# Patient Record
Sex: FEMALE | Race: WHITE | NOT HISPANIC OR LATINO | Employment: OTHER | URBAN - METROPOLITAN AREA
[De-identification: names, ages, dates, MRNs, and addresses within clinical notes are randomized per-mention and may not be internally consistent; named-entity substitution may affect disease eponyms.]

---

## 2017-02-07 ENCOUNTER — CONVERSION ENCOUNTER (OUTPATIENT)
Dept: MAMMOGRAPHY | Facility: HOSPITAL | Age: 47
End: 2017-02-07

## 2018-02-26 ENCOUNTER — OFFICE VISIT CONVERTED (OUTPATIENT)
Dept: FAMILY MEDICINE CLINIC | Facility: CLINIC | Age: 48
End: 2018-02-26
Attending: NURSE PRACTITIONER

## 2018-03-28 ENCOUNTER — CONVERSION ENCOUNTER (OUTPATIENT)
Dept: FAMILY MEDICINE CLINIC | Facility: CLINIC | Age: 48
End: 2018-03-28

## 2018-03-28 ENCOUNTER — OFFICE VISIT CONVERTED (OUTPATIENT)
Dept: FAMILY MEDICINE CLINIC | Facility: CLINIC | Age: 48
End: 2018-03-28
Attending: NURSE PRACTITIONER

## 2018-05-01 ENCOUNTER — OFFICE VISIT CONVERTED (OUTPATIENT)
Dept: ORTHOPEDIC SURGERY | Facility: CLINIC | Age: 48
End: 2018-05-01
Attending: ORTHOPAEDIC SURGERY

## 2018-05-01 ENCOUNTER — CONVERSION ENCOUNTER (OUTPATIENT)
Dept: ORTHOPEDIC SURGERY | Facility: CLINIC | Age: 48
End: 2018-05-01

## 2018-05-03 ENCOUNTER — OFFICE VISIT CONVERTED (OUTPATIENT)
Dept: FAMILY MEDICINE CLINIC | Facility: CLINIC | Age: 48
End: 2018-05-03
Attending: NURSE PRACTITIONER

## 2018-05-03 ENCOUNTER — CONVERSION ENCOUNTER (OUTPATIENT)
Dept: FAMILY MEDICINE CLINIC | Facility: CLINIC | Age: 48
End: 2018-05-03

## 2018-05-11 ENCOUNTER — OFFICE VISIT CONVERTED (OUTPATIENT)
Dept: ORTHOPEDIC SURGERY | Facility: CLINIC | Age: 48
End: 2018-05-11
Attending: ORTHOPAEDIC SURGERY

## 2018-05-24 ENCOUNTER — CONVERSION ENCOUNTER (OUTPATIENT)
Dept: MAMMOGRAPHY | Facility: HOSPITAL | Age: 48
End: 2018-05-24

## 2018-06-21 ENCOUNTER — CONVERSION ENCOUNTER (OUTPATIENT)
Dept: FAMILY MEDICINE CLINIC | Facility: CLINIC | Age: 48
End: 2018-06-21

## 2018-06-21 ENCOUNTER — OFFICE VISIT CONVERTED (OUTPATIENT)
Dept: FAMILY MEDICINE CLINIC | Facility: CLINIC | Age: 48
End: 2018-06-21
Attending: NURSE PRACTITIONER

## 2018-07-26 ENCOUNTER — OFFICE VISIT CONVERTED (OUTPATIENT)
Dept: FAMILY MEDICINE CLINIC | Facility: CLINIC | Age: 48
End: 2018-07-26
Attending: NURSE PRACTITIONER

## 2018-08-08 ENCOUNTER — OFFICE VISIT CONVERTED (OUTPATIENT)
Dept: FAMILY MEDICINE CLINIC | Facility: CLINIC | Age: 48
End: 2018-08-08
Attending: NURSE PRACTITIONER

## 2018-08-15 ENCOUNTER — CONVERSION ENCOUNTER (OUTPATIENT)
Dept: FAMILY MEDICINE CLINIC | Facility: CLINIC | Age: 48
End: 2018-08-15

## 2018-08-15 ENCOUNTER — OFFICE VISIT CONVERTED (OUTPATIENT)
Dept: FAMILY MEDICINE CLINIC | Facility: CLINIC | Age: 48
End: 2018-08-15
Attending: NURSE PRACTITIONER

## 2018-09-17 ENCOUNTER — OFFICE VISIT CONVERTED (OUTPATIENT)
Dept: FAMILY MEDICINE CLINIC | Facility: CLINIC | Age: 48
End: 2018-09-17
Attending: NURSE PRACTITIONER

## 2018-10-09 ENCOUNTER — OFFICE VISIT CONVERTED (OUTPATIENT)
Dept: ORTHOPEDIC SURGERY | Facility: CLINIC | Age: 48
End: 2018-10-09
Attending: ORTHOPAEDIC SURGERY

## 2018-10-09 ENCOUNTER — CONVERSION ENCOUNTER (OUTPATIENT)
Dept: ORTHOPEDIC SURGERY | Facility: CLINIC | Age: 48
End: 2018-10-09

## 2019-01-14 ENCOUNTER — HOSPITAL ENCOUNTER (OUTPATIENT)
Dept: URGENT CARE | Facility: CLINIC | Age: 49
Discharge: HOME | End: 2019-01-14
Attending: NURSE PRACTITIONER

## 2019-01-17 LAB — BACTERIA SPEC AEROBE CULT: NORMAL

## 2019-02-07 ENCOUNTER — HOSPITAL ENCOUNTER (OUTPATIENT)
Dept: OTHER | Facility: HOSPITAL | Age: 49
Discharge: HOME | End: 2019-02-07
Attending: INTERNAL MEDICINE

## 2019-02-07 LAB
ALT SERPL-CCNC: 10 U/L (ref 10–40)
AST SERPL-CCNC: 13 U/L (ref 15–50)
BASOPHILS # BLD AUTO: 0.06 10*3/UL (ref 0–0.2)
BASOPHILS NFR BLD AUTO: 0.8 % (ref 0–3)
BUN SERPL-MCNC: 12 MG/DL (ref 5–25)
CREAT UR-MCNC: 0.88 MG/DL (ref 0.5–0.9)
EOSINOPHIL # BLD AUTO: 0.11 10*3/UL (ref 0–0.7)
EOSINOPHIL # BLD AUTO: 1.47 % (ref 0–7)
ERYTHROCYTE [DISTWIDTH] IN BLOOD BY AUTOMATED COUNT: 11.5 % (ref 11.5–14.5)
ERYTHROCYTE [SEDIMENTATION RATE] IN BLOOD: 25 MM/H (ref 0–30)
HBA1C MFR BLD: 14.6 G/DL (ref 12–16)
HCT VFR BLD AUTO: 41 % (ref 37–47)
LYMPHOCYTES # BLD AUTO: 2.73 10*3/UL (ref 1–5)
MCH RBC QN AUTO: 31.8 PG (ref 27–31)
MCHC RBC AUTO-ENTMCNC: 35.6 G/DL (ref 33–37)
MCV RBC AUTO: 89.3 FL (ref 81–99)
MONOCYTES # BLD AUTO: 0.54 10*3/UL (ref 0.2–1.2)
MONOCYTES NFR BLD AUTO: 7.5 % (ref 3–10)
NEUTROPHILS # BLD AUTO: 3.71 10*3/UL (ref 2–8)
NEUTROPHILS NFR BLD AUTO: 51.9 % (ref 30–85)
NRBC BLD AUTO-RTO: 0 % (ref 0–0.01)
PLATELET # BLD AUTO: 252 10*3/UL (ref 130–400)
PMV BLD AUTO: 7.7 FL (ref 7.4–10.4)
RBC # BLD AUTO: 4.59 10*6/UL (ref 4.2–5.4)
VARIANT LYMPHS NFR BLD MANUAL: 38.3 % (ref 20–45)
WBC # BLD AUTO: 7.14 10*3/UL (ref 4.8–10.8)

## 2019-02-22 ENCOUNTER — HOSPITAL ENCOUNTER (OUTPATIENT)
Dept: URGENT CARE | Facility: CLINIC | Age: 49
Discharge: HOME | End: 2019-02-22
Attending: EMERGENCY MEDICINE

## 2019-05-16 ENCOUNTER — HOSPITAL ENCOUNTER (OUTPATIENT)
Dept: FAMILY MEDICINE CLINIC | Facility: CLINIC | Age: 49
Discharge: HOME | End: 2019-05-16
Attending: NURSE PRACTITIONER

## 2019-05-16 ENCOUNTER — OFFICE VISIT CONVERTED (OUTPATIENT)
Dept: FAMILY MEDICINE CLINIC | Facility: CLINIC | Age: 49
End: 2019-05-16
Attending: NURSE PRACTITIONER

## 2019-05-16 LAB
ALBUMIN SERPL-MCNC: 4.1 G/DL (ref 3.5–5)
ALBUMIN/GLOB SERPL: 1.2 {RATIO} (ref 1.4–2.6)
ALP SERPL-CCNC: 122 U/L (ref 42–98)
ALT SERPL-CCNC: 13 U/L (ref 10–40)
ANION GAP SERPL CALC-SCNC: 17 MMOL/L (ref 8–19)
AST SERPL-CCNC: 15 U/L (ref 15–50)
BASOPHILS # BLD AUTO: 0.03 10*3/UL (ref 0–0.2)
BASOPHILS NFR BLD AUTO: 0.4 % (ref 0–3)
BILIRUB SERPL-MCNC: 0.24 MG/DL (ref 0.2–1.3)
BUN SERPL-MCNC: 14 MG/DL (ref 5–25)
BUN/CREAT SERPL: 17 {RATIO} (ref 6–20)
CALCIUM SERPL-MCNC: 9.5 MG/DL (ref 8.7–10.4)
CHLORIDE SERPL-SCNC: 103 MMOL/L (ref 99–111)
CHOLEST SERPL-MCNC: 170 MG/DL (ref 107–200)
CHOLEST/HDLC SERPL: 5.2 {RATIO} (ref 3–6)
CONV ABS IMM GRAN: 0.02 10*3/UL (ref 0–0.2)
CONV CO2: 25 MMOL/L (ref 22–32)
CONV IMMATURE GRAN: 0.2 % (ref 0–1.8)
CONV TOTAL PROTEIN: 7.6 G/DL (ref 6.3–8.2)
CREAT UR-MCNC: 0.82 MG/DL (ref 0.5–0.9)
DEPRECATED RDW RBC AUTO: 43.5 FL (ref 36.4–46.3)
EOSINOPHIL # BLD AUTO: 0.16 10*3/UL (ref 0–0.7)
EOSINOPHIL # BLD AUTO: 1.9 % (ref 0–7)
ERYTHROCYTE [DISTWIDTH] IN BLOOD BY AUTOMATED COUNT: 12.8 % (ref 11.7–14.4)
GFR SERPLBLD BASED ON 1.73 SQ M-ARVRAT: >60 ML/MIN/{1.73_M2}
GLOBULIN UR ELPH-MCNC: 3.5 G/DL (ref 2–3.5)
GLUCOSE SERPL-MCNC: 84 MG/DL (ref 65–99)
HBA1C MFR BLD: 14.1 G/DL (ref 12–16)
HCT VFR BLD AUTO: 42.7 % (ref 37–47)
HDLC SERPL-MCNC: 33 MG/DL (ref 40–60)
LDLC SERPL CALC-MCNC: 80 MG/DL (ref 70–100)
LYMPHOCYTES # BLD AUTO: 3.17 10*3/UL (ref 1–5)
MCH RBC QN AUTO: 30.8 PG (ref 27–31)
MCHC RBC AUTO-ENTMCNC: 33 G/DL (ref 33–37)
MCV RBC AUTO: 93.2 FL (ref 81–99)
MONOCYTES # BLD AUTO: 0.6 10*3/UL (ref 0.2–1.2)
MONOCYTES NFR BLD AUTO: 7.1 % (ref 3–10)
NEUTROPHILS # BLD AUTO: 4.48 10*3/UL (ref 2–8)
NEUTROPHILS NFR BLD AUTO: 52.9 % (ref 30–85)
NRBC CBCN: 0 % (ref 0–0.7)
OSMOLALITY SERPL CALC.SUM OF ELEC: 292 MOSM/KG (ref 273–304)
PLATELET # BLD AUTO: 272 10*3/UL (ref 130–400)
PMV BLD AUTO: 11.6 FL (ref 9.4–12.3)
POTASSIUM SERPL-SCNC: 4 MMOL/L (ref 3.5–5.3)
RBC # BLD AUTO: 4.58 10*6/UL (ref 4.2–5.4)
SODIUM SERPL-SCNC: 141 MMOL/L (ref 135–147)
TRIGL SERPL-MCNC: 283 MG/DL (ref 40–150)
VARIANT LYMPHS NFR BLD MANUAL: 37.5 % (ref 20–45)
VLDLC SERPL-MCNC: 57 MG/DL (ref 5–37)
WBC # BLD AUTO: 8.46 10*3/UL (ref 4.8–10.8)

## 2019-05-21 ENCOUNTER — OFFICE VISIT CONVERTED (OUTPATIENT)
Dept: SURGERY | Facility: CLINIC | Age: 49
End: 2019-05-21
Attending: SURGERY

## 2019-06-10 ENCOUNTER — HOSPITAL ENCOUNTER (OUTPATIENT)
Dept: URGENT CARE | Facility: CLINIC | Age: 49
Discharge: HOME | End: 2019-06-10

## 2019-06-12 LAB — BACTERIA SPEC AEROBE CULT: NORMAL

## 2019-07-23 ENCOUNTER — HOSPITAL ENCOUNTER (OUTPATIENT)
Dept: MAMMOGRAPHY | Facility: HOSPITAL | Age: 49
Discharge: HOME | End: 2019-07-23
Attending: NURSE PRACTITIONER

## 2019-08-07 ENCOUNTER — HOSPITAL ENCOUNTER (OUTPATIENT)
Dept: GASTROENTEROLOGY | Facility: HOSPITAL | Age: 49
Setting detail: HOSPITAL OUTPATIENT SURGERY
Discharge: HOME | End: 2019-08-07
Attending: SURGERY

## 2019-08-14 ENCOUNTER — OFFICE VISIT CONVERTED (OUTPATIENT)
Dept: FAMILY MEDICINE CLINIC | Facility: CLINIC | Age: 49
End: 2019-08-14
Attending: NURSE PRACTITIONER

## 2019-09-23 ENCOUNTER — OFFICE VISIT CONVERTED (OUTPATIENT)
Dept: FAMILY MEDICINE CLINIC | Facility: CLINIC | Age: 49
End: 2019-09-23
Attending: NURSE PRACTITIONER

## 2019-09-23 ENCOUNTER — CONVERSION ENCOUNTER (OUTPATIENT)
Dept: FAMILY MEDICINE CLINIC | Facility: CLINIC | Age: 49
End: 2019-09-23

## 2019-10-09 ENCOUNTER — HOSPITAL ENCOUNTER (OUTPATIENT)
Dept: OTHER | Facility: HOSPITAL | Age: 49
Discharge: HOME | End: 2019-10-09
Attending: NURSE PRACTITIONER

## 2019-10-09 LAB
ALT SERPL-CCNC: 13 U/L (ref 10–40)
AST SERPL-CCNC: 17 U/L (ref 15–50)
BASOPHILS # BLD AUTO: 0.03 10*3/UL (ref 0–0.2)
BASOPHILS NFR BLD AUTO: 0.5 % (ref 0–3)
BUN SERPL-MCNC: 10 MG/DL (ref 5–25)
CONV ABS IMM GRAN: 0.01 10*3/UL (ref 0–0.2)
CONV IMMATURE GRAN: 0.2 % (ref 0–1.8)
CREAT UR-MCNC: 0.78 MG/DL (ref 0.5–0.9)
DEPRECATED RDW RBC AUTO: 40.9 FL (ref 36.4–46.3)
EOSINOPHIL # BLD AUTO: 0.12 10*3/UL (ref 0–0.7)
EOSINOPHIL # BLD AUTO: 1.9 % (ref 0–7)
ERYTHROCYTE [DISTWIDTH] IN BLOOD BY AUTOMATED COUNT: 12.4 % (ref 11.7–14.4)
ERYTHROCYTE [SEDIMENTATION RATE] IN BLOOD: 15 MM/H (ref 0–30)
HCT VFR BLD AUTO: 42.3 % (ref 37–47)
HGB BLD-MCNC: 14 G/DL (ref 12–16)
LYMPHOCYTES # BLD AUTO: 3.08 10*3/UL (ref 1–5)
LYMPHOCYTES NFR BLD AUTO: 50 % (ref 20–45)
MCH RBC QN AUTO: 30.3 PG (ref 27–31)
MCHC RBC AUTO-ENTMCNC: 33.1 G/DL (ref 33–37)
MCV RBC AUTO: 91.6 FL (ref 81–99)
MONOCYTES # BLD AUTO: 0.23 10*3/UL (ref 0.2–1.2)
MONOCYTES NFR BLD AUTO: 3.7 % (ref 3–10)
NEUTROPHILS # BLD AUTO: 2.69 10*3/UL (ref 2–8)
NEUTROPHILS NFR BLD AUTO: 43.7 % (ref 30–85)
NRBC CBCN: 0 % (ref 0–0.7)
PLATELET # BLD AUTO: 270 10*3/UL (ref 130–400)
PMV BLD AUTO: 10.5 FL (ref 9.4–12.3)
RBC # BLD AUTO: 4.62 10*6/UL (ref 4.2–5.4)
WBC # BLD AUTO: 6.16 10*3/UL (ref 4.8–10.8)

## 2019-12-30 ENCOUNTER — HOSPITAL ENCOUNTER (OUTPATIENT)
Dept: URGENT CARE | Facility: CLINIC | Age: 49
Discharge: HOME | End: 2019-12-30
Attending: EMERGENCY MEDICINE

## 2020-01-02 LAB — BACTERIA SPEC AEROBE CULT: NORMAL

## 2020-01-21 ENCOUNTER — HOSPITAL ENCOUNTER (OUTPATIENT)
Dept: URGENT CARE | Facility: CLINIC | Age: 50
Discharge: HOME | End: 2020-01-21

## 2020-02-05 ENCOUNTER — OFFICE VISIT CONVERTED (OUTPATIENT)
Dept: FAMILY MEDICINE CLINIC | Facility: CLINIC | Age: 50
End: 2020-02-05
Attending: NURSE PRACTITIONER

## 2020-02-05 ENCOUNTER — CONVERSION ENCOUNTER (OUTPATIENT)
Dept: FAMILY MEDICINE CLINIC | Facility: CLINIC | Age: 50
End: 2020-02-05

## 2020-02-20 ENCOUNTER — HOSPITAL ENCOUNTER (OUTPATIENT)
Dept: FAMILY MEDICINE CLINIC | Facility: CLINIC | Age: 50
Discharge: HOME | End: 2020-02-20
Attending: NURSE PRACTITIONER

## 2020-02-20 ENCOUNTER — OFFICE VISIT CONVERTED (OUTPATIENT)
Dept: FAMILY MEDICINE CLINIC | Facility: CLINIC | Age: 50
End: 2020-02-20
Attending: NURSE PRACTITIONER

## 2020-02-21 LAB
A FUMIGATUS AB SER QL ID: <0.1 K[IU]/ML
AMER SYCAMORE IGE QN: <0.1 K[IU]/ML
BERMUDA GRASS IGE QN: <0.1 K[IU]/ML (ref 0–0.35)
BOXELDER IGE QN: <0.1 K[IU]/ML
CALIF WALNUT POLN IGE QN: <0.1 K[IU]/ML (ref 0–0.35)
CAT DANDER IGG QN: <0.1 K[IU]/ML (ref 0–0.35)
CLADOSPORIUM IGE: <0.1 K[IU]/ML
CMN PIGWEED IGE QN: <0.1 K[IU]/ML
COMMON RAGWEED IGE QN: <0.1 K[IU]/ML (ref 0–0.35)
COTTONWOOD IGE QN: <0.1 K[IU]/ML
D FARINAE IGE QN: <0.1 K[IU]/ML (ref 0–0.35)
D PTERONYSS IGE QN: <0.1 K[IU]/ML (ref 0–0.35)
DOG DANDER IGE QN: <0.1 K[IU]/ML (ref 0–0.35)
GOOSEFOOT IGE QN: <0.1 K[IU]/ML (ref 0–0.35)
IGE SERPL-ACNC: 225 K[IU]/ML (ref 0–24)
IMMUNOCAP RESULT: ABNORMAL (ref 0–0)
JOHNSON GRASS IGE QN: <0.1 K[IU]/ML (ref 0–0.35)
MEADOW FESCUE IGE QN: <0.1 K[IU]/ML (ref 0–0.35)
MOLD IGE: <0.1 K[IU]/ML (ref 0–0.35)
MOUSE URINE PROT IGE QN: <0.1 K[IU]/ML
MT JUNIPER IGE QN: <0.1 K[IU]/ML
OAK DUST IGE QN: <0.1 K[IU]/ML (ref 0–0.35)
P NOTATUM IGE QN: <0.1 K[IU]/ML
PECAN/HICK TREE IGE QN: <0.1 K[IU]/ML (ref 0–0.35)
ROACH IGE QN: <0.1 K[IU]/ML (ref 0–0.35)
TIMOTHY IGE QN: <0.1 K[IU]/ML
WHITE ASH IGE QN: <0.1 K[IU]/ML
WHITE BIRCH IGE QN: <0.1 K[IU]/ML (ref 0–0.35)
WHITE ELM IGE QN: <0.1 K[IU]/ML (ref 0–0.35)
WHITE MULBERRY IGE QN: <0.1 K[IU]/ML

## 2020-03-11 ENCOUNTER — HOSPITAL ENCOUNTER (OUTPATIENT)
Dept: OTHER | Facility: HOSPITAL | Age: 50
Discharge: HOME | End: 2020-03-11
Attending: INTERNAL MEDICINE

## 2020-03-11 LAB
25(OH)D3 SERPL-MCNC: 15.1 NG/ML (ref 30–100)
ALT SERPL-CCNC: 12 U/L (ref 10–40)
AST SERPL-CCNC: 15 U/L (ref 15–50)
BASOPHILS # BLD AUTO: 0.04 10*3/UL (ref 0–0.2)
BASOPHILS NFR BLD AUTO: 0.5 % (ref 0–3)
BUN SERPL-MCNC: 9 MG/DL (ref 5–25)
CONV ABS IMM GRAN: 0.01 10*3/UL (ref 0–0.2)
CONV IMMATURE GRAN: 0.1 % (ref 0–1.8)
CREAT UR-MCNC: 0.77 MG/DL (ref 0.5–0.9)
DEPRECATED RDW RBC AUTO: 43.8 FL (ref 36.4–46.3)
EOSINOPHIL # BLD AUTO: 0.12 10*3/UL (ref 0–0.7)
EOSINOPHIL # BLD AUTO: 1.5 % (ref 0–7)
ERYTHROCYTE [DISTWIDTH] IN BLOOD BY AUTOMATED COUNT: 13 % (ref 11.7–14.4)
ERYTHROCYTE [SEDIMENTATION RATE] IN BLOOD: 25 MM/H (ref 0–30)
HCT VFR BLD AUTO: 44.9 % (ref 37–47)
HGB BLD-MCNC: 14.7 G/DL (ref 12–16)
LYMPHOCYTES # BLD AUTO: 3.49 10*3/UL (ref 1–5)
LYMPHOCYTES NFR BLD AUTO: 43.2 % (ref 20–45)
MCH RBC QN AUTO: 30.4 PG (ref 27–31)
MCHC RBC AUTO-ENTMCNC: 32.7 G/DL (ref 33–37)
MCV RBC AUTO: 93 FL (ref 81–99)
MONOCYTES # BLD AUTO: 0.73 10*3/UL (ref 0.2–1.2)
MONOCYTES NFR BLD AUTO: 9 % (ref 3–10)
NEUTROPHILS # BLD AUTO: 3.69 10*3/UL (ref 2–8)
NEUTROPHILS NFR BLD AUTO: 45.7 % (ref 30–85)
NRBC CBCN: 0 % (ref 0–0.7)
PLATELET # BLD AUTO: 294 10*3/UL (ref 130–400)
PMV BLD AUTO: 10.5 FL (ref 9.4–12.3)
RBC # BLD AUTO: 4.83 10*6/UL (ref 4.2–5.4)
WBC # BLD AUTO: 8.08 10*3/UL (ref 4.8–10.8)

## 2020-03-13 LAB — TRYPTASE SERPL-MCNC: 5.1 UG/L (ref 2.2–13.2)

## 2020-03-16 LAB — CONV ANTI GALACTOSE ALPHA 1,3 IGE: 0.16 KU/L

## 2020-05-18 ENCOUNTER — OFFICE VISIT CONVERTED (OUTPATIENT)
Dept: FAMILY MEDICINE CLINIC | Facility: CLINIC | Age: 50
End: 2020-05-18
Attending: NURSE PRACTITIONER

## 2020-07-14 ENCOUNTER — OFFICE VISIT CONVERTED (OUTPATIENT)
Dept: ORTHOPEDIC SURGERY | Facility: CLINIC | Age: 50
End: 2020-07-14
Attending: ORTHOPAEDIC SURGERY

## 2020-08-04 ENCOUNTER — OFFICE VISIT CONVERTED (OUTPATIENT)
Dept: ORTHOPEDIC SURGERY | Facility: CLINIC | Age: 50
End: 2020-08-04
Attending: PHYSICIAN ASSISTANT

## 2020-08-11 ENCOUNTER — OFFICE VISIT CONVERTED (OUTPATIENT)
Dept: ORTHOPEDIC SURGERY | Facility: CLINIC | Age: 50
End: 2020-08-11
Attending: PHYSICIAN ASSISTANT

## 2020-08-13 ENCOUNTER — HOSPITAL ENCOUNTER (OUTPATIENT)
Dept: MAMMOGRAPHY | Facility: HOSPITAL | Age: 50
Discharge: HOME | End: 2020-08-13
Attending: NURSE PRACTITIONER

## 2020-08-18 ENCOUNTER — OFFICE VISIT CONVERTED (OUTPATIENT)
Dept: ORTHOPEDIC SURGERY | Facility: CLINIC | Age: 50
End: 2020-08-18
Attending: PHYSICIAN ASSISTANT

## 2020-08-25 ENCOUNTER — OFFICE VISIT CONVERTED (OUTPATIENT)
Dept: ORTHOPEDIC SURGERY | Facility: CLINIC | Age: 50
End: 2020-08-25
Attending: PHYSICIAN ASSISTANT

## 2020-08-26 ENCOUNTER — HOSPITAL ENCOUNTER (OUTPATIENT)
Dept: LAB | Facility: HOSPITAL | Age: 50
Discharge: HOME | End: 2020-08-26
Attending: INTERNAL MEDICINE

## 2020-08-26 LAB
ALBUMIN SERPL-MCNC: 4 G/DL (ref 3.5–5)
ALT SERPL-CCNC: 8 U/L (ref 10–40)
AST SERPL-CCNC: 12 U/L (ref 15–50)
BASOPHILS # BLD AUTO: 0.06 10*3/UL (ref 0–0.2)
BASOPHILS NFR BLD AUTO: 0.7 % (ref 0–3)
BUN SERPL-MCNC: 16 MG/DL (ref 5–25)
CONV ABS IMM GRAN: 0.04 10*3/UL (ref 0–0.2)
CONV IMMATURE GRAN: 0.5 % (ref 0–1.8)
CREAT UR-MCNC: 0.84 MG/DL (ref 0.5–0.9)
DEPRECATED RDW RBC AUTO: 43.6 FL (ref 36.4–46.3)
EOSINOPHIL # BLD AUTO: 0.19 10*3/UL (ref 0–0.7)
EOSINOPHIL # BLD AUTO: 2.2 % (ref 0–7)
ERYTHROCYTE [DISTWIDTH] IN BLOOD BY AUTOMATED COUNT: 12.6 % (ref 11.7–14.4)
ERYTHROCYTE [SEDIMENTATION RATE] IN BLOOD: 21 MM/H (ref 0–30)
HCT VFR BLD AUTO: 43.4 % (ref 37–47)
HGB BLD-MCNC: 14 G/DL (ref 12–16)
LYMPHOCYTES # BLD AUTO: 3.56 10*3/UL (ref 1–5)
LYMPHOCYTES NFR BLD AUTO: 40.4 % (ref 20–45)
MCH RBC QN AUTO: 30.4 PG (ref 27–31)
MCHC RBC AUTO-ENTMCNC: 32.3 G/DL (ref 33–37)
MCV RBC AUTO: 94.1 FL (ref 81–99)
MONOCYTES # BLD AUTO: 0.81 10*3/UL (ref 0.2–1.2)
MONOCYTES NFR BLD AUTO: 9.2 % (ref 3–10)
NEUTROPHILS # BLD AUTO: 4.16 10*3/UL (ref 2–8)
NEUTROPHILS NFR BLD AUTO: 47 % (ref 30–85)
NRBC CBCN: 0 % (ref 0–0.7)
PLATELET # BLD AUTO: 306 10*3/UL (ref 130–400)
PMV BLD AUTO: 10.5 FL (ref 9.4–12.3)
RBC # BLD AUTO: 4.61 10*6/UL (ref 4.2–5.4)
WBC # BLD AUTO: 8.82 10*3/UL (ref 4.8–10.8)

## 2020-09-01 ENCOUNTER — OFFICE VISIT CONVERTED (OUTPATIENT)
Dept: ORTHOPEDIC SURGERY | Facility: CLINIC | Age: 50
End: 2020-09-01
Attending: PHYSICIAN ASSISTANT

## 2020-09-04 ENCOUNTER — HOSPITAL ENCOUNTER (OUTPATIENT)
Dept: MAMMOGRAPHY | Facility: HOSPITAL | Age: 50
Discharge: HOME | End: 2020-09-04
Attending: NURSE PRACTITIONER

## 2020-09-08 ENCOUNTER — OFFICE VISIT CONVERTED (OUTPATIENT)
Dept: ORTHOPEDIC SURGERY | Facility: CLINIC | Age: 50
End: 2020-09-08
Attending: PHYSICIAN ASSISTANT

## 2020-09-10 ENCOUNTER — HOSPITAL ENCOUNTER (OUTPATIENT)
Dept: URGENT CARE | Facility: CLINIC | Age: 50
Discharge: HOME | End: 2020-09-10
Attending: EMERGENCY MEDICINE

## 2020-09-13 LAB — BACTERIA SPEC AEROBE CULT: NORMAL

## 2020-09-15 ENCOUNTER — HOSPITAL ENCOUNTER (OUTPATIENT)
Dept: MAMMOGRAPHY | Facility: HOSPITAL | Age: 50
Discharge: HOME | End: 2020-09-15
Attending: NURSE PRACTITIONER

## 2020-09-30 ENCOUNTER — OFFICE VISIT CONVERTED (OUTPATIENT)
Dept: SURGERY | Facility: CLINIC | Age: 50
End: 2020-09-30
Attending: SURGERY

## 2020-10-01 ENCOUNTER — HOSPITAL ENCOUNTER (OUTPATIENT)
Dept: PREADMISSION TESTING | Facility: HOSPITAL | Age: 50
Discharge: HOME | End: 2020-10-01
Attending: SURGERY

## 2020-10-05 ENCOUNTER — OFFICE VISIT CONVERTED (OUTPATIENT)
Dept: FAMILY MEDICINE CLINIC | Facility: CLINIC | Age: 50
End: 2020-10-05
Attending: NURSE PRACTITIONER

## 2020-10-05 ENCOUNTER — HOSPITAL ENCOUNTER (OUTPATIENT)
Dept: PREADMISSION TESTING | Facility: HOSPITAL | Age: 50
Discharge: HOME | End: 2020-10-05
Attending: SURGERY

## 2020-10-06 LAB — SARS-COV-2 RNA SPEC QL NAA+PROBE: NOT DETECTED

## 2020-10-09 ENCOUNTER — HOSPITAL ENCOUNTER (OUTPATIENT)
Dept: PERIOP | Facility: HOSPITAL | Age: 50
Setting detail: HOSPITAL OUTPATIENT SURGERY
Discharge: HOME | End: 2020-10-09
Attending: SURGERY

## 2020-10-15 ENCOUNTER — OFFICE VISIT CONVERTED (OUTPATIENT)
Dept: SURGERY | Facility: CLINIC | Age: 50
End: 2020-10-15
Attending: SURGERY

## 2020-10-19 ENCOUNTER — HOSPITAL ENCOUNTER (OUTPATIENT)
Dept: URGENT CARE | Facility: CLINIC | Age: 50
Discharge: HOME | End: 2020-10-19
Attending: EMERGENCY MEDICINE

## 2020-10-20 ENCOUNTER — OFFICE VISIT CONVERTED (OUTPATIENT)
Dept: SURGERY | Facility: CLINIC | Age: 50
End: 2020-10-20
Attending: SURGERY

## 2020-10-22 ENCOUNTER — OFFICE VISIT CONVERTED (OUTPATIENT)
Dept: URGENT CARE | Facility: CLINIC | Age: 50
End: 2020-10-22
Attending: INTERNAL MEDICINE

## 2020-10-22 ENCOUNTER — HOSPITAL ENCOUNTER (OUTPATIENT)
Dept: ONCOLOGY | Facility: HOSPITAL | Age: 50
Discharge: HOME | End: 2020-10-22
Attending: INTERNAL MEDICINE

## 2020-11-02 ENCOUNTER — OFFICE VISIT CONVERTED (OUTPATIENT)
Dept: SURGERY | Facility: CLINIC | Age: 50
End: 2020-11-02
Attending: SURGERY

## 2020-11-21 ENCOUNTER — HOSPITAL ENCOUNTER (OUTPATIENT)
Dept: URGENT CARE | Facility: CLINIC | Age: 50
Discharge: HOME | End: 2020-11-21
Attending: FAMILY MEDICINE

## 2020-12-01 ENCOUNTER — OFFICE VISIT CONVERTED (OUTPATIENT)
Dept: ORTHOPEDIC SURGERY | Facility: CLINIC | Age: 50
End: 2020-12-01
Attending: PHYSICIAN ASSISTANT

## 2020-12-04 ENCOUNTER — OFFICE VISIT CONVERTED (OUTPATIENT)
Dept: URGENT CARE | Facility: CLINIC | Age: 50
End: 2020-12-04
Attending: INTERNAL MEDICINE

## 2020-12-28 ENCOUNTER — HOSPITAL ENCOUNTER (OUTPATIENT)
Dept: PREADMISSION TESTING | Facility: HOSPITAL | Age: 50
Discharge: HOME | End: 2020-12-28
Attending: ORTHOPAEDIC SURGERY

## 2020-12-28 LAB
ALBUMIN SERPL-MCNC: 4.2 G/DL (ref 3.5–5)
ALBUMIN/GLOB SERPL: 1.1 {RATIO} (ref 1.4–2.6)
ALP SERPL-CCNC: 104 U/L (ref 53–141)
ALT SERPL-CCNC: 10 U/L (ref 10–40)
ANION GAP SERPL CALC-SCNC: 14 MMOL/L (ref 8–19)
APTT BLD: 23.6 S (ref 22.2–34.2)
AST SERPL-CCNC: 16 U/L (ref 15–50)
BASOPHILS # BLD AUTO: 0.03 10*3/UL (ref 0–0.2)
BASOPHILS NFR BLD AUTO: 0.4 % (ref 0–3)
BILIRUB SERPL-MCNC: 0.39 MG/DL (ref 0.2–1.3)
BUN SERPL-MCNC: 14 MG/DL (ref 5–25)
BUN/CREAT SERPL: 19 {RATIO} (ref 6–20)
CALCIUM SERPL-MCNC: 9.6 MG/DL (ref 8.7–10.4)
CHLORIDE SERPL-SCNC: 105 MMOL/L (ref 99–111)
CONV ABS IMM GRAN: 0.03 10*3/UL (ref 0–0.2)
CONV CO2: 24 MMOL/L (ref 22–32)
CONV IMMATURE GRAN: 0.4 % (ref 0–1.8)
CONV TOTAL PROTEIN: 7.9 G/DL (ref 6.3–8.2)
CREAT UR-MCNC: 0.73 MG/DL (ref 0.5–0.9)
DEPRECATED RDW RBC AUTO: 41.9 FL (ref 36.4–46.3)
EOSINOPHIL # BLD AUTO: 0.14 10*3/UL (ref 0–0.7)
EOSINOPHIL # BLD AUTO: 1.7 % (ref 0–7)
ERYTHROCYTE [DISTWIDTH] IN BLOOD BY AUTOMATED COUNT: 12.9 % (ref 11.7–14.4)
EST. AVERAGE GLUCOSE BLD GHB EST-MCNC: 105 MG/DL
GFR SERPLBLD BASED ON 1.73 SQ M-ARVRAT: >60 ML/MIN/{1.73_M2}
GLOBULIN UR ELPH-MCNC: 3.7 G/DL (ref 2–3.5)
GLUCOSE SERPL-MCNC: 98 MG/DL (ref 65–99)
HBA1C MFR BLD: 5.3 % (ref 3.5–5.7)
HCT VFR BLD AUTO: 43.5 % (ref 37–47)
HGB BLD-MCNC: 14.9 G/DL (ref 12–16)
INR PPP: 0.91 (ref 2–3)
LYMPHOCYTES # BLD AUTO: 3.02 10*3/UL (ref 1–5)
LYMPHOCYTES NFR BLD AUTO: 37.1 % (ref 20–45)
MCH RBC QN AUTO: 30.8 PG (ref 27–31)
MCHC RBC AUTO-ENTMCNC: 34.3 G/DL (ref 33–37)
MCV RBC AUTO: 90.1 FL (ref 81–99)
MONOCYTES # BLD AUTO: 0.71 10*3/UL (ref 0.2–1.2)
MONOCYTES NFR BLD AUTO: 8.7 % (ref 3–10)
NEUTROPHILS # BLD AUTO: 4.21 10*3/UL (ref 2–8)
NEUTROPHILS NFR BLD AUTO: 51.7 % (ref 30–85)
NRBC CBCN: 0 % (ref 0–0.7)
OSMOLALITY SERPL CALC.SUM OF ELEC: 288 MOSM/KG (ref 273–304)
PLATELET # BLD AUTO: 293 10*3/UL (ref 130–400)
PMV BLD AUTO: 9.5 FL (ref 9.4–12.3)
POTASSIUM SERPL-SCNC: 4.3 MMOL/L (ref 3.5–5.3)
PROTHROMBIN TIME: 10 S (ref 9.4–12)
RBC # BLD AUTO: 4.83 10*6/UL (ref 4.2–5.4)
SODIUM SERPL-SCNC: 139 MMOL/L (ref 135–147)
WBC # BLD AUTO: 8.14 10*3/UL (ref 4.8–10.8)

## 2020-12-30 ENCOUNTER — HOSPITAL ENCOUNTER (OUTPATIENT)
Dept: URGENT CARE | Facility: CLINIC | Age: 50
Discharge: HOME | End: 2020-12-30
Attending: FAMILY MEDICINE

## 2021-01-06 ENCOUNTER — HOSPITAL ENCOUNTER (OUTPATIENT)
Dept: PREADMISSION TESTING | Facility: HOSPITAL | Age: 51
Discharge: HOME | End: 2021-01-06
Attending: ORTHOPAEDIC SURGERY

## 2021-01-07 LAB — SARS-COV-2 RNA SPEC QL NAA+PROBE: NOT DETECTED

## 2021-01-11 ENCOUNTER — HOSPITAL ENCOUNTER (OUTPATIENT)
Dept: PERIOP | Facility: HOSPITAL | Age: 51
Setting detail: HOSPITAL OUTPATIENT SURGERY
Discharge: HOME | End: 2021-01-12
Attending: INTERNAL MEDICINE

## 2021-01-12 LAB
ANION GAP SERPL CALC-SCNC: 11 MMOL/L (ref 8–19)
BUN SERPL-MCNC: 11 MG/DL (ref 5–25)
BUN/CREAT SERPL: 16 {RATIO} (ref 6–20)
CALCIUM SERPL-MCNC: 8.5 MG/DL (ref 8.7–10.4)
CHLORIDE SERPL-SCNC: 103 MMOL/L (ref 99–111)
CONV CO2: 24 MMOL/L (ref 22–32)
CREAT UR-MCNC: 0.7 MG/DL (ref 0.5–0.9)
GFR SERPLBLD BASED ON 1.73 SQ M-ARVRAT: >60 ML/MIN/{1.73_M2}
GLUCOSE SERPL-MCNC: 112 MG/DL (ref 65–99)
HCT VFR BLD AUTO: 34.2 % (ref 37–47)
HGB BLD-MCNC: 11.3 G/DL (ref 12–16)
OSMOLALITY SERPL CALC.SUM OF ELEC: 278 MOSM/KG (ref 273–304)
POTASSIUM SERPL-SCNC: 4.4 MMOL/L (ref 3.5–5.3)
SODIUM SERPL-SCNC: 134 MMOL/L (ref 135–147)

## 2021-01-26 ENCOUNTER — OFFICE VISIT CONVERTED (OUTPATIENT)
Dept: ORTHOPEDIC SURGERY | Facility: CLINIC | Age: 51
End: 2021-01-26
Attending: PHYSICIAN ASSISTANT

## 2021-02-18 ENCOUNTER — OFFICE VISIT CONVERTED (OUTPATIENT)
Dept: ORTHOPEDIC SURGERY | Facility: CLINIC | Age: 51
End: 2021-02-18
Attending: ORTHOPAEDIC SURGERY

## 2021-02-20 ENCOUNTER — HOSPITAL ENCOUNTER (OUTPATIENT)
Dept: URGENT CARE | Facility: CLINIC | Age: 51
Discharge: HOME | End: 2021-02-20
Attending: EMERGENCY MEDICINE

## 2021-02-26 ENCOUNTER — OFFICE VISIT CONVERTED (OUTPATIENT)
Dept: ORTHOPEDIC SURGERY | Facility: CLINIC | Age: 51
End: 2021-02-26
Attending: PHYSICIAN ASSISTANT

## 2021-03-03 ENCOUNTER — OFFICE VISIT CONVERTED (OUTPATIENT)
Dept: FAMILY MEDICINE CLINIC | Facility: CLINIC | Age: 51
End: 2021-03-03
Attending: NURSE PRACTITIONER

## 2021-03-25 ENCOUNTER — OFFICE VISIT CONVERTED (OUTPATIENT)
Dept: ORTHOPEDIC SURGERY | Facility: CLINIC | Age: 51
End: 2021-03-25

## 2021-05-10 NOTE — H&P
"   History and Physical      Patient Name: Slime Mcelroy   Patient ID: [id number]   Sex: Female   YOB: 1970    Primary Care Provider: Chacha SHAH   Referring Provider: Chacha SHAH    Visit Date: July 14, 2020    Provider: Hannah Cerda MD   Location: [facility]   Location Address: [address] [place] [address]  [id number]   Location Phone: [phone]          Chief Complaint  · Bilateral Knee Pain      History Of Present Illness  Slime Mcelroy is a 49 year old /White female who presents today to [facility].      right. Patient seen us several years ago and was found to have osteoarthritis, she was given anti-inflammatories at that time. She reports pain daily but the pain increases at night. Patient was given etodolac by her provider who also did an aspiration with some relief of pain and swelling.\">The patient presents today for evaluation of bilateral knees, left > right. Patient seen us several years ago and was found to have osteoarthritis, she was given anti-inflammatories at that time. She reports pain daily but the pain increases at night. Patient was given etodolac by her provider who also did an aspiration with some relief of pain and swelling.       Past Medical History  Allergic rhinitis; Ankle pain, left; Arthralgia; Arthritis; Arthritis, Rheumatoid; Arthropathy, unspecified; Athletes foot; Broken Bones; Corns and callus; Fatigue, unspecified type; Heel pain; Hemorrhoids, Unspecified, without Complications; Hyperlipidemia; Ingrowing toenail; Kidney calculus; Limb Swelling; Plantar wart; Primary osteoarthritis of left knee; Renal Calculus; Seasonal allergies         Past Surgical History  Colonoscopy; Dilation and curretage; elbow; Hand surgery, left; Joint Surgery; Laparoscopy; Tendon Repair; Thumb surgery; Toenail Removal         Medication List  clindamycin HCl 300 mg oral capsule; clobetasol 0.05 % topical ointment; cyclobenzaprine 10 " "mg oral tablet; EpiPen 2-Sony 0.3 mg/0.3 mL injection auto-injector; etodolac 500 mg oral tablet; famotidine 20 mg oral tablet; fluticasone propionate 50 mcg/actuation nasal spray,suspension; hydroxyzine HCl 25 mg oral tablet; Lidoderm 5 % topical adhesive patch,medicated; loratadine 10 mg oral tablet; methotrexate sodium 2.5 mg oral tablet; Singulair 10 mg oral tablet         Allergy List  amoxicillin; Benadryl; CEPHALOSPORINS; Codeine Phosphate; Codeine Sulfate; hydrocodone-acetaminophen       Allergies Reconciled  Family Medical History  Stroke; Heart Disease; Diabetes, unspecified type; Diabetes Mellitus, Type II; Family history of certain chronic disabling diseases; arthritis; Osteoporosis; Family history of Arthritis         Reproductive History   0 Para 0 0 0 0 & Postmenopausal       Social History  Alcohol (Never); Alcohol Use (Never); Denies substance abuse; lives with children; Recreational Drug Use (Never); Retired.; Sedentary; Tobacco (Current every day);          Review of Systems  · Constitutional  o Denies  o : fever, chills, weight loss  · Cardiovascular  o Denies  o : chest pain, shortness of breath  · Gastrointestinal  o Denies  o : liver disease, heartburn, nausea, blood in stools  · Genitourinary  o Denies  o : painful urination, blood in urine  · Integument  o Denies  o : rash, itching  · Neurologic  o Denies  o : headache, weakness, loss of consciousness  · Musculoskeletal  o Denies  o : painful, swollen joints  · Psychiatric  o Denies  o : drug/alcohol addiction, anxiety, depression      Vitals  Date Time BP Position Site L\R Cuff Size HR RR TEMP (F) WT  HT  BMI kg/m2 BSA m2 O2 Sat        2020 10:56 AM      76 - R   244lbs 6oz 5'  4\" 41.95 2.24 98 %          Physical Examination  · Constitutional  o Appearance  o : well developed, well-nourished, no obvious deformities present  · Head and Face  o Head  o :   § Inspection  § : normocephalic  o Face  o :   § Inspection  § : no " facial lesions  · Eyes  o Conjunctivae  o : conjunctivae normal  o Sclerae  o : sclerae white  · Ears, Nose, Mouth and Throat  o Ears  o :   § External Ears  § : appearance within normal limits  § Hearing  § : intact  o Nose  o :   § External Nose  § : appearance normal  · Neck  o Inspection/Palpation  o : normal appearance  o Range of Motion  o : full range of motion  · Respiratory  o Respiratory Effort  o : breathing unlabored  o Inspection of Chest  o : normal appearance  o Auscultation of Lungs  o : no audible wheezing or rales  · Cardiovascular  o Heart  o : regular rate  · Gastrointestinal  o Abdominal Examination  o : soft and non-tender  · Skin and Subcutaneous Tissue  o General Inspection  o : intact, no rashes  · Psychiatric  o General  o : Alert and oriented x3  o Judgement and Insight  o : judgment and insight intact  o Mood and Affect  o : mood normal, affect appropriate  · Extremities  o Extremities  o : Bilateral knees: Full extension to flexion 120 degrees, Positive crepitus, Non-tender, full weight-bearing, mild limp, skin intact, no skin discoloration, sensation intact, pulses present  · In Office Procedures  o View  o : LAT/SUNRISE/STANDING   o Site  o : bilateral, knee  o Indication  o : Bilateral knee pain  o Study  o : X-rays ordered, taken in the office, and reviewed today.  o Xray  o : Reveals moderate osteoarthritis of right knee, osteophyte formation present; left knee reveals advanced osteoarthritis, most notably patellofemoral   o Comparative Data  o : Comparative Data found and reviewed today               Assessment  · Primary osteoarthritis of right knee     715.16/M17.11  · Primary osteoarthritis of left knee     715.16/M17.12  · Pain in both knees, unspecified chronicity       Pain in right knee     719.46/M25.561  Pain in left knee     719.46/M25.562      Plan  · Orders  o Knee (Left) [facility] Preferred View (79419-TC) - 719.46/M25.562 - 07/14/2020  o Knee (Right) [facility] Preferred View  (28414-UV) - 719.46/M25.561 - 07/14/2020  o Tobacco cessation counseling completed (4004F) - - 07/14/2020  · Medications  o Medications have been Reconciled  o Transition of Care or Provider Policy  · Instructions  o Reviewed the patient's Past Medical, Social, and Family history as well as the ROS at today's visit, no changes.  o Call or return if worsening symptoms.  o X-ray ordered, taken and reviewed at this visit.  o The above service was scribed by Diana Dwyer on my behalf and I attest to the accuracy of the note. mc  o Discussed conservative treatment options with injections, medications and exercises. Discussed she will need a knee replacement in the future. She wished to proceed with viscosupplementation injection approval. Follow-up for injection, once approved.             Electronically Signed by: Diana Dwyer-, Other -Author on July 14, 2020 08:34:58 PM  Electronically Co-signed by: Hannah Cerda MD -Reviewer on July 17, 2020 09:41:47 AM

## 2021-05-10 NOTE — H&P
History and Physical      Patient Name: Slime Mcelroy   Patient ID: [id number]   Sex: Female   YOB: 1970    Primary Care Provider: Chacha SHAH   Referring Provider: Chacha SHAH    Visit Date: November 2, 2020    Provider: Salma Moore MD   [facility]   Location Address: [address] [place] [address]  [id number]   Location Phone: [phone]          Chief Complaint  · Follow Up Surgery      History Of Present Illness  Slime Mcelroy is a 49 year old /White female who is here today for follow up of: Breast Mass Removal Right Side.   She is doing well-status post surgery. She had a stitch that was bothering her and wanted it removed. This was done in clinic today.       Past Medical History  Allergic rhinitis; Ankle pain, left; Arthralgia; Arthritis; Arthritis, Rheumatoid; Arthropathy, unspecified; Athletes foot; Broken Bones; Corns and callus; Fatigue, unspecified type; Heel pain; Hemorrhoids, Unspecified, without Complications; Hyperlipidemia; Ingrowing toenail; Kidney calculus; Limb Swelling; Plantar wart; Primary osteoarthritis of left knee; Renal Calculus; Seasonal allergies         Past Surgical History  Colonoscopy; Dilation and curretage; elbow; Hand surgery, left; Joint Surgery; Laparoscopy; Tendon Repair; Thumb surgery; Toenail Removal         Medication List  clobetasol 0.05 % topical ointment; cyclobenzaprine 10 mg oral tablet; EpiPen 2-Sony 0.3 mg/0.3 mL injection auto-injector; etodolac 500 mg oral tablet; famotidine 20 mg oral tablet; fluticasone propionate 50 mcg/actuation nasal spray,suspension; hydroxyzine HCl 25 mg oral tablet; Lidoderm 5 % topical adhesive patch,medicated; loratadine 10 mg oral tablet; Medrol (Sony) 4 mg oral tablets,dose pack; methotrexate sodium 2.5 mg oral tablet; Singulair 10 mg oral tablet; Ultram 50 mg oral tablet         Allergy List  amoxicillin; Benadryl; CEPHALOSPORINS; Codeine  "Phosphate; Codeine Sulfate; hydrocodone-acetaminophen       Allergies Reconciled  Family Medical History  Stroke; Heart Disease; Diabetes, unspecified type; Diabetes Mellitus, Type II; Family history of certain chronic disabling diseases; arthritis; Osteoporosis; Family history of Arthritis         Reproductive History   0 Para 0 0 0 0 & Postmenopausal       Social History  Alcohol (Never); Alcohol Use (Never); Denies substance abuse; lives with children; Recreational Drug Use (Never); Retired.; Sedentary; Tobacco (Current every day);          Review of Systems  · Constitutional  o Denies  o : fever, chills  · Eyes  o Denies  o : yellowish discoloration of the eyes, eye pain  · HENT  o Denies  o : difficulty swallowing, hoarseness  · Breasts  o * See HPI  · Cardiovascular  o Denies  o : chest pain on exertion, irregular heart beats  · Respiratory  o Denies  o : shortness of breath, cough  · Gastrointestinal  o Denies  o : nausea, vomiting, diarrhea, constipation  · Integument  o Admits  o : see HPI for surgical incision healing  · Neurologic  o Denies  o : tingling or numbness, loss of balance  · Musculoskeletal  o Denies  o : joint pain, back pain  · Endocrine  o Denies  o : weight gain, weight loss  · All Others Negative      Vitals  Date Time BP Position Site L\R Cuff Size HR RR TEMP (F) WT  HT  BMI kg/m2 BSA m2 O2 Sat FR L/min FiO2        2020 08:36 AM       15  246lbs 0oz 5'  4\" 42.23 2.24             Physical Examination  · Constitutional  o Appearance  o : well developed/well nourished patient in no apparent distress  · Respiratory  o Inspection of Chest  o : equal breaths bilaterally  · Gastrointestinal  o Abdominal Examination  o : soft/nontender, nondistended, no organomegaly appreciated  · Post Surgical Incision  o Surgical wound  o : healing well, no erythema, stitch trimmed          Assessment  · Postoperative Exam Following " Surgery     V67.00/Z09      Plan  · Medications  o Medications have been Reconciled  o Transition of Care or Provider Policy  · Instructions  o Return to office with any issues.  o F/U PRN            Electronically Signed by: Salma Moore MD -Author on November 2, 2020 08:41:17 AM

## 2021-05-10 NOTE — H&P
History and Physical      Patient Name: Slime Mcelroy   Patient ID: [id number]   Sex: Female   YOB: 1970    Primary Care Provider: Chacha SHAH   Referring Provider: Chacha SHAH    Visit Date: October 15, 2020    Provider: Salma Moore MD   [place] [facility]   Location Address: [address] [place] [address]  [id number]   Location Phone: [phone]          Chief Complaint  · Follow Up Surgery      History Of Present Illness  Slime Mcelroy is a 49 year old /White female who is here today for follow up of: Breast Mass Removal Right Side.   She is doing well-status post surgery. Pathology origianlllly was ADH and now on excision was benign.       Past Medical History  Allergic rhinitis; Ankle pain, left; Arthralgia; Arthritis; Arthritis, Rheumatoid; Arthropathy, unspecified; Athletes foot; Broken Bones; Corns and callus; Fatigue, unspecified type; Heel pain; Hemorrhoids, Unspecified, without Complications; Hyperlipidemia; Ingrowing toenail; Kidney calculus; Limb Swelling; Plantar wart; Primary osteoarthritis of left knee; Renal Calculus; Seasonal allergies         Past Surgical History  Colonoscopy; Dilation and curretage; elbow; Hand surgery, left; Joint Surgery; Laparoscopy; Tendon Repair; Thumb surgery; Toenail Removal         Medication List  clobetasol 0.05 % topical ointment; cyclobenzaprine 10 mg oral tablet; EpiPen 2-Sony 0.3 mg/0.3 mL injection auto-injector; etodolac 500 mg oral tablet; famotidine 20 mg oral tablet; fluticasone propionate 50 mcg/actuation nasal spray,suspension; hydroxyzine HCl 25 mg oral tablet; Lidoderm 5 % topical adhesive patch,medicated; loratadine 10 mg oral tablet; Medrol (Sony) 4 mg oral tablets,dose pack; methotrexate sodium 2.5 mg oral tablet; Singulair 10 mg oral tablet; Ultram 50 mg oral tablet         Allergy List  amoxicillin; Benadryl; CEPHALOSPORINS; Codeine Phosphate; Codeine Sulfate;  "hydrocodone-acetaminophen       Allergies Reconciled  Family Medical History  Stroke; Heart Disease; Diabetes, unspecified type; Diabetes Mellitus, Type II; Family history of certain chronic disabling diseases; arthritis; Osteoporosis; Family history of Arthritis         Reproductive History   0 Para 0 0 0 0 & Postmenopausal       Social History  Alcohol (Never); Alcohol Use (Never); Denies substance abuse; lives with children; Recreational Drug Use (Never); Retired.; Sedentary; Tobacco (Current every day);          Review of Systems  · Constitutional  o Denies  o : fever, chills  · Eyes  o Denies  o : yellowish discoloration of the eyes, eye pain  · HENT  o Denies  o : difficulty swallowing, hoarseness  · Breasts  o * See HPI  · Cardiovascular  o Denies  o : chest pain on exertion, irregular heart beats  · Respiratory  o Denies  o : shortness of breath, cough  · Gastrointestinal  o Denies  o : nausea, vomiting, diarrhea, constipation  · Integument  o Admits  o : see HPI for surgical incision healing  · Neurologic  o Denies  o : tingling or numbness, loss of balance  · Musculoskeletal  o Denies  o : joint pain, back pain  · Endocrine  o Denies  o : weight gain, weight loss  · All Others Negative      Vitals  Date Time BP Position Site L\R Cuff Size HR RR TEMP (F) WT  HT  BMI kg/m2 BSA m2 O2 Sat FR L/min FiO2        10/15/2020 09:26 AM       15  246lbs 0oz 5'  4\" 42.23 2.24             Physical Examination  · Constitutional  o Appearance  o : well developed/well nourished patient in no apparent distress  · Respiratory  o Inspection of Chest  o : equal breaths bilaterally  · Gastrointestinal  o Abdominal Examination  o : soft/nontender, nondistended, no organomegaly appreciated  · Post Surgical Incision  o Surgical wound  o : healing well, no erythema          Assessment  · Postoperative Exam Following Surgery     V67.00/Z09      Plan  · Medications  o Medications have been Reconciled  o Transition of " Care or Provider Policy  · Instructions  o Return to office with any issues.  o F/U PRN  o Will refer to [facility] for chemical risk reduction            Electronically Signed by: Salma Moore MD -Author on October 15, 2020 09:30:49 AM

## 2021-05-10 NOTE — H&P
History and Physical      Patient Name: Slime Mcelroy   Patient ID: [id number]   Sex: Female   YOB: 1970    Primary Care Provider: Chacha SHAH   Referring Provider: Chacha SHAH    Visit Date: September 30, 2020    Provider: Salma Moore MD   Location: [facility]   Location Address: [address] [place] [address]  [id number]   Location Phone: [phone]          Chief Complaint  · ADH/ALH of right breast  · Pre-Surgical History and Physical Examination for [facility]  · Consents for Surgery      History Of Present Illness  Slime Mcelroy is a 49 year old /White female who is referred from Chacha SHAH ; she had abnormal calcificiations in right upper outer breast posterior 3rd depth that were biopsied and returned as:   Palpation-guided or Image Guided needle biopsy:    * Image guided needle biospy performed. Results from the needle guided biopsy are ADH and ALH.         Past Medical History  Allergic rhinitis; Ankle pain, left; Arthralgia; Arthritis; Arthritis, Rheumatoid; Arthropathy, unspecified; Athletes foot; Broken Bones; Corns and callus; Fatigue, unspecified type; Heel pain; Hemorrhoids, Unspecified, without Complications; Hyperlipidemia; Ingrowing toenail; Kidney calculus; Limb Swelling; Plantar wart; Primary osteoarthritis of left knee; Renal Calculus; Seasonal allergies         Past Surgical History  Colonoscopy; Dilation and curretage; elbow; Hand surgery, left; Joint Surgery; Laparoscopy; Tendon Repair; Thumb surgery; Toenail Removal         Medication List  clindamycin HCl 300 mg oral capsule; clobetasol 0.05 % topical ointment; cyclobenzaprine 10 mg oral tablet; EpiPen 2-Sony 0.3 mg/0.3 mL injection auto-injector; etodolac 500 mg oral tablet; famotidine 20 mg oral tablet; fluticasone propionate 50 mcg/actuation nasal spray,suspension; hydroxyzine HCl 25 mg oral tablet; Lidoderm 5 % topical adhesive  "patch,medicated; loratadine 10 mg oral tablet; methotrexate sodium 2.5 mg oral tablet; Singulair 10 mg oral tablet; Ultram 50 mg oral tablet         Allergy List  amoxicillin; Benadryl; CEPHALOSPORINS; Codeine Phosphate; Codeine Sulfate; hydrocodone-acetaminophen       Allergies Reconciled  Family Medical History  Stroke; Heart Disease; Diabetes, unspecified type; Diabetes Mellitus, Type II; Family history of certain chronic disabling diseases; arthritis; Osteoporosis; Family history of Arthritis         Reproductive History   0 Para 0 0 0 0 & Postmenopausal       Social History  Alcohol (Never); Alcohol Use (Never); Denies substance abuse; lives with children; Recreational Drug Use (Never); Retired.; Sedentary; Tobacco (Current every day);          Review of Systems  · Constitutional  o Denies  o : fever, chills  · Breasts  o * See HPI  · Cardiovascular  o Denies  o : chest pain, cardiac murmurs, irregular heart beats, dyspnea on exertion  · Integument  o Denies  o : rash, itching, new skin lesions  · Heme-Lymph  o Denies  o : easy bleeding, easy bruising, lymph node enlargement or tenderness      Vitals  Date Time BP Position Site L\R Cuff Size HR RR TEMP (F) WT  HT  BMI kg/m2 BSA m2 O2 Sat FR L/min FiO2        2020 09:44 AM       14  241lbs 16oz 5'  4\" 41.54 2.23             Physical Examination  · Constitutional  o Appearance  o : A well-nourished, well-developed patient who ambulates without difficulty, Alert and Oriented X3.  · Respiratory  o Respiratory Effort  o : breathing unlabored  o Inspection of Chest  o : breaths equal bilaterally  · Breasts  o Inspection of Breasts  o : developmental state normal for age  o Palpation of Breasts, Axillae  o : no masses or tenderness noted on palpation, well healed biopsy site              Assessment  · Atypical ductal hyperplasia of breast     610.8/N60.99  · Preoperative Examination     V72.83      Plan  · Orders  o [facility] [place] " (76172) - 610.8/N60.99 - 10/05/2020  o Needle localization, percutaneous, for wire placement, 1st lesion, mammographic guidance [facility] (55325) - 610.8/N60.99 - 10/01/2020  o Needle localization, percutaneous, for wire placement, 1st lesion, ultrasound guidance [facility] (68015) - 610.8/N60.99 - 10/01/2020  o General Surgery Order (GENOR) - 610.8/N60.99 - 10/09/2020  · Medications  o Medications have been Reconciled  o Transition of Care or Provider Policy  · Instructions  o PLAN:  o Handouts Provided-Pre-Procedure Instructions including date and time and location of procedure.  o ****Surgical Orders****  o ****Patient Status****  o RISK AND BENEFITS:  o Consent for surgery: Given these options, the patient has verbally expressed an understanding of the risks of surgery and finds these risks acceptable. We will proceed with surgery as soon as possible.  o Possible risks, complications, benefits, and alternatives to surgical or invasive procedure have been explained to patient and/or legal guardian.  o O.R. PREP: Per protocol  o IV: Per Anesthesia  o Please sign permit for: Right breast magseed localized lumpectomy  o The above History and Physical Examination has been completed within 30 days of admission.            Electronically Signed by: Salma Moore MD -Author on September 30, 2020 10:13:22 AM

## 2021-05-13 NOTE — PROGRESS NOTES
"   Progress Note      Patient Name: Slime Mcelroy   Patient ID: [id number]   Sex: Female   YOB: 1970    Primary Care Provider: Chacha SHAH   Referring Provider: Chacha SHAH    Visit Date: October 5, 2020    Provider: ALYSSA Lowe   Location: [facility]   Location Address: [address] [place] [address]  [id number]   Location Phone: [phone]          Chief Complaint  · Acute Visit for Sinus Drainage      History Of Present Illness  Slime Mcelroy is a 49 year old /White female who presents for evaluation and treatment of:      Acute visit for sinus drainage that is causing sorethroat and ear pain  Taking Allergy meds as directed.    Pt wants to be checked to make sure she is able to go ahead with surgery scheduled this Friday  She is getting COVID tested later today.       Review of Systems  · Constitutional  o Denies  o : fever, fatigue, weight loss, weight gain  · HENT  o Admits  o : ear pain, nasal discharge, postnasal drainage, sore throat  · Cardiovascular  o Denies  o : lower extremity edema, claudication, chest pressure, palpitations  · Respiratory  o Denies  o : shortness of breath, wheezing, frequent cough, hemoptysis, dyspnea on exertion  · Gastrointestinal  o Denies  o : nausea, vomiting, diarrhea, constipation, abdominal pain  · Allergic-Immunologic  o Admits  o : seasonal allergies  o Denies  o : eczema, urticaria      Vitals  Date Time BP Position Site L\R Cuff Size HR RR TEMP (F) WT  HT  BMI kg/m2 BSA m2 O2 Sat FR L/min FiO2 HC       02/20/2020 12:22 /56 Sitting    86 - R 24  245lbs 0oz 5'  4\" 42.05 2.24 97 %      05/18/2020 01:43 /37 Sitting     82 98  5'  4\"   97 %  21%    10/05/2020 11:43 /77 Sitting    73 - R 18 97.6 246lbs 0oz 5'  4\" 42.23 2.24 97 %            Physical Examination  · Constitutional  o Appearance  o : well-nourished, in no acute distress  · Eyes  o Conjunctivae  o : " conjunctivae normal  o Sclerae  o : sclerae white  o Pupils and Irises  o : pupils equal and round  o Eyelids/Ocular Adnexae  o : eyelid appearance normal, no exudates present  · Ears, Nose, Mouth and Throat  o Ears  o :   § External Ears  § : external auditory canal appearance within normal limits, no discharge present  § Otoscopic Examination  § : tympanic membrane appearance within normal limits bilaterally, cerumen not present  o Nose  o :   § External Nose  § : appearance normal  § Intranasal Exam  § : mucosa within normal limits, vestibules normal, no intranasal lesions present, septum midline, sinuses non tender to palpation  § Nasopharynx  § : no discharge present  o Oral Cavity  o :   § Oral Mucosa  § : oral mucosa normal  § Lips  § : lip appearance normal  § Teeth  § : normal dentation for age  o Throat  o :   § Oropharynx  § : mild oropharynx inflammation present, tonsils within normal limits  · Respiratory  o Respiratory Effort  o : breathing unlabored  o Inspection of Chest  o : normal appearance  o Auscultation of Lungs  o : normal breath sounds throughout inspiration and expiration  · Cardiovascular  o Heart  o :   § Auscultation of Heart  § : regular rate and rhythm, no murmurs, gallops or rubs  · Gastrointestinal  o Abdominal Examination  o : abdomen nontender to palpation, tone normal without rigidity or guarding, no masses present, bowel sounds present  · Skin and Subcutaneous Tissue  o General Inspection  o : no rashes or lesions present, no areas of discoloration  o Body Hair  o : hair normal for age, general body hair distribution normal for age  o Digits and Nails  o : no clubbing, cyanosis, deformities or edema present, normal appearing nails  · Neurologic  o Mental Status Examination  o :   § Orientation  § : grossly oriented to person, place and time  o Gait and Station  o : normal gait, able to stand without difficulty  · Psychiatric  o Judgement and Insight  o : judgment and insight  intact  o Mood and Affect  o : mood normal, affect appropriate          Assessment  · Allergic rhinitis due to allergen     477.9/J30.9  · Upper respiratory infection     465.9/J06.9  · Sinus drainage     478.19/J34.89  · Sorethroat     462/J02.9  · Ear pain     388.70/H92.09      Plan  · Orders  o ACO-39: Current medications updated and reviewed (1159F, ) - - 10/05/2020  · Medications  o Medrol (Sony) 4 mg oral tablets,dose pack   SIG: take as directed for 6 days   DISP: (1) Package with 0 refills  Prescribed on 10/05/2020     o Medications have been Reconciled  o Transition of Care or Provider Policy  · Instructions  o Patient was educated/instructed on their diagnosis, treatment and medications prior to discharge from the clinic today.  o Call the office with any concerns or questions.  · Disposition  o Call or Return if symptoms worsen or persist.            Electronically Signed by: ALYSSA Lowe -Author on October 5, 2020 01:49:36 PM

## 2021-05-13 NOTE — PROGRESS NOTES
Progress Note      Patient Name: Slime Mcelroy   Patient ID: [id number]   Sex: Female   YOB: 1970    Primary Care Provider: Chacha SHAH   Referring Provider: Chacha SHAH    Visit Date: December 1, 2020    Provider: Karissa Morrissey PA-C   Location: [facility]   Location Address: [address] [place] [address]  [id number]   Location Phone: [phone]          Chief Complaint  · Follow up bilateral knee pain      History Of Present Illness  Slime Mcelroy is a 50 year old /White female who presents today to [facility].      Patient is following up for bilateral knee pain, bilateral knee osteoarthritis. Patient states Euflexxa injection provided relief for 1 month. Patient states knee pain wakes her up at night. Patient states limiting activities of daily living due to knee pain.       Past Medical History  Allergic rhinitis; Ankle pain, left; Arthralgia; Arthritis; Arthritis, Rheumatoid; Arthropathy, unspecified; Athletes foot; Broken Bones; Corns and callus; Fatigue, unspecified type; Heel pain; Hemorrhoids, Unspecified, without Complications; Hyperlipidemia; Ingrowing toenail; Kidney calculus; Limb Swelling; Plantar wart; Primary osteoarthritis of left knee; Renal Calculus; Seasonal allergies         Past Surgical History  Colonoscopy; Dilation and curretage; elbow; Hand surgery, left; Joint Surgery; Laparoscopy; Tendon Repair; Thumb surgery; Toenail Removal         Medication List  clobetasol 0.05 % topical ointment; cyclobenzaprine 10 mg oral tablet; EpiPen 2-Sony 0.3 mg/0.3 mL injection auto-injector; etodolac 500 mg oral tablet; famotidine 20 mg oral tablet; fluticasone propionate 50 mcg/actuation nasal spray,suspension; hydroxyzine HCl 25 mg oral tablet; Lidoderm 5 % topical adhesive patch,medicated; loratadine 10 mg oral tablet; Medrol (Sony) 4 mg oral tablets,dose pack; methotrexate sodium 2.5 mg oral tablet; Singulair 10 mg oral tablet;  "Ultram 50 mg oral tablet         Allergy List  amoxicillin; Benadryl; CEPHALOSPORINS; Codeine Phosphate; Codeine Sulfate; hydrocodone-acetaminophen         Family Medical History  Stroke; Heart Disease; Diabetes, unspecified type; Diabetes Mellitus, Type II; Family history of certain chronic disabling diseases; arthritis; Osteoporosis; Family history of Arthritis         Reproductive History   0 Para 0 0 0 0 & Postmenopausal       Social History  Alcohol (Never); Alcohol Use (Never); Denies substance abuse; lives with children; Recreational Drug Use (Never); Retired.; Sedentary; Tobacco (Current every day);          Review of Systems  · Constitutional  o Denies  o : fever, chills, weight loss  · Cardiovascular  o Denies  o : chest pain, shortness of breath  · Gastrointestinal  o Denies  o : liver disease, heartburn, nausea, blood in stools  · Genitourinary  o Denies  o : painful urination, blood in urine  · Integument  o Denies  o : rash, itching  · Neurologic  o Denies  o : headache, weakness, loss of consciousness  · Musculoskeletal  o Denies  o : painful, swollen joints  · Psychiatric  o Denies  o : drug/alcohol addiction, anxiety, depression      Vitals  Date Time BP Position Site L\R Cuff Size HR RR TEMP (F) WT  HT  BMI kg/m2 BSA m2 O2 Sat FR L/min FiO2        2020 01:08 PM      79 - R   246lbs 7oz 5'  4\" 42.3 2.25 97 %            Physical Examination  · Constitutional  o Appearance  o : well developed, well-nourished, no obvious deformities present  · Head and Face  o Head  o :   § Inspection  § : normocephalic  o Face  o :   § Inspection  § : no facial lesions  · Eyes  o Conjunctivae  o : conjunctivae normal  o Sclerae  o : sclerae white  · Ears, Nose, Mouth and Throat  o Ears  o :   § External Ears  § : appearance within normal limits  § Hearing  § : intact  o Nose  o :   § External Nose  § : appearance normal  · Neck  o Inspection/Palpation  o : normal appearance  o Range of Motion  o : " full range of motion  · Respiratory  o Respiratory Effort  o : breathing unlabored  o Inspection of Chest  o : normal appearance  o Auscultation of Lungs  o : no audible wheezing or rales  · Cardiovascular  o Heart  o : regular rate  · Gastrointestinal  o Abdominal Examination  o : soft and non-tender  · Skin and Subcutaneous Tissue  o General Inspection  o : intact, no rashes  · Psychiatric  o General  o : Alert and oriented x3  o Judgement and Insight  o : judgment and insight intact  o Mood and Affect  o : mood normal, affect appropriate  · Right Knee-Street  o Inspection  o : limping gait, weight bearing, no swelling, no ecchymosis, no atrophy, neutral alignment  o Palpation  o : tenderness at medial joint line, tenderness at lateral joint line, no patellar tendon tenderness, no pain of MCL, no pain at LCL  o ROM  o : full extension, full flexion  o Strength  o : full extension, full flexion  o Special Tests  o : negative varus stress, negaitve valgus stress  o Neurovascular  o : Full sensation, Dorsal Pedal Pulse 2+, posteriror tibialis pulse 2+  · Left Knee-Street  o Inspection  o : limping gait, weight bearing, no swelling, no ecchymosis, no atrophy, neutral alignment  o Palpation  o : tenderness at medial joint line, tenderness at lateral joint line, no patellar tendon tenderness, no pain of MCL, no pain at LCL  o ROM  o : full extension, full flexion  o Strength  o : full extension, full flexion  o Special Tests  o : negative varus stress, negaitve valgus stress  o Neurovascular  o : Full sensation, Dorsal Pedal Pulse 2+, posteriror tibialis pulse 2+          Assessment  · Primary osteoarthritis of right knee     715.16/M17.11  · Primary osteoarthritis of left knee     715.16/M17.12  · Pain: Knee     719.46/M25.569      Plan  · Medications  o Medications have been Reconciled  o Transition of Care or Provider Policy  · Instructions  o Dr. Cerda saw and examined the patient and agrees with plan.   o Reviewed  the patient's Past Medical, Social, and Family history as well as the ROS at today's visit, no changes.  o Call or return if worsening symptoms.  o Discussed surgery.  o Risks/benefits discussed with patient including, but not limited to: infection, bleeding, neurovascular damage, re-rupture, aesthetic deformity, need for further surgery, and death.  o Discussed with patient the implant type being used during surgery and patient understands and desires to proceed.  o Surgery pamphlet given.  o Electronically Identified Patient Education Materials Provided Electronically            Electronically Signed by: Karissa Morrissey PA-C -Author on December 1, 2020 01:45:55 PM

## 2021-05-13 NOTE — PROGRESS NOTES
Progress Note      Patient Name: Slime Mcelroy   Patient ID: [id number]   Sex: Female   YOB: 1970    Primary Care Provider: Chacha SHAH   Referring Provider: Chacha SHAH    Visit Date: August 18, 2020    Provider: Karissa Morrissey PA-C   Location: [facility]   Location Address: [address] [place] [address]  [id number]   Location Phone: [phone]          Chief Complaint  · Follow up left knee pain      History Of Present Illness  Slime Mcelroy is a 49 year old /White female who presents today to [facility].      Patient is following up for left knee pain, left knee osteoarthritis. Patient is here to receive #3 Euflexxa injection.       Past Medical History  Allergic rhinitis; Ankle pain, left; Arthralgia; Arthritis; Arthritis, Rheumatoid; Arthropathy, unspecified; Athletes foot; Broken Bones; Corns and callus; Fatigue, unspecified type; Heel pain; Hemorrhoids, Unspecified, without Complications; Hyperlipidemia; Ingrowing toenail; Kidney calculus; Limb Swelling; Plantar wart; Primary osteoarthritis of left knee; Renal Calculus; Seasonal allergies         Past Surgical History  Colonoscopy; Dilation and curretage; elbow; Hand surgery, left; Joint Surgery; Laparoscopy; Tendon Repair; Thumb surgery; Toenail Removal         Medication List  clindamycin HCl 300 mg oral capsule; clobetasol 0.05 % topical ointment; cyclobenzaprine 10 mg oral tablet; EpiPen 2-Sony 0.3 mg/0.3 mL injection auto-injector; etodolac 500 mg oral tablet; famotidine 20 mg oral tablet; fluticasone propionate 50 mcg/actuation nasal spray,suspension; hydroxyzine HCl 25 mg oral tablet; Lidoderm 5 % topical adhesive patch,medicated; loratadine 10 mg oral tablet; methotrexate sodium 2.5 mg oral tablet; Singulair 10 mg oral tablet; Ultram 50 mg oral tablet         Allergy List  amoxicillin; Benadryl; CEPHALOSPORINS; Codeine Phosphate; Codeine Sulfate; hydrocodone-acetaminophen  "        Family Medical History  Stroke; Heart Disease; Diabetes, unspecified type; Diabetes Mellitus, Type II; Family history of certain chronic disabling diseases; arthritis; Osteoporosis; Family history of Arthritis         Reproductive History   0 Para 0 0 0 0 & Postmenopausal       Social History  Alcohol (Never); Alcohol Use (Never); Denies substance abuse; lives with children; Recreational Drug Use (Never); Retired.; Sedentary; Tobacco (Current every day);          Review of Systems  · Constitutional  o Denies  o : fever, chills, weight loss  · Cardiovascular  o Denies  o : chest pain, shortness of breath  · Gastrointestinal  o Denies  o : liver disease, heartburn, nausea, blood in stools  · Genitourinary  o Denies  o : painful urination, blood in urine  · Integument  o Denies  o : rash, itching  · Neurologic  o Denies  o : headache, weakness, loss of consciousness  · Musculoskeletal  o Denies  o : painful, swollen joints  · Psychiatric  o Denies  o : drug/alcohol addiction, anxiety, depression      Vitals  Date Time BP Position Site L\R Cuff Size HR RR TEMP (F) WT  HT  BMI kg/m2 BSA m2 O2 Sat        2020 03:23 PM      80 - R   241lbs 16oz 5'  4\" 41.54 2.23 96 %          Physical Examination  · Constitutional  o Appearance  o : well developed, well-nourished, no obvious deformities present  · Head and Face  o Head  o :   § Inspection  § : normocephalic  o Face  o :   § Inspection  § : no facial lesions  · Eyes  o Conjunctivae  o : conjunctivae normal  o Sclerae  o : sclerae white  · Ears, Nose, Mouth and Throat  o Ears  o :   § External Ears  § : appearance within normal limits  § Hearing  § : intact  o Nose  o :   § External Nose  § : appearance normal  · Neck  o Inspection/Palpation  o : normal appearance  o Range of Motion  o : full range of motion  · Respiratory  o Respiratory Effort  o : breathing unlabored  o Inspection of Chest  o : normal appearance  o Auscultation of Lungs  o : no " audible wheezing or rales  · Cardiovascular  o Heart  o : regular rate  · Gastrointestinal  o Abdominal Examination  o : soft and non-tender  · Skin and Subcutaneous Tissue  o General Inspection  o : intact, no rashes  · Psychiatric  o General  o : Alert and oriented x3  o Judgement and Insight  o : judgment and insight intact  o Mood and Affect  o : mood normal, affect appropriate  · Injection Note/Aspiration Note  o Site  o : left knee   o Procedure  o : Procedure: After educating the patient, patient gave consent for procedure. After using Chloraprep, the joint space was injected. The patient tolerated the procedure well.   o Medication  o : Euflexxa, 20 mg   · Left Knee-Street  o Inspection  o : no limping gait, weight bearing, no swelling, no ecchymosis, no atrophy, neutral alignment  o Palpation  o : tenderness at medial joint line, tenderness at lateral joint line, no patellar tendon tenderness, no pain of MCL, no pain at LCL  o ROM  o : full extension, full flexion  o Strength  o : full extension, full flexion  o Special Tests  o : negative varus stress, negaitve valgus stress  o Neurovascular  o : Full sensation, Dorsal Pedal Pulse 2+, posteriror tibialis pulse 2+          Assessment  · Primary osteoarthritis of left knee     715.16/M17.12  · Pain: Knee     719.46/M25.569      Plan  · Orders  o Euflexxa per dose () - 715.16/M17.12 - 08/18/2020   Lot [id number] manufactured by [employer] 07 2021  o Knee Intra-articular Injection without US Guidance [facility] [id number]) - 715.16/M17.12 - 08/18/2020   Administered by Karissa REEVES  · Medications  o Medications have been Reconciled  o Transition of Care or Provider Policy  · Instructions  o Reviewed the patient's Past Medical, Social, and Family history as well as the ROS at today's visit, no changes.  o Call or return if worsening symptoms.  o Follow Up PRN.  o Electronically Identified Patient Education Materials Provided Electronically            Electronically  Signed by: Karissa Morrissey PA-C -Author on August 18, 2020 03:39:20 PM

## 2021-05-13 NOTE — PROGRESS NOTES
Progress Note      Patient Name: Slime Mcelroy   Patient ID: [id number]   Sex: Female   YOB: 1970    Primary Care Provider: Chacha SHAH   Referring Provider: Chacha SHAH    Visit Date: September 1, 2020    Provider: Karissa Morrissey PA-C   Location: [facility]   Location Address: [address] [place] [address]  [id number]   Location Phone: [phone]          Chief Complaint  · Follow up right knee pain      History Of Present Illness  Slime Mcelroy is a 49 year old /White female who presents today to [facility].      Patient is following up for right knee pain, right knee osteoarthritis. Patient is here to receive #2 Euflexxa injection.       Past Medical History  Allergic rhinitis; Ankle pain, left; Arthralgia; Arthritis; Arthritis, Rheumatoid; Arthropathy, unspecified; Athletes foot; Broken Bones; Corns and callus; Fatigue, unspecified type; Heel pain; Hemorrhoids, Unspecified, without Complications; Hyperlipidemia; Ingrowing toenail; Kidney calculus; Limb Swelling; Plantar wart; Primary osteoarthritis of left knee; Renal Calculus; Seasonal allergies         Past Surgical History  Colonoscopy; Dilation and curretage; elbow; Hand surgery, left; Joint Surgery; Laparoscopy; Tendon Repair; Thumb surgery; Toenail Removal         Medication List  clindamycin HCl 300 mg oral capsule; clobetasol 0.05 % topical ointment; cyclobenzaprine 10 mg oral tablet; EpiPen 2-Sony 0.3 mg/0.3 mL injection auto-injector; etodolac 500 mg oral tablet; famotidine 20 mg oral tablet; fluticasone propionate 50 mcg/actuation nasal spray,suspension; hydroxyzine HCl 25 mg oral tablet; Lidoderm 5 % topical adhesive patch,medicated; loratadine 10 mg oral tablet; methotrexate sodium 2.5 mg oral tablet; Singulair 10 mg oral tablet; Ultram 50 mg oral tablet         Allergy List  amoxicillin; Benadryl; CEPHALOSPORINS; Codeine Phosphate; Codeine Sulfate; hydrocodone-acetaminophen  "        Family Medical History  Stroke; Heart Disease; Diabetes, unspecified type; Diabetes Mellitus, Type II; Family history of certain chronic disabling diseases; arthritis; Osteoporosis; Family history of Arthritis         Reproductive History   0 Para 0 0 0 0 & Postmenopausal       Social History  Alcohol (Never); Alcohol Use (Never); Denies substance abuse; lives with children; Recreational Drug Use (Never); Retired.; Sedentary; Tobacco (Current every day);          Review of Systems  · Constitutional  o Denies  o : fever, chills, weight loss  · Cardiovascular  o Denies  o : chest pain, shortness of breath  · Gastrointestinal  o Denies  o : liver disease, heartburn, nausea, blood in stools  · Genitourinary  o Denies  o : painful urination, blood in urine  · Integument  o Denies  o : rash, itching  · Neurologic  o Denies  o : headache, weakness, loss of consciousness  · Musculoskeletal  o Denies  o : painful, swollen joints  · Psychiatric  o Denies  o : drug/alcohol addiction, anxiety, depression      Vitals  Date Time BP Position Site L\R Cuff Size HR RR TEMP (F) WT  HT  BMI kg/m2 BSA m2 O2 Sat        2020 02:41 PM      101 - R   241lbs 16oz 5'  4\" 41.54 2.23 97 %          Physical Examination  · Constitutional  o Appearance  o : well developed, well-nourished, no obvious deformities present  · Head and Face  o Head  o :   § Inspection  § : normocephalic  o Face  o :   § Inspection  § : no facial lesions  · Eyes  o Conjunctivae  o : conjunctivae normal  o Sclerae  o : sclerae white  · Ears, Nose, Mouth and Throat  o Ears  o :   § External Ears  § : appearance within normal limits  § Hearing  § : intact  o Nose  o :   § External Nose  § : appearance normal  · Neck  o Inspection/Palpation  o : normal appearance  o Range of Motion  o : full range of motion  · Respiratory  o Respiratory Effort  o : breathing unlabored  o Inspection of Chest  o : normal appearance  o Auscultation of Lungs  o : no " audible wheezing or rales  · Cardiovascular  o Heart  o : regular rate  · Gastrointestinal  o Abdominal Examination  o : soft and non-tender  · Skin and Subcutaneous Tissue  o General Inspection  o : intact, no rashes  · Psychiatric  o General  o : Alert and oriented x3  o Judgement and Insight  o : judgment and insight intact  o Mood and Affect  o : mood normal, affect appropriate  · Injection Note/Aspiration Note  o Site  o : right knee  o Procedure  o : Procedure: After educating the patient, patient gave consent for procedure. After using Chloraprep, the joint space was injected. The patient tolerated the procedure well.   o Medication  o : Euflexxa, 20 mg   · Right Knee-Street  o Inspection  o : no limping gait, weight bearing, no swelling, no ecchymosis, no atrophy, neutral alignment  o Palpation  o : tenderness at medial joint line, no lateral joint line tenderness, no patellar tendon tenderness, no pain of MCL, no pain at LCL  o ROM  o : full extension, full flexion  o Strength  o : full extension, full flexion  o Special Tests  o : negative varus stress, negaitve valgus stress  o Neurovascular  o : Full sensation, Dorsal Pedal Pulse 2+, posteriror tibialis pulse 2+          Assessment  · Primary osteoarthritis of right knee     715.16/M17.11  · Pain: Knee     719.46/M25.569      Plan  · Orders  o Euflexxa per dose () - 715.16/M17.11 - 09/01/2020   Lot [id number] manufactured by [employer] 05 2021  o Knee Intra-articular Injection without US Guidance [facility] 535[address]) - 715.16/M17.11 - 09/01/2020   Administered by Karissa REEVES  o Knee Intra-articular Injection without US Guidance [facility] ([address]) - 719.46/M25.569 - 09/01/2020  · Medications  o Medications have been Reconciled  o Transition of Care or Provider Policy  · Instructions  o Reviewed the patient's Past Medical, Social, and Family history as well as the ROS at today's visit, no changes.  o Call or return if worsening symptoms.  o Follow up in 1  week.  o Electronically Identified Patient Education Materials Provided Electronically            Electronically Signed by: FELTON SappC -Author on September 1, 2020 04:26:12 PM

## 2021-05-13 NOTE — PROGRESS NOTES
Progress Note      Patient Name: Slime Mcelroy   Patient ID: [id number]   Sex: Female   YOB: 1970    Primary Care Provider: Chacha SHAH   Referring Provider: Chacha SHAH    Visit Date: August 4, 2020    Provider: Karissa Morrissey PA-C   Location: [facility]   Location Address: [address] [place] [address]  [id number]   Location Phone: [phone]          Chief Complaint  · Follow up left knee pain      History Of Present Illness  Slime Mcelroy is a 49 year old /White female who presents today to [facility].      Patient is here for left knee pain, left knee osteoarthritis. Patient is here to receive #1 Euflexxa injection.       Past Medical History  Allergic rhinitis; Ankle pain, left; Arthralgia; Arthritis; Arthritis, Rheumatoid; Arthropathy, unspecified; Athletes foot; Broken Bones; Corns and callus; Fatigue, unspecified type; Heel pain; Hemorrhoids, Unspecified, without Complications; Hyperlipidemia; Ingrowing toenail; Kidney calculus; Limb Swelling; Plantar wart; Primary osteoarthritis of left knee; Renal Calculus; Seasonal allergies         Past Surgical History  Colonoscopy; Dilation and curretage; elbow; Hand surgery, left; Joint Surgery; Laparoscopy; Tendon Repair; Thumb surgery; Toenail Removal         Medication List  clindamycin HCl 300 mg oral capsule; clobetasol 0.05 % topical ointment; cyclobenzaprine 10 mg oral tablet; EpiPen 2-Sony 0.3 mg/0.3 mL injection auto-injector; etodolac 500 mg oral tablet; famotidine 20 mg oral tablet; fluticasone propionate 50 mcg/actuation nasal spray,suspension; hydroxyzine HCl 25 mg oral tablet; Lidoderm 5 % topical adhesive patch,medicated; loratadine 10 mg oral tablet; methotrexate sodium 2.5 mg oral tablet; Singulair 10 mg oral tablet         Allergy List  amoxicillin; Benadryl; CEPHALOSPORINS; Codeine Phosphate; Codeine Sulfate; hydrocodone-acetaminophen         Family Medical History  Stroke;  "Heart Disease; Diabetes, unspecified type; Diabetes Mellitus, Type II; Family history of certain chronic disabling diseases; arthritis; Osteoporosis; Family history of Arthritis         Reproductive History   0 Para 0 0 0 0 & Postmenopausal       Social History  Alcohol (Never); Alcohol Use (Never); Denies substance abuse; lives with children; Recreational Drug Use (Never); Retired.; Sedentary; Tobacco (Current every day);          Review of Systems  · Constitutional  o Denies  o : fever, chills, weight loss  · Cardiovascular  o Denies  o : chest pain, shortness of breath  · Gastrointestinal  o Denies  o : liver disease, heartburn, nausea, blood in stools  · Genitourinary  o Denies  o : painful urination, blood in urine  · Integument  o Denies  o : rash, itching  · Neurologic  o Denies  o : headache, weakness, loss of consciousness  · Musculoskeletal  o Denies  o : painful, swollen joints  · Psychiatric  o Denies  o : drug/alcohol addiction, anxiety, depression      Vitals  Date Time BP Position Site L\R Cuff Size HR RR TEMP (F) WT  HT  BMI kg/m2 BSA m2 O2 Sat        2020 03:30 PM      84 - R   243lbs 16oz 5'  4\" 41.88 2.24 97 %          Physical Examination  · Constitutional  o Appearance  o : well developed, well-nourished, no obvious deformities present  · Head and Face  o Head  o :   § Inspection  § : normocephalic  o Face  o :   § Inspection  § : no facial lesions  · Eyes  o Conjunctivae  o : conjunctivae normal  o Sclerae  o : sclerae white  · Ears, Nose, Mouth and Throat  o Ears  o :   § External Ears  § : appearance within normal limits  § Hearing  § : intact  o Nose  o :   § External Nose  § : appearance normal  · Neck  o Inspection/Palpation  o : normal appearance  o Range of Motion  o : full range of motion  · Respiratory  o Respiratory Effort  o : breathing unlabored  o Inspection of Chest  o : normal appearance  o Auscultation of Lungs  o : no audible wheezing or " rales  · Cardiovascular  o Heart  o : regular rate  · Gastrointestinal  o Abdominal Examination  o : soft and non-tender  · Skin and Subcutaneous Tissue  o General Inspection  o : intact, no rashes  · Psychiatric  o General  o : Alert and oriented x3  o Judgement and Insight  o : judgment and insight intact  o Mood and Affect  o : mood normal, affect appropriate  · Injection Note/Aspiration Note  o Site  o : left knee   o Procedure  o : Procedure: After educating the patient, patient gave consent for procedure. After using Chloraprep, the joint space was injected. The patient tolerated the procedure well.   o Medication  o : Euflexxa, 20 mg   · Left Knee-Street  o Inspection  o : no limping gait, weight bearing, no swelling, no ecchymosis, no atrophy, neutral alignment  o Palpation  o : tenderness at medial joint line, tenderness at lateral joint line, no patellar tendon tenderness, no pain of MCL, no pain at LCL  o ROM  o : full extension, full flexion  o Strength  o : full extension, full flexion  o Special Tests  o : negative varus stress, negaitve valgus stress  o Neurovascular  o : Full sensation, Dorsal Pedal Pulse 2+, posteriror tibialis pulse 2+          Assessment  · Primary osteoarthritis of left knee     715.16/M17.12  · Pain: Knee     719.46/M25.569      Plan  · Orders  o Euflexxa per dose () - 715.16/M17.12 - 08/04/2020   [id number]  o Knee Intra-articular Injection without US Guidance [facility] [id number]) - 715.16/M17.12 - 08/04/2020   Administered by Karissa jiménez PA  o Knee Intra-articular Injection without US Guidance [facility] ([id number]) - 719.46/M25.569 - 08/04/2020  · Medications  o Medications have been Reconciled  o Transition of Care or Provider Policy  · Instructions  o Reviewed the patient's Past Medical, Social, and Family history as well as the ROS at today's visit, no changes.  o Call or return if worsening symptoms.  o Follow up in 1 week.  o Electronically Identified  Patient Education Materials Provided Electronically            Electronically Signed by: Karissa Morrissey PA-C -Author on August 4, 2020 03:55:23 PM

## 2021-05-13 NOTE — PROGRESS NOTES
Progress Note      Patient Name: Slime Mcelroy   Patient ID: [id number]   Sex: Female   YOB: 1970    Primary Care Provider: Chacha SHAH   Referring Provider: Chacha SHAH    Visit Date: August 25, 2020    Provider: Karissa Morrissey PA-C   Location: [facility]   Location Address: [address] [place] [address]  [id number]   Location Phone: [phone]          Chief Complaint  · Right Knee Osteoarthritis      History Of Present Illness  Slime Mcelroy is a 49 year old /White female who presents today to [facility].      Patient is following up for right knee pain, right knee osteoarthritis. Patient is here to receive #1 Euflexxa injection.       Past Medical History  Allergic rhinitis; Ankle pain, left; Arthralgia; Arthritis; Arthritis, Rheumatoid; Arthropathy, unspecified; Athletes foot; Broken Bones; Corns and callus; Fatigue, unspecified type; Heel pain; Hemorrhoids, Unspecified, without Complications; Hyperlipidemia; Ingrowing toenail; Kidney calculus; Limb Swelling; Plantar wart; Primary osteoarthritis of left knee; Renal Calculus; Seasonal allergies         Past Surgical History  Colonoscopy; Dilation and curretage; elbow; Hand surgery, left; Joint Surgery; Laparoscopy; Tendon Repair; Thumb surgery; Toenail Removal         Medication List  clindamycin HCl 300 mg oral capsule; clobetasol 0.05 % topical ointment; cyclobenzaprine 10 mg oral tablet; EpiPen 2-Sony 0.3 mg/0.3 mL injection auto-injector; etodolac 500 mg oral tablet; famotidine 20 mg oral tablet; fluticasone propionate 50 mcg/actuation nasal spray,suspension; hydroxyzine HCl 25 mg oral tablet; Lidoderm 5 % topical adhesive patch,medicated; loratadine 10 mg oral tablet; methotrexate sodium 2.5 mg oral tablet; Singulair 10 mg oral tablet; Ultram 50 mg oral tablet         Allergy List  amoxicillin; Benadryl; CEPHALOSPORINS; Codeine Phosphate; Codeine Sulfate; hydrocodone-acetaminophen  "        Family Medical History  Stroke; Heart Disease; Diabetes, unspecified type; Diabetes Mellitus, Type II; Family history of certain chronic disabling diseases; arthritis; Osteoporosis; Family history of Arthritis         Reproductive History   0 Para 0 0 0 0 & Postmenopausal       Social History  Alcohol (Never); Alcohol Use (Never); Denies substance abuse; lives with children; Recreational Drug Use (Never); Retired.; Sedentary; Tobacco (Current every day);          Review of Systems  · Constitutional  o Denies  o : fever, chills, weight loss  · Cardiovascular  o Denies  o : chest pain, shortness of breath  · Gastrointestinal  o Denies  o : liver disease, heartburn, nausea, blood in stools  · Genitourinary  o Denies  o : painful urination, blood in urine  · Integument  o Denies  o : rash, itching  · Neurologic  o Denies  o : headache, weakness, loss of consciousness  · Musculoskeletal  o Denies  o : painful, swollen joints  · Psychiatric  o Denies  o : drug/alcohol addiction, anxiety, depression      Vitals  Date Time BP Position Site L\R Cuff Size HR RR TEMP (F) WT  HT  BMI kg/m2 BSA m2 O2 Sat        2020 02:42 PM         241lbs 16oz 5'  4\" 41.54 2.23           Physical Examination  · Constitutional  o Appearance  o : well developed, well-nourished, no obvious deformities present  · Head and Face  o Head  o :   § Inspection  § : normocephalic  o Face  o :   § Inspection  § : no facial lesions  · Eyes  o Conjunctivae  o : conjunctivae normal  o Sclerae  o : sclerae white  · Ears, Nose, Mouth and Throat  o Ears  o :   § External Ears  § : appearance within normal limits  § Hearing  § : intact  o Nose  o :   § External Nose  § : appearance normal  · Neck  o Inspection/Palpation  o : normal appearance  o Range of Motion  o : full range of motion  · Respiratory  o Respiratory Effort  o : breathing unlabored  o Inspection of Chest  o : normal appearance  o Auscultation of Lungs  o : no audible " wheezing or rales  · Cardiovascular  o Heart  o : regular rate  · Gastrointestinal  o Abdominal Examination  o : soft and non-tender  · Skin and Subcutaneous Tissue  o General Inspection  o : intact, no rashes  · Psychiatric  o General  o : Alert and oriented x3  o Judgement and Insight  o : judgment and insight intact  o Mood and Affect  o : mood normal, affect appropriate  · Injection Note/Aspiration Note  o Site  o : right knee  o Procedure  o : Procedure: After educating the patient, patient gave consent for procedure. After using Chloraprep, the joint space was injected. The patient tolerated the procedure well.   o Medication  o : Euflexxa, 20 mg   · Right Knee-Street  o Inspection  o : no limping gait, weight bearing, no swelling, no ecchymosis, no atrophy, neutral alignment  o Palpation  o : tenderness at medial joint line, tenderness at lateral joint line, no patellar tendon tenderness, no pain of MCL, no pain at LCL  o ROM  o : full extension, full flexion  o Strength  o : full extension, full flexion  o Special Tests  o : negative varus stress, negaitve valgus stress  o Neurovascular  o : Full sensation, Dorsal Pedal Pulse 2+, posteriror tibialis pulse 2+          Assessment  · Primary osteoarthritis of right knee     715.16/M17.11      Plan  · Orders  o Euflexxa per dose () - 715.16/M17.11 - 08/25/2020   Lot [id number] Exp 05 16 2021 [place] Administered by J [address] PA C  o Knee Intra-articular Injection without US Guidance [facility] ([id number]) - 715.16/M17.11 - 08/25/2020  · Medications  o Medications have been Reconciled  o Transition of Care or Provider Policy  · Instructions  o Reviewed the patient's Past Medical, Social, and Family history as well as the ROS at today's visit, no changes.  o Call or return if worsening symptoms.  o Follow up in 1 week.  o Electronically Identified Patient Education Materials Provided Electronically            Electronically Signed by: FELTON SappC -Author on August  25, 2020 03:08:50 PM

## 2021-05-13 NOTE — PROGRESS NOTES
Progress Note      Patient Name: Slime Mcelroy   Patient ID: [id number]   Sex: Female   YOB: 1970    Primary Care Provider: Chacha SHAH   Referring Provider: Chacha SHAH    Visit Date: October 20, 2020    Provider: Maxwell Calderon MD   Location: [facility]   Location Address: [address] [place] [address]  [id number]   Location Phone: [phone]          Chief Complaint  · Follow Up Office Visit      History Of Present Illness     Ms. Mcelroy is seen for concerns of a suture in her right breast. There does not appear to be a suture present.       Past Medical History  Allergic rhinitis; Ankle pain, left; Arthralgia; Arthritis; Arthritis, Rheumatoid; Arthropathy, unspecified; Athletes foot; Broken Bones; Corns and callus; Fatigue, unspecified type; Heel pain; Hemorrhoids, Unspecified, without Complications; Hyperlipidemia; Ingrowing toenail; Kidney calculus; Limb Swelling; Plantar wart; Primary osteoarthritis of left knee; Renal Calculus; Seasonal allergies         Past Surgical History  Colonoscopy; Dilation and curretage; elbow; Hand surgery, left; Joint Surgery; Laparoscopy; Tendon Repair; Thumb surgery; Toenail Removal         Medication List  clobetasol 0.05 % topical ointment; cyclobenzaprine 10 mg oral tablet; EpiPen 2-Sony 0.3 mg/0.3 mL injection auto-injector; etodolac 500 mg oral tablet; famotidine 20 mg oral tablet; fluticasone propionate 50 mcg/actuation nasal spray,suspension; hydroxyzine HCl 25 mg oral tablet; Lidoderm 5 % topical adhesive patch,medicated; loratadine 10 mg oral tablet; Medrol (Sony) 4 mg oral tablets,dose pack; methotrexate sodium 2.5 mg oral tablet; Singulair 10 mg oral tablet; Ultram 50 mg oral tablet         Allergy List  amoxicillin; Benadryl; CEPHALOSPORINS; Codeine Phosphate; Codeine Sulfate; hydrocodone-acetaminophen         Family Medical History  Stroke; Heart Disease; Diabetes, unspecified type; Diabetes Mellitus, Type II;  "Family history of certain chronic disabling diseases; arthritis; Osteoporosis; Family history of Arthritis         Reproductive History   0 Para 0 0 0 0 & Postmenopausal       Social History  Alcohol (Never); Alcohol Use (Never); Denies substance abuse; lives with children; Recreational Drug Use (Never); Retired.; Sedentary; Tobacco (Current every day);          Review of Systems  · Cardiovascular  o Denies  o : chest pain on exertion, shortness of breath, lower extremity swelling  · Respiratory  o Denies  o : wheezing, chronic cough, coughing up blood  · Gastrointestinal  o Denies  o : diarrhea, chronic abdominal pain, reflux symptoms      Vitals  Date Time BP Position Site L\R Cuff Size HR RR TEMP (F) WT  HT  BMI kg/m2 BSA m2 O2 Sat FR L/min FiO2 HC       10/20/2020 11:31 AM       14  246lbs 0oz 5'  4\" 42.23 2.24             Physical Examination     There does not appear to be a suture present. Her wound is healing. There is no cellulitis.           Assessment  · Encounter for examination following surgery     V67.00/Z09      Plan  · Medications  o Medications have been Reconciled  o Transition of Care or Provider Policy     She was told to apply moisturizer. Follow-up on a PRN basis.             Electronically Signed by: Halima Allison-, -Author on 2020 03:28:37 PM  Electronically Co-signed by: Maxwell Calderon MD -Reviewer on 2020 09:14:22 AM  "

## 2021-05-13 NOTE — PROGRESS NOTES
Progress Note      Patient Name: Slime Mcelroy   Patient ID: [id number]   Sex: Female   YOB: 1970    Primary Care Provider: Chacha SHAH   Referring Provider: Chacha SHAH    Visit Date: August 11, 2020    Provider: Karissa Morrissey PA-C   Location: [facility]   Location Address: [address] [place] [address]  [id number]   Location Phone: [phone]          Chief Complaint  · Left knee pain      History Of Present Illness  Slime Mcelroy is a 49 year old /White female who presents today to [facility].      Patient is here for left knee pain, left knee osteoarthritis. Patient is here to receive #2 Euflexxa injection. Patient states moderate amount of pain.             Past Medical History  Allergic rhinitis; Ankle pain, left; Arthralgia; Arthritis; Arthritis, Rheumatoid; Arthropathy, unspecified; Athletes foot; Broken Bones; Corns and callus; Fatigue, unspecified type; Heel pain; Hemorrhoids, Unspecified, without Complications; Hyperlipidemia; Ingrowing toenail; Kidney calculus; Limb Swelling; Plantar wart; Primary osteoarthritis of left knee; Renal Calculus; Seasonal allergies         Past Surgical History  Colonoscopy; Dilation and curretage; elbow; Hand surgery, left; Joint Surgery; Laparoscopy; Tendon Repair; Thumb surgery; Toenail Removal         Medication List  clindamycin HCl 300 mg oral capsule; clobetasol 0.05 % topical ointment; cyclobenzaprine 10 mg oral tablet; EpiPen 2-Sony 0.3 mg/0.3 mL injection auto-injector; etodolac 500 mg oral tablet; famotidine 20 mg oral tablet; fluticasone propionate 50 mcg/actuation nasal spray,suspension; hydroxyzine HCl 25 mg oral tablet; Lidoderm 5 % topical adhesive patch,medicated; loratadine 10 mg oral tablet; methotrexate sodium 2.5 mg oral tablet; Singulair 10 mg oral tablet         Allergy List  amoxicillin; Benadryl; CEPHALOSPORINS; Codeine Phosphate; Codeine Sulfate; hydrocodone-acetaminophen  "        Family Medical History  Stroke; Heart Disease; Diabetes, unspecified type; Diabetes Mellitus, Type II; Family history of certain chronic disabling diseases; arthritis; Osteoporosis; Family history of Arthritis         Reproductive History   0 Para 0 0 0 0 & Postmenopausal       Social History  Alcohol (Never); Alcohol Use (Never); Denies substance abuse; lives with children; Recreational Drug Use (Never); Retired.; Sedentary; Tobacco (Current every day);          Review of Systems  · Constitutional  o Denies  o : fever, chills, weight loss  · Cardiovascular  o Denies  o : chest pain, shortness of breath  · Gastrointestinal  o Denies  o : liver disease, heartburn, nausea, blood in stools  · Genitourinary  o Denies  o : painful urination, blood in urine  · Integument  o Denies  o : rash, itching  · Neurologic  o Denies  o : headache, weakness, loss of consciousness  · Musculoskeletal  o Denies  o : painful, swollen joints  · Psychiatric  o Denies  o : drug/alcohol addiction, anxiety, depression      Vitals  Date Time BP Position Site L\R Cuff Size HR RR TEMP (F) WT  HT  BMI kg/m2 BSA m2 O2 Sat        2020 02:47 PM         241lbs 16oz 5'  4\" 41.54 2.23           Physical Examination  · Constitutional  o Appearance  o : well developed, well-nourished, no obvious deformities present  · Head and Face  o Head  o :   § Inspection  § : normocephalic  o Face  o :   § Inspection  § : no facial lesions  · Eyes  o Conjunctivae  o : conjunctivae normal  o Sclerae  o : sclerae white  · Ears, Nose, Mouth and Throat  o Ears  o :   § External Ears  § : appearance within normal limits  § Hearing  § : intact  o Nose  o :   § External Nose  § : appearance normal  · Neck  o Inspection/Palpation  o : normal appearance  o Range of Motion  o : full range of motion  · Respiratory  o Respiratory Effort  o : breathing unlabored  o Inspection of Chest  o : normal appearance  o Auscultation of Lungs  o : no audible " wheezing or rales  · Cardiovascular  o Heart  o : regular rate  · Gastrointestinal  o Abdominal Examination  o : soft and non-tender  · Skin and Subcutaneous Tissue  o General Inspection  o : intact, no rashes  · Psychiatric  o General  o : Alert and oriented x3  o Judgement and Insight  o : judgment and insight intact  o Mood and Affect  o : mood normal, affect appropriate  · Injection Note/Aspiration Note  o Site  o : left knee   o Procedure  o : Procedure: After educating the patient, patient gave consent for procedure. After using Chloraprep, the joint space was injected. The patient tolerated the procedure well.   o Medication  o : Euflexxa, 20 mg   · Left Knee-Street  o Inspection  o : no limping gait, weight bearing, no swelling, no ecchymosis, no atrophy, neutral alignment  o Palpation  o : tenderness at medial joint line, tenderness at lateral joint line, no patellar tendon tenderness, no pain of MCL, no pain at LCL  o ROM  o : full extension, full flexion  o Strength  o : full extension, full flexion  o Special Tests  o : negative varus stress, negaitve valgus stress  o Neurovascular  o : Full sensation, Dorsal Pedal Pulse 2+, posteriror tibialis pulse 2+          Assessment  · Primary osteoarthritis of left knee     715.16/M17.12      Plan  · Orders  o Euflexxa per dose () - 715.16/M17.12 - 08/11/2020   Lot [id number] exp 03 07 2021 [place] Administered by J [address] PA C  o Knee Intra-articular Injection without US Guidance [facility] ([id number]) - 715.16/M17.12 - 08/11/2020  · Medications  o Medications have been Reconciled  o Transition of Care or Provider Policy  · Instructions  o Reviewed the patient's Past Medical, Social, and Family history as well as the ROS at today's visit, no changes.  o Call or return if worsening symptoms.  o Follow up in 1 week.  o Electronically Identified Patient Education Materials Provided Electronically     Prescribed Tramadol 50mg one po q QHS #20             Electronically  Signed by: Karissa Morrissey PA-C -Author on August 11, 2020 02:59:04 PM

## 2021-05-13 NOTE — PROGRESS NOTES
Progress Note      Patient Name: Slime Mcelroy   Patient ID: [id number]   Sex: Female   YOB: 1970    Primary Care Provider: Chacha SHAH   Referring Provider: Chacha SHAH    Visit Date: May 18, 2020    Provider: ALYSSA Lowe   Location: [facility]   Location Address: [address] [place] [address]  [id number]   Location Phone: [phone]          Chief Complaint  · Acute visit for sore throat, ear ache      History Of Present Illness  Slime Mcelroy is a 49 year old /White female who presents for evaluation and treatment of:      Acute visit for sore throat, ear ache    Onset last night.    Pt took Nyquil.    pt c/o neck sore and swollen.    denies any dental pain.       Past Medical History  Disease Name Date Onset Notes   Allergic rhinitis 05/14/2015 --    Ankle pain, left --  --    Arthralgia --  --    Arthritis --  --    Arthritis, Rheumatoid --  --    Arthropathy, unspecified --  --    Athletes foot --  --    Broken Bones --  --    Corns and callus --  --    Fatigue, unspecified type --  --    Heel pain 2000 --    Hemorrhoids, Unspecified, without Complications --  --    Hyperlipidemia --  --    Ingrowing toenail 2014 --    Kidney calculus --  --    Limb Swelling --  --    Plantar wart 1984 --    Primary osteoarthritis of left knee 05/01/2018 --    Renal Calculus --  --    Seasonal allergies --  --          Past Surgical History  Procedure Name Date Notes   Colonoscopy 08/07/2019 Mild diverticulosis of the left side of the colon, Polyp (6mm to 8mm) in the proximal rectum (Polypectomy).   Dilation and curretage --  --    elbow --  --    Hand surgery, left --  --    Joint Surgery --  --    Laparoscopy --  --    Tendon Repair --  --    Thumb surgery --  --    Toenail Removal 2014 --          Medication List  Name Date Started Instructions   clobetasol 0.05 % topical ointment 02/20/2020 apply to affected area by external route 2 times a  day as needed   cyclobenzaprine 10 mg oral tablet  take 1 tablet (10 mg) by oral route PRN   EpiPen 2-Sony 0.3 mg/0.3 mL injection auto-injector 2020 inject 0.3 milliliter (0.3 mg) by intramuscular route once as needed for anaphylaxis for 30 days   fluticasone propionate 50 mcg/actuation nasal spray,suspension 2020 PLACE TWO SPRAYS IN EACH NOSTRIL ONCE DAILY AS NEEDED   hydroxyzine HCl 25 mg oral tablet 2020 take 1 tablet (25 mg) by oral route 4 times per day as needed   Lidoderm 5 % topical adhesive patch,medicated 2019 apply 2 patches by transdermal route once daily (May wear up to 12hours.) for 30 days   loratadine 10 mg oral tablet 2020 TAKE ONE TABLET BY MOUTH DAILY for 30 days   methotrexate sodium 2.5 mg oral tablet  take 7 tablets by oral route   Singulair 10 mg oral tablet 2020 take 1 tablet (10 mg) by oral route once daily in the evening for 30 days         Allergy List  Allergen Name Date Reaction Notes   amoxicillin --  --  --    Benadryl --  --  --    CEPHALOSPORINS --  --  --    Codeine Phosphate --  --  --    Codeine Sulfate --  --  --    hydrocodone-acetaminophen --  --  --          Family Medical History  Disease Name Relative/Age Notes   Stroke  grandparent   Heart Disease  grandparent   Diabetes, unspecified type Brother/  Sister/   Sister; Brother   Diabetes mellitus, type II  grandparent   Family history of certain chronic disabling diseases; arthritis Brother/  Father/  Mother/  Sister/  Son/   Mother; Father; Sister; Brother; Son  Mother  Mother; Sister; Brother; Son   Osteoporosis Mother/   Mother         Reproductive History  Menstrual   Certainty of LMP Date: 2007 Menopause Status: Postmenopausal   Pregnancy Summary   Total Pregnancies: 0 Full Term: 0 Premature: 0   Ab Induced: 0 Ab Spontaneous: 0 Ectopics: 0   Multiples: 0 Livin         Social History  Finding Status Start/Stop Quantity Notes   Alcohol Never --/-- --  --    Alcohol Use Never --/--  "--  does not drink   Denies substance abuse --  --/-- --  --    lives with children --  --/-- --  --    Recreational Drug Use Never --/-- --  no   Retired. --  --/-- --  --    Sedentary --  --/-- --  --    Tobacco Current every day --/-- 1 PPD 1 packs per day  current every day smoker, 1 packs per day    --  --/-- --  --          Review of Systems  · Constitutional  o Denies  o : fatigue, fever, weight gain, weight loss, chills  · HENT  o Admits  o : ear pain, sore throat  · Cardiovascular  o Denies  o : chest Pain, palpitations, edema (swelling)  · Respiratory  o Denies  o : frequent cough, shortness of breath  · Gastrointestinal  o Denies  o : nausea, vomiting, changes in bowel habits  · Neurologic  o Denies  o : headache, tingling or numbness, dizziness  · Allergic-Immunologic  o Admits  o : seasonal allergies  o Denies  o : eczema, urticaria      Vitals  Date Time BP Position Site L\R Cuff Size HR RR TEMP (F) WT  HT  BMI kg/m2 BSA m2 O2 Sat        05/18/2020 01:43 /37 Sitting     82 98  5'  4\"   97 %          Physical Examination  · Constitutional  o Appearance  o : NAD, alert and oriented, pleasant  · Head and Face  o Face  o :   § Palpation  § : No sinus tenderness on palpation  · Eyes  o Conjunctivae  o : Non-injected, without edema  o Eyelids/Ocular Adnexae  o : No periorbital swelling, no allergic shiners  · Ears, Nose, Mouth and Throat  o Ears  o :   § External Ears  § : auricle appearance normal bilaterally, no auricle tenderness to palpation present  § Otoscopic Examination  § : fluid present behind usha TM  o Nose  o :   § Intranasal Exam  § : Normal pink nasal mucosa, no mucoid nasal discharge, no polyps or septal deviation  o Oral Cavity  o :   § Oral Mucosa  § : Moist mucus membranes  § Tongue  § : Coated  o Throat  o :   § Oropharynx  § : moderate oropharynx inflammation present-generalized   · Respiratory  o Respiratory Effort  o : breathing unlabored, no accessory muscle " use  o Auscultation of Lungs  o : Clear to auscultation, no wheezes/rhonchi/rales/rubs  · Cardiovascular  o Heart  o :   § Auscultation of Heart  § : Regular rate and rhythm, no murmurs  · Gastrointestinal  o Abdominal Examination  o : Soft, non-tender, normoactive bowel sounds, no masses, no guarding or rigidity  · Lymphatic  o Neck  o : bilateral tender mobile firm lymphadenopathy present, medium   · Skin and Subcutaneous Tissue  o General Inspection  o : Color normal, no rash, good turgor  · Neurologic  o Mental Status Examination  o :   § Orientation  § : grossly oriented to person, place and time          Assessment  · Pharyngitis, acute     462/J02.9    Problems Reconciled  Plan  · Orders  o ACO-17: Screened for tobacco use AND received tobacco cessation intervention (4004F) - - 05/18/2020  o ACO-39: Current medications updated and reviewed () - - 05/18/2020  o ACO - Pt declines to or was not able to provide an Advance Care Plan or name a Surrogate Decision Maker (1124F) - - 05/18/2020  · Medications  o clindamycin HCl 300 mg oral capsule   SIG: take 1 capsule (300 mg) by oral route 2 times per day for 10 days   DISP: (20) capsules with 0 refills  Prescribed on 05/18/2020     o loratadine 10 mg oral tablet   SIG: TAKE ONE TABLET BY MOUTH DAILY for 30 days   DISP: (90) Tablet with 1 refills  Adjusted on 05/18/2020     o fluticasone 50 mcg/actuation nasal spray,suspension   SIG: PLACE TWO SPRAYS IN EACH NOSTRIL ONCE DAILY AS NEEDED   DISP: (1) 16 gm aer w/adap with 5 refills  Refilled on 05/18/2020     o Medrol (Sony) 4 mg oral tablets,dose pack   SIG: take as directed for 6 days   DISP: (1) 21 ct dose-pack with 0 refills  Discontinued on 05/18/2020     o Medications have been Reconciled  o Transition of Care or Provider Policy  · Instructions  o Rest. Increase Fluids.  o Patient was educated/instructed on their diagnosis, treatment and medications prior to discharge from the clinic today.  o Call the office  with any concerns or questions.  · Disposition  o Call or Return if symptoms worsen or persist.            Electronically Signed by: ALYSSA Lowe -Author on May 18, 2020 02:50:15 PM

## 2021-05-13 NOTE — PROGRESS NOTES
Progress Note      Patient Name: Slime Mcelroy   Patient ID: [id number]   Sex: Female   YOB: 1970    Primary Care Provider: Chacha SHAH   Referring Provider: Chacha SHAH    Visit Date: September 8, 2020    Provider: Shell Hyde PA-C   Location: [facility]   Location Address: [address] [place] [address]  [id number]   Location Phone: [phone]          Chief Complaint  · Follow up right knee pain      History Of Present Illness  Slime Mcelroy is a 49 year old /White female who presents today to [facility].      She is here for right knee #3/3 Euflexxa injection.       Past Medical History  Allergic rhinitis; Ankle pain, left; Arthralgia; Arthritis; Arthritis, Rheumatoid; Arthropathy, unspecified; Athletes foot; Broken Bones; Corns and callus; Fatigue, unspecified type; Heel pain; Hemorrhoids, Unspecified, without Complications; Hyperlipidemia; Ingrowing toenail; Kidney calculus; Limb Swelling; Plantar wart; Primary osteoarthritis of left knee; Renal Calculus; Seasonal allergies         Past Surgical History  Colonoscopy; Dilation and curretage; elbow; Hand surgery, left; Joint Surgery; Laparoscopy; Tendon Repair; Thumb surgery; Toenail Removal         Medication List  clindamycin HCl 300 mg oral capsule; clobetasol 0.05 % topical ointment; cyclobenzaprine 10 mg oral tablet; EpiPen 2-Sony 0.3 mg/0.3 mL injection auto-injector; etodolac 500 mg oral tablet; famotidine 20 mg oral tablet; fluticasone propionate 50 mcg/actuation nasal spray,suspension; hydroxyzine HCl 25 mg oral tablet; Lidoderm 5 % topical adhesive patch,medicated; loratadine 10 mg oral tablet; methotrexate sodium 2.5 mg oral tablet; Singulair 10 mg oral tablet; Ultram 50 mg oral tablet         Allergy List  amoxicillin; Benadryl; CEPHALOSPORINS; Codeine Phosphate; Codeine Sulfate; hydrocodone-acetaminophen       Allergies Reconciled  Family Medical History  Stroke; Heart  "Disease; Diabetes, unspecified type; Diabetes Mellitus, Type II; Family history of certain chronic disabling diseases; arthritis; Osteoporosis; Family history of Arthritis         Reproductive History   0 Para 0 0 0 0 & Postmenopausal       Social History  Alcohol (Never); Alcohol Use (Never); Denies substance abuse; lives with children; Recreational Drug Use (Never); Retired.; Sedentary; Tobacco (Current every day);          Review of Systems  · Constitutional  o Denies  o : fever, chills, weight loss  · Cardiovascular  o Denies  o : chest pain, shortness of breath  · Gastrointestinal  o Denies  o : liver disease, heartburn, nausea, blood in stools  · Genitourinary  o Denies  o : painful urination, blood in urine  · Integument  o Denies  o : rash, itching  · Neurologic  o Denies  o : headache, weakness, loss of consciousness  · Musculoskeletal  o Admits  o : painful, swollen joints  · Psychiatric  o Denies  o : drug/alcohol addiction, anxiety, depression      Vitals  Date Time BP Position Site L\R Cuff Size HR RR TEMP (F) WT  HT  BMI kg/m2 BSA m2 O2 Sat        2020 02:40 PM      99 - R   248lbs 0oz 5'  4\" 42.57 2.25 94 %          Physical Examination  · Constitutional  o Appearance  o : well developed, well-nourished, no obvious deformities present  · Head and Face  o Head  o :   § Inspection  § : normocephalic  o Face  o :   § Inspection  § : no facial lesions  · Eyes  o Conjunctivae  o : conjunctivae normal  o Sclerae  o : sclerae white  · Ears, Nose, Mouth and Throat  o Ears  o :   § External Ears  § : appearance within normal limits  § Hearing  § : intact  o Nose  o :   § External Nose  § : appearance normal  · Neck  o Inspection/Palpation  o : normal appearance  o Range of Motion  o : full range of motion  · Respiratory  o Respiratory Effort  o : breathing unlabored  o Inspection of Chest  o : normal appearance  o Auscultation of Lungs  o : no audible wheezing or " rales  · Cardiovascular  o Heart  o : regular rate  · Gastrointestinal  o Abdominal Examination  o : soft and non-tender  · Skin and Subcutaneous Tissue  o General Inspection  o : intact, no rashes  · Psychiatric  o General  o : Alert and oriented x3  o Judgement and Insight  o : judgment and insight intact  o Mood and Affect  o : mood normal, affect appropriate  · Right Knee  o Inspection  o : No effusion or discoloration. Mild lateral joint line tenderness. Full ROM. Full weight bearing. Neurovascularly intact.   · Injection Note/Aspiration Note  o Site  o : right knee  o Procedure  o : Procedure: After educating the patient, patient gave consent for procedure. After using Chloraprep, the joint space was injected. The patient tolerated the procedure well.   o Medication  o : Euflexxa, 20 mg           Assessment  · Primary osteoarthritis of right knee     715.16/M17.11      Plan  · Orders  o Euflexxa per dose () - 715.16/M17.11 - 09/08/2020   Lot [id number] exp 05 16 2021 Maritza REEVES  o Knee Intra-articular Injection without US Guidance [facility] ([id number]) - 715.16/M17.11 - 09/08/2020   Shell REEVES  · Medications  o Medications have been Reconciled  o Transition of Care or Provider Policy  · Instructions  o Reviewed the patient's Past Medical, Social, and Family history as well as the ROS at today's visit, no changes.  o Call or return if worsening symptoms.  o Follow up PRN.   o Electronically Identified Patient Education Materials Provided Electronically            Electronically Signed by: FELTON PosadasC -Author on September 8, 2020 04:16:55 PM

## 2021-05-14 VITALS — BODY MASS INDEX: 42.34 KG/M2 | OXYGEN SATURATION: 94 % | WEIGHT: 248 LBS | HEART RATE: 99 BPM | HEIGHT: 64 IN

## 2021-05-14 VITALS — BODY MASS INDEX: 42.07 KG/M2 | WEIGHT: 246.44 LBS | OXYGEN SATURATION: 97 % | HEART RATE: 79 BPM | HEIGHT: 64 IN

## 2021-05-14 VITALS — RESPIRATION RATE: 14 BRPM | WEIGHT: 246 LBS | HEIGHT: 64 IN | BODY MASS INDEX: 42 KG/M2

## 2021-05-14 VITALS — HEIGHT: 64 IN | HEART RATE: 73 BPM | BODY MASS INDEX: 41.83 KG/M2 | OXYGEN SATURATION: 97 % | WEIGHT: 245 LBS

## 2021-05-14 VITALS
OXYGEN SATURATION: 97 % | HEART RATE: 78 BPM | WEIGHT: 236 LBS | BODY MASS INDEX: 40.29 KG/M2 | DIASTOLIC BLOOD PRESSURE: 53 MMHG | RESPIRATION RATE: 18 BRPM | TEMPERATURE: 97.6 F | HEIGHT: 64 IN | SYSTOLIC BLOOD PRESSURE: 113 MMHG

## 2021-05-14 VITALS — WEIGHT: 246 LBS | RESPIRATION RATE: 15 BRPM | BODY MASS INDEX: 42 KG/M2 | HEIGHT: 64 IN

## 2021-05-14 VITALS — HEIGHT: 64 IN | BODY MASS INDEX: 40.29 KG/M2 | OXYGEN SATURATION: 99 % | WEIGHT: 236 LBS | HEART RATE: 85 BPM

## 2021-05-14 VITALS — RESPIRATION RATE: 15 BRPM | WEIGHT: 246 LBS | HEIGHT: 64 IN | BODY MASS INDEX: 42 KG/M2

## 2021-05-14 VITALS — RESPIRATION RATE: 14 BRPM | WEIGHT: 242 LBS | HEIGHT: 64 IN | BODY MASS INDEX: 41.32 KG/M2

## 2021-05-14 VITALS — BODY MASS INDEX: 41.32 KG/M2 | WEIGHT: 242 LBS | HEIGHT: 64 IN

## 2021-05-14 VITALS — WEIGHT: 233 LBS | HEIGHT: 64 IN | BODY MASS INDEX: 39.78 KG/M2 | HEART RATE: 86 BPM | OXYGEN SATURATION: 94 %

## 2021-05-14 VITALS
DIASTOLIC BLOOD PRESSURE: 77 MMHG | OXYGEN SATURATION: 97 % | BODY MASS INDEX: 42 KG/M2 | SYSTOLIC BLOOD PRESSURE: 108 MMHG | HEART RATE: 73 BPM | WEIGHT: 246 LBS | HEIGHT: 64 IN | TEMPERATURE: 97.6 F | RESPIRATION RATE: 18 BRPM

## 2021-05-14 VITALS — BODY MASS INDEX: 41.83 KG/M2 | OXYGEN SATURATION: 98 % | HEART RATE: 91 BPM | WEIGHT: 245 LBS | HEIGHT: 64 IN

## 2021-05-14 VITALS — HEIGHT: 64 IN | BODY MASS INDEX: 41.32 KG/M2 | OXYGEN SATURATION: 97 % | HEART RATE: 101 BPM | WEIGHT: 242 LBS

## 2021-05-14 NOTE — PROGRESS NOTES
Progress Note      Patient Name: Slime Mcelroy   Patient ID: [id number]   Sex: Female   YOB: 1970    Primary Care Provider: Chacha SHAH   Referring Provider: Chacha SHAH    Visit Date: January 26, 2021    Provider: Karissa Morrissey PA-C   Location: [facility]   Location Address: [address] [place] [address]  [id number]   Location Phone: [phone]          Chief Complaint  · Follow up left knee replacement      History Of Present Illness  Slime Mcelroy is a 50 year old /White female who presents today to [facility].      Patient is status post left total knee arthroplasty performed 01/11/21 by Dr. Cerda. Patient states mild amount of pain in left knee. Patient denies calf pain. Patient is attending physical therapy at [facility]. Patient is using a cane.       Past Medical History  Allergic rhinitis; Ankle pain, left; Arthralgia; Arthritis; Arthritis, Rheumatoid; Arthropathy, unspecified; Athletes foot; Broken Bones; Corns and callus; Fatigue, unspecified type; Heel pain; Hemorrhoids, Unspecified, without Complications; Hyperlipidemia; Ingrowing toenail; Kidney calculus; Limb Swelling; Plantar wart; Primary osteoarthritis of left knee; Renal Calculus; Seasonal allergies         Past Surgical History  Colonoscopy; Dilation and curretage; elbow; Hand surgery, left; Joint Surgery; Laparoscopy; Tendon Repair; Thumb surgery; Toenail Removal         Medication List  Bactrim -160 mg oral tablet; clobetasol 0.05 % topical ointment; cyclobenzaprine 10 mg oral tablet; EpiPen 2-Sony 0.3 mg/0.3 mL injection auto-injector; etodolac 500 mg oral tablet; famotidine 20 mg oral tablet; fluticasone propionate 50 mcg/actuation nasal spray,suspension; hydroxyzine HCl 25 mg oral tablet; Lidoderm 5 % topical adhesive patch,medicated; loratadine 10 mg oral tablet; Medrol (Sony) 4 mg oral tablets,dose pack; methotrexate sodium 2.5 mg oral tablet; Singulair 10 mg oral  "tablet; Ultram 50 mg oral tablet         Allergy List  amoxicillin; Benadryl; CEPHALOSPORINS; Codeine Phosphate; Codeine Sulfate; hydrocodone-acetaminophen         Family Medical History  Stroke; Heart Disease; Diabetes, unspecified type; Diabetes Mellitus, Type II; Family history of certain chronic disabling diseases; arthritis; Osteoporosis; Family history of Arthritis         Reproductive History   0 Para 0 0 0 0 & Postmenopausal       Social History  Alcohol (Never); Alcohol Use (Never); Denies substance abuse; lives with children; Recreational Drug Use (Never); Retired.; Sedentary; Tobacco (Current every day);          Review of Systems  · Constitutional  o Denies  o : fever, chills, weight loss  · Cardiovascular  o Denies  o : chest pain, shortness of breath  · Gastrointestinal  o Denies  o : liver disease, heartburn, nausea, blood in stools  · Genitourinary  o Denies  o : painful urination, blood in urine  · Integument  o Denies  o : rash, itching  · Neurologic  o Denies  o : headache, weakness, loss of consciousness  · Musculoskeletal  o Denies  o : painful, swollen joints  · Psychiatric  o Denies  o : drug/alcohol addiction, anxiety, depression      Vitals  Date Time BP Position Site L\R Cuff Size HR RR TEMP (F) WT  HT  BMI kg/m2 BSA m2 O2 Sat FR L/min FiO2 HC       2021 02:28 PM      91 - R   245lbs 0oz 5'  4\" 42.05 2.24 98 %            Physical Examination  · Constitutional  o Appearance  o : well developed, well-nourished, no obvious deformities present  · Head and Face  o Head  o :   § Inspection  § : normocephalic  o Face  o :   § Inspection  § : no facial lesions  · Eyes  o Conjunctivae  o : conjunctivae normal  o Sclerae  o : sclerae white  · Ears, Nose, Mouth and Throat  o Ears  o :   § External Ears  § : appearance within normal limits  § Hearing  § : intact  o Nose  o :   § External Nose  § : appearance normal  · Neck  o Inspection/Palpation  o : normal appearance  o Range of " Motion  o : full range of motion  · Respiratory  o Respiratory Effort  o : breathing unlabored  o Inspection of Chest  o : normal appearance  o Auscultation of Lungs  o : no audible wheezing or rales  · Cardiovascular  o Heart  o : regular rate  · Gastrointestinal  o Abdominal Examination  o : soft and non-tender  · Skin and Subcutaneous Tissue  o General Inspection  o : intact, no rashes  · Psychiatric  o General  o : Alert and oriented x3  o Judgement and Insight  o : judgment and insight intact  o Mood and Affect  o : mood normal, affect appropriate  · In Office Procedures  o View  o : AP/LATERAL/SUNRISE   o Site  o : left, knee  o Indication  o : Left knee pain   o Study  o : X-rays ordered, taken in the office, and reviewed today.  o Xray  o : stable implant  o Comparative Data  o : Comparative Data found and reviewed today   · Left Knee-Street  o Inspection  o : incision well healed without signs of infection, limping gait, weight bearing, swelling, no ecchymosis, no atrophy, neutral alignment  o Palpation  o : no medial joint line tenderness, no lateral joint line tenderness, no patellar tendon tenderness, no pain of MCL, no pain at LCL  o ROM  o : full extension, 95 flexion  o Strength  o : weak extension, weak flexion   o Special Tests  o : negative Calista's sign  o Neurovascular  o : Full sensation, Dorsal Pedal Pulse 2+, posteriror tibialis pulse 2+          Assessment  · Aftercare;following joint replacement     V54.81/Z47.1  · Pain: Knee     719.46/M25.569      Plan  · Orders  o Knee (Left) [facility]erred View ([id number]) - 719.46/M25.569 - 01/26/2021  · Medications  o Medications have been Reconciled  o Transition of Care or Provider Policy  · Instructions  o Staples removed in clinic today.  o Reviewed the patient's Past Medical, Social, and Family history as well as the ROS at today's visit, no changes.  o Call or return if worsening symptoms.  o Follow Up in 4 weeks.   o Electronically Identified  Patient Education Materials Provided Electronically     Prescribed Percocet 7.5/325mg one po q 6 hours PRN #25  Continue physical therapy             Electronically Signed by: Karissa Morrissey PA-C -Author on January 26, 2021 02:55:03 PM

## 2021-05-14 NOTE — PROGRESS NOTES
Progress Note      Patient Name: Slime Mcelroy   Patient ID: [id number]   Sex: Female   YOB: 1970    Primary Care Provider: Chacha SHAH   Referring Provider: Chacha SHAH    Visit Date: February 26, 2021    Provider: Karissa Morrissey PA-C   Location: [facility]   Location Address: [address] [place] [address]  [id number]   Location Phone: [phone]          Chief Complaint  · Follow up left knee replacement      History Of Present Illness  Slime Mcelroy is a 50 year old /White female who presents today to [facility].      Patient is status post left total knee arthroplasty performed on 1/11/2021 by Dr. Cerda. Patient is doing very well with minimal pain in left knee. Patient is attending physical therapy at [facility].            Past Medical History  Allergic rhinitis; Ankle pain, left; Arthralgia; Arthritis; Arthritis, Rheumatoid; Arthropathy, unspecified; Athletes foot; Broken Bones; Corns and callus; Fatigue, unspecified type; Heel pain; Hemorrhoids, Unspecified, without Complications; Hyperlipidemia; Ingrowing toenail; Kidney calculus; Limb Swelling; Plantar wart; Primary osteoarthritis of left knee; Renal Calculus; Seasonal allergies         Past Surgical History  Colonoscopy; Dilation and curretage; elbow; Hand surgery, left; Joint Surgery; Laparoscopy; Tendon Repair; Thumb surgery; Toenail Removal         Medication List  Bactrim -160 mg oral tablet; clobetasol 0.05 % topical ointment; cyclobenzaprine 10 mg oral tablet; EpiPen 2-Sony 0.3 mg/0.3 mL injection auto-injector; etodolac 500 mg oral tablet; famotidine 20 mg oral tablet; fluticasone propionate 50 mcg/actuation nasal spray,suspension; hydroxyzine HCl 25 mg oral tablet; Lidoderm 5 % topical adhesive patch,medicated; loratadine 10 mg oral tablet; Medrol (Sony) 4 mg oral tablets,dose pack; methotrexate sodium 2.5 mg oral tablet; Percocet 7.5-325 mg oral tablet; Singulair 10 mg oral  "tablet; Ultram 50 mg oral tablet         Allergy List  amoxicillin; Benadryl; CEPHALOSPORINS; Codeine Phosphate; Codeine Sulfate; hydrocodone-acetaminophen         Family Medical History  Stroke; Heart Disease; Diabetes, unspecified type; Diabetes Mellitus, Type II; Family history of certain chronic disabling diseases; arthritis; Osteoporosis; Family history of Arthritis         Reproductive History   0 Para 0 0 0 0 & Postmenopausal       Social History  Alcohol (Never); Alcohol Use (Never); Denies substance abuse; lives with children; Recreational Drug Use (Never); Retired.; Sedentary; Tobacco (Current every day);          Review of Systems  · Constitutional  o Denies  o : fever, chills, weight loss  · Cardiovascular  o Denies  o : chest pain, shortness of breath  · Gastrointestinal  o Denies  o : liver disease, heartburn, nausea, blood in stools  · Genitourinary  o Denies  o : painful urination, blood in urine  · Integument  o Denies  o : rash, itching  · Neurologic  o Denies  o : headache, weakness, loss of consciousness  · Musculoskeletal  o Denies  o : painful, swollen joints  · Psychiatric  o Denies  o : drug/alcohol addiction, anxiety, depression      Vitals  Date Time BP Position Site L\R Cuff Size HR RR TEMP (F) WT  HT  BMI kg/m2 BSA m2 O2 Sat FR L/min FiO2 HC       2021 08:19 AM      85 - R   236lbs 0oz 5'  4\" 40.51 2.2 99 %            Physical Examination  · Constitutional  o Appearance  o : well developed, well-nourished, no obvious deformities present  · Head and Face  o Head  o :   § Inspection  § : normocephalic  o Face  o :   § Inspection  § : no facial lesions  · Eyes  o Conjunctivae  o : conjunctivae normal  o Sclerae  o : sclerae white  · Ears, Nose, Mouth and Throat  o Ears  o :   § External Ears  § : appearance within normal limits  § Hearing  § : intact  o Nose  o :   § External Nose  § : appearance normal  · Neck  o Inspection/Palpation  o : normal appearance  o Range of " Motion  o : full range of motion  · Respiratory  o Respiratory Effort  o : breathing unlabored  o Inspection of Chest  o : normal appearance  o Auscultation of Lungs  o : no audible wheezing or rales  · Cardiovascular  o Heart  o : regular rate  · Gastrointestinal  o Abdominal Examination  o : soft and non-tender  · Skin and Subcutaneous Tissue  o General Inspection  o : intact, no rashes  · Psychiatric  o General  o : Alert and oriented x3  o Judgement and Insight  o : judgment and insight intact  o Mood and Affect  o : mood normal, affect appropriate  · Left Knee-Street  o Inspection  o : scar well healed, no limping gait, weight bearing, no swelling, no ecchymosis, no atrophy, neutral alignment  o Palpation  o : no medial joint line tenderness, no lateral joint line tenderness, no patellar tendon tenderness, no pain of MCL, no pain at LCL  o ROM  o : full extension, 105 flexion  o Strength  o : full extension, full flexion  o Special Tests  o : negative varus stress, negaitve valgus stress  o Neurovascular  o : Full sensation, Dorsal Pedal Pulse 2+, posteriror tibialis pulse 2+              Assessment  · Aftercare;following joint replacement     V54.81/Z47.1  · Pain: Knee     719.46/M25.569      Plan  · Medications  o Medications have been Reconciled  o Transition of Care or Provider Policy  · Instructions  o Reviewed the patient's Past Medical, Social, and Family history as well as the ROS at today's visit, no changes.  o Call or return if worsening symptoms.  o Follow Up in 4 weeks.   o Electronically Identified Patient Education Materials Provided Electronically     follow up x ray             Electronically Signed by: LEANDRO Sapp-ONEL -Author on February 26, 2021 08:28:54 AM  Electronically Co-signed by: Hannah Cerda MD -Reviewer on February 26, 2021 03:11:23 PM

## 2021-05-14 NOTE — PROGRESS NOTES
Progress Note      Patient Name: Slime Mcelroy   Patient ID: [id number]   Sex: Female   YOB: 1970    Primary Care Provider: Chacha SHAH   Referring Provider: Chacha SHAH    Visit Date: March 3, 2021    Provider: ALYSSA Lowe   Location: [facility]   Location Address: [address] [place] [address]  [id number]   Location Phone: [phone]          Chief Complaint  · Acute Visit for Sciatica Pain      History Of Present Illness  Slime Mcelroy is a 50 year old /White female who presents for evaluation and treatment of:      Acute Visit for Lt sided sciatica pain x 3 weeks. Pt [facility] and given steroid shot and diclofenac. Has been going to Physical therapy for Lt knee replacement. feels like her knee therapy is flaring her back.     Radiculopathy to left glute. Pt reports pain slightly improved with oral steroids.   Denies any bowel or bladder changes.           Past Medical History  Disease Name Date Onset Notes   Allergic rhinitis 05/14/2015 --    Ankle pain, left --  --    Arthralgia --  --    Arthritis --  --    Arthritis, Rheumatoid --  --    Arthropathy, unspecified --  --    Athletes foot --  --    Broken Bones --  --    Corns and callus --  --    Fatigue, unspecified type --  --    Heel pain 2000 --    Hemorrhoids, Unspecified, without Complications --  --    Hyperlipidemia --  --    Ingrowing toenail 2014 --    Kidney calculus --  --    Limb Swelling --  --    Plantar wart 1984 --    Primary osteoarthritis of left knee 05/01/2018 --    Renal Calculus --  --    Seasonal allergies --  --          Past Surgical History  Procedure Name Date Notes   Colonoscopy 08/07/2019 Mild diverticulosis of the left side of the colon, Polyp (6mm to 8mm) in the proximal rectum (Polypectomy).   Dilation and curretage --  --    elbow --  --    Hand surgery, left --  --    Joint Surgery --  --    Laparoscopy --  --     Tendon Repair --  --    Thumb surgery --  --    Toenail Removal 2014 --          Medication List  Name Date Started Instructions   clobetasol 0.05 % topical ointment 02/20/2020 apply to affected area by external route 2 times a day as needed   cyclobenzaprine 10 mg oral tablet  take 1 tablet (10 mg) by oral route PRN   EpiPen 2-Sony 0.3 mg/0.3 mL injection auto-injector 02/20/2020 inject 0.3 milliliter (0.3 mg) by intramuscular route once as needed for anaphylaxis for 30 days   famotidine 20 mg oral tablet  take 1 tablet (20 mg) by oral route 2 times per day   fluticasone propionate 50 mcg/actuation nasal spray,suspension 05/18/2020 PLACE TWO SPRAYS IN EACH NOSTRIL ONCE DAILY AS NEEDED   loratadine 10 mg oral tablet 05/18/2020 TAKE ONE TABLET BY MOUTH DAILY for 30 days   methotrexate sodium 2.5 mg oral tablet  take 7 tablets by oral route   Percocet 7.5-325 mg oral tablet 02/09/2021 take 1 tablet by oral route every 6 hours as needed   Singulair 10 mg oral tablet 02/20/2020 take 1 tablet (10 mg) by oral route once daily in the evening for 30 days   Ultram 50 mg oral tablet  take 1 tablet by oral route once a day (at bedtime)         Allergy List  Allergen Name Date Reaction Notes   amoxicillin --  --  --    Benadryl --  --  --    CEPHALOSPORINS --  --  --    Codeine Phosphate --  --  --    Codeine Sulfate --  --  --    hydrocodone-acetaminophen --  --  --          Family Medical History  Disease Name Relative/Age Notes   Stroke  grandparent   Heart Disease  grandparent   Diabetes, unspecified type Brother/  Sister/   Brother; Sister  Sister; Brother   Diabetes Mellitus, Type II  grandparent   Family history of certain chronic disabling diseases; arthritis Brother/  Father/  Mother/  Sister/  Son/   Mother; Father; Sister; Brother; Son  Mother  Mother; Sister; Brother; Son   Osteoporosis Mother/   Mother   Family history of Arthritis Brother/  Mother/  Sister/   Mother; Sister; Brother         Reproductive  "History  Menstrual   Certainty of LMP Date: 2007 Menopause Status: Postmenopausal   Pregnancy Summary   Total Pregnancies: 0 Full Term: 0 Premature: 0   Ab Induced: 0 Ab Spontaneous: 0 Ectopics: 0   Multiples: 0 Livin         Social History  Finding Status Start/Stop Quantity Notes   Alcohol Never --/-- --  --    Alcohol Use Never --/-- --  does not drink   Denies substance abuse --  --/-- --  --    lives with children --  --/-- --  --    Recreational Drug Use Never --/-- --  no   Retired. --  --/-- --  --    Sedentary --  --/-- --  --    Tobacco Current every day --/-- 1.5 PPD current every day smoker, 1.5 packs per day, smoked 21-30 years  1 packs per day  current every day smoker, 1 packs per day    --  --/-- --  --          Review of Systems  · Constitutional  o Denies  o : fatigue, fever, weight gain, weight loss, chills  · Cardiovascular  o Denies  o : chest Pain, palpitations, edema (swelling)  · Respiratory  o Denies  o : frequent cough, shortness of breath  · Gastrointestinal  o Denies  o : nausea, vomiting, changes in bowel habits  · Genitourinary  o Denies  o : dysuria, urinary frequency, urinary urgency, polyuria  · Neurologic  o Admits  o : tingling or numbness  o Denies  o : headache, dizziness  · Musculoskeletal  o Admits  o : back pain, knee pain  o Denies  o : joint pain, myalgias  · Psychiatric  o Denies  o : mood changes, memory changes, SI/HI      Vitals  Date Time BP Position Site L\R Cuff Size HR RR TEMP (F) WT  HT  BMI kg/m2 BSA m2 O2 Sat FR L/min FiO2 HC       10/05/2020 11:43 /77 Sitting    73 - R 18 97.6 246lbs 0oz 5'  4\" 42.23 2.24 97 %      2021 07:10 /53 Sitting    78 - R 18 97.6 236lbs 0oz 5'  4\" 40.51 2.2 97 %            Physical Examination  · Constitutional  o Appearance  o : well-nourished, in no acute distress  · Eyes  o Conjunctivae  o : conjunctivae normal  o Sclerae  o : sclerae white  o Pupils and Irises  o : pupils equal and " round  o Eyelids/Ocular Adnexae  o : eyelid appearance normal, no exudates present  · Respiratory  o Respiratory Effort  o : breathing unlabored  o Inspection of Chest  o : normal appearance  o Auscultation of Lungs  o : normal breath sounds throughout inspiration and expiration  · Cardiovascular  o Heart  o :   § Auscultation of Heart  § : regular rate and rhythm, no murmurs, gallops or rubs  o Peripheral Vascular System  o :   § Carotid Arteries  § : normal pulses bilaterally, no bruits present  · Gastrointestinal  o Abdominal Examination  o : abdomen nontender to palpation, tone normal without rigidity or guarding, no masses present, bowel sounds present  · Musculoskeletal  o Spine  o :   § Inspection/Palpation  § : moderate left tenderness to palpation present-lumbar region   § Range of Motion  § : spine range of motion normal  o Right Lower Extremity  o :   § Inspection/Palpation  § : no joint or limb tenderness to palpation, ROM normal  o Left Lower Extremity  o :   § Inspection/Palpation  § : no joint or limb tenderness to palpation, ROM normal  · Skin and Subcutaneous Tissue  o General Inspection  o : no rashes or lesions present, no areas of discoloration  o Body Hair  o : hair normal for age, general body hair distribution normal for age  o Digits and Nails  o : no clubbing, cyanosis, deformities or edema present, normal appearing nails  · Neurologic  o Mental Status Examination  o :   § Orientation  § : grossly oriented to person, place and time  o Gait and Station  o : normal gait, able to stand without difficulty  · Psychiatric  o Judgement and Insight  o : judgment and insight intact  o Mood and Affect  o : mood normal, affect appropriate          Assessment  · Screening for depression     V79.0/Z13.89  · Chronic left-sided low back pain with left-sided sciatica       Lumbago with sciatica, left side     724.2/M54.42  Other chronic pain     724.2/G89.29  · Moderate Sacroiliac  inflammation     720.2/M46.1      Plan  · Orders  o Annual depression screening, 15 minutes (, [address]) - V79.0/Z13.89 - 03/03/2021  o ACO-18: Negative screen for clinical depression using a standardized tool () - V79.0/Z13.89 - 03/03/2021  o PHYSICAL THERAPY/OCCUPATIONAL THERAPY CONSULTATION (Evaluate and Treat) (PTOTR) - 720.2/M46.1 - 03/03/2021   currently seeing PTA in [place].   o INDIRA Report (KASPR) - - 03/03/2021  o ACO-39: Current medications updated and reviewed (1159F, ) - - 03/03/2021  · Medications  o Medrol (Sony) 4 mg oral tablets,dose pack   SIG: take as directed for 6 days   DISP: (1) Package with 0 refills  Prescribed on 03/03/2021     o methocarbamol 750 mg oral tablet   SIG: take 1-2 tablets by oral route 3 times a day for 5 days   DISP: (30) Tablet with 0 refills  Prescribed on 03/03/2021     o gabapentin 300 mg oral capsule   SIG: take 1 capsule (300 mg) by oral route 3 times per day for 30 days   DISP: (90) Capsule with 0 refills  Prescribed on 03/03/2021     o Medications have been Reconciled  o Transition of Care or Provider Policy  · Instructions  o Depression Screen completed and scanned into the EMR under the designated folder within the patient's documents.  o Today's PHQ-9 result is __4_  o Obtained a written consent for INDIRA query. Discussed the risk and benefits of the use of controlled substances with the patient, including the risk of tolerance and drug dependence. The patient has been counseled on the need to have an exit strategy, including potentially discontinuing the use of controlled substances. INDIRA has or will be reviewed as soon as it becomes avaliable.  o Patient was educated/instructed on their diagnosis, treatment and medications prior to discharge from the clinic today.  o Call the office with any concerns or questions.  · Disposition  o Call or Return if symptoms worsen or persist.            Electronically Signed by: ALYSSA Lowe -Author on  March 3, 2021 10:16:27 AM

## 2021-05-14 NOTE — PROGRESS NOTES
Progress Note      Patient Name: Slime Mcelroy   Patient ID: [id number]   Sex: Female   YOB: 1970    Primary Care Provider: Chacha SHAH   Referring Provider: Chacha SHAH    Visit Date: March 25, 2021    Provider: Jp Ponce PA-C   Location: [facility]   Location Address: [address] [place] [address]  [id number]   Location Phone: [phone]          Chief Complaint  · Left TKA      History Of Present Illness  Slime Mcelroy is a 50 year old /White female who presents today to [facility].      Patient follows up today for left TKA performed 1/11/2021 by Dr. Cerda.  Patient reports pain has improved since the time of surgery.  Patient has been adherent to interventions and instructions.    Patient demonstrates decreased range of motion of the left knee than what is hoped for however she otherwise demonstrates full function of the left lower extremity.       Past Medical History  Allergic rhinitis; Ankle pain, left; Arthralgia; Arthritis; Arthritis, Rheumatoid; Arthropathy, unspecified; Athletes foot; Broken Bones; Corns and callus; Fatigue, unspecified type; Heel pain; Hemorrhoids, Unspecified, without Complications; Hyperlipidemia; Ingrowing toenail; Kidney calculus; Limb Swelling; Plantar wart; Primary osteoarthritis of left knee; Renal Calculus; Seasonal allergies         Past Surgical History  Colonoscopy; Dilation and curretage; elbow; Hand surgery, left; Joint Surgery; Laparoscopy; Tendon Repair; Thumb surgery; Toenail Removal         Medication List  cyclobenzaprine 10 mg oral tablet; EpiPen 2-Sony 0.3 mg/0.3 mL injection auto-injector; famotidine 20 mg oral tablet; fluticasone propionate 50 mcg/actuation nasal spray,suspension; gabapentin 300 mg oral capsule; loratadine 10 mg oral tablet; methocarbamol 750 mg oral tablet; Singulair 10 mg oral tablet         Allergy List  amoxicillin; Benadryl; CEPHALOSPORINS; Codeine Phosphate;  "Codeine Sulfate; hydrocodone-acetaminophen         Family Medical History  Stroke; Heart Disease; Diabetes, unspecified type; Diabetes Mellitus, Type II; Family history of certain chronic disabling diseases; arthritis; Osteoporosis; Family history of Arthritis         Reproductive History   0 Para 0 0 0 0 & Postmenopausal       Social History  Alcohol (Never); Alcohol Use (Never); Denies substance abuse; lives with children; Recreational Drug Use (Never); Retired.; Sedentary; Tobacco (Current every day);          Review of Systems  · Constitutional  o Denies  o : fever, chills, weight loss  · Cardiovascular  o Denies  o : chest pain, shortness of breath  · Gastrointestinal  o Denies  o : liver disease, heartburn, nausea, blood in stools  · Genitourinary  o Denies  o : painful urination, blood in urine  · Integument  o Denies  o : rash, itching  · Neurologic  o Denies  o : headache, weakness, loss of consciousness  · Musculoskeletal  o Denies  o : painful, swollen joints  · Psychiatric  o Denies  o : drug/alcohol addiction, anxiety, depression      Vitals  Date Time BP Position Site L\R Cuff Size HR RR TEMP (F) WT  HT  BMI kg/m2 BSA m2 O2 Sat FR L/min FiO2        2021 08:40 AM      86 - R   232lbs 16oz 5'  4\" 39.99 2.18 94 %            Physical Examination  · Constitutional  o Appearance  o : well developed, well-nourished, no obvious deformities present  · Head and Face  o Head  o :   § Inspection  § : normocephalic  o Face  o :   § Inspection  § : no facial lesions  · Eyes  o Conjunctivae  o : conjunctivae normal  o Sclerae  o : sclerae white  · Ears, Nose, Mouth and Throat  o Ears  o :   § External Ears  § : appearance within normal limits  § Hearing  § : intact  o Nose  o :   § External Nose  § : appearance normal  · Neck  o Inspection/Palpation  o : normal appearance  o Range of Motion  o : full range of motion  · Respiratory  o Respiratory Effort  o : breathing unlabored  o Inspection of " Chest  o : normal appearance  o Auscultation of Lungs  o : no audible wheezing or rales  · Cardiovascular  o Heart  o : regular rate  · Gastrointestinal  o Abdominal Examination  o : soft and non-tender  · Skin and Subcutaneous Tissue  o General Inspection  o : intact, no rashes  · Psychiatric  o General  o : Alert and oriented x3  o Judgement and Insight  o : judgment and insight intact  o Mood and Affect  o : mood normal, affect appropriate  · Left Knee  o Inspection  o : Surgical site is appreciated to be healing appropriately with good scar formation that is supple. Range of motion is 0 degrees of extension to 110 degrees of flexion with strength that is just slightly less than the contralateral side. Gait is nonantalgic and stable without the use of assistive devices.  · In Office Procedures  o View  o : AP/LATERAL/SUNRISE  o Site  o : left, knee  o Indication  o : Left knee pain  o Study  o : X-rays ordered, taken in the office, and reviewed today.  o Xray  o : Postsurgical changes of TKA with no appreciable loosening or displacement of retained hardware. All the bony alignments and positions are appreciated to be anatomic. There is no appreciable change compared to previous postoperative imaging.          Assessment  · Left knee pain, unspecified chronicity     719.46/M25.562  · Surgical aftercare, musculoskeletal system     V58.78/Z47.89  · History of left knee replacement     V43.65/Z96.652  · Decreased range of motion (ROM) of left knee     719.56/M25.662      Plan  · Orders  o Knee (Left) [facility] Preferred View ([id number]) - 719.46/M25.562 - 03/25/2021  · Medications  o Medications have been Reconciled  o Transition of Care or Provider Policy  · Instructions  o Reviewed the patient's Past Medical, Social, and Family history as well as the ROS at today's visit, no changes.  o Call or return if worsening symptoms.  o An extensive conversation with the patient the expected clinical course status post TKA was  discussed in detail. Patient is encouraged to continue with ROM exercises as discussed and demonstrated in clinic today. Patient is to follow-up in 3 months for further evaluation.  o Portions of this note were generated with voice recognition software. While efforts have been made to proofread the text, some sound alike errors may still persist.             Electronically Signed by: Jp Ponce PA-C -Author on March 29, 2021 07:59:07 AM

## 2021-05-14 NOTE — PROGRESS NOTES
Progress Note      Patient Name: Slime Mcelroy   Patient ID: [id number]   Sex: Female   YOB: 1970    Primary Care Provider: Chacha SHAH   Referring Provider: Chacha SHAH    Visit Date: February 18, 2021    Provider: Hannah Cerda MD   Location: [facility]   Location Address: [address] [place] [address]  [id number]   Location Phone: [phone]          Chief Complaint  · Left Total Knee Arthroplasty      History Of Present Illness  Slime Mcelroy is a 50 year old /White female who presents today to [facility].      Patient presents today for a follow-up of left total knee arthroplasty performed on 1/11/2021 by Dr. Cerda. Patient presents today for a wound check. Patient states yesterday, 2/17/2021, she had proximal opening of the scar with old blood was expressed. Patient states it was mildly tender. Patient states she has begun an antibiotic.       Past Medical History  Allergic rhinitis; Ankle pain, left; Arthralgia; Arthritis; Arthritis, Rheumatoid; Arthropathy, unspecified; Athletes foot; Broken Bones; Corns and callus; Fatigue, unspecified type; Heel pain; Hemorrhoids, Unspecified, without Complications; Hyperlipidemia; Ingrowing toenail; Kidney calculus; Limb Swelling; Plantar wart; Primary osteoarthritis of left knee; Renal Calculus; Seasonal allergies         Past Surgical History  Colonoscopy; Dilation and curretage; elbow; Hand surgery, left; Joint Surgery; Laparoscopy; Tendon Repair; Thumb surgery; Toenail Removal         Medication List  Bactrim -160 mg oral tablet; clobetasol 0.05 % topical ointment; cyclobenzaprine 10 mg oral tablet; EpiPen 2-Sony 0.3 mg/0.3 mL injection auto-injector; etodolac 500 mg oral tablet; famotidine 20 mg oral tablet; fluticasone propionate 50 mcg/actuation nasal spray,suspension; hydroxyzine HCl 25 mg oral tablet; Lidoderm 5 % topical adhesive patch,medicated; loratadine 10 mg oral tablet; Medrol  "(Sony) 4 mg oral tablets,dose pack; methotrexate sodium 2.5 mg oral tablet; Percocet 7.5-325 mg oral tablet; Singulair 10 mg oral tablet; Ultram 50 mg oral tablet         Allergy List  amoxicillin; Benadryl; CEPHALOSPORINS; Codeine Phosphate; Codeine Sulfate; hydrocodone-acetaminophen       Allergies Reconciled  Family Medical History  Stroke; Heart Disease; Diabetes, unspecified type; Diabetes Mellitus, Type II; Family history of certain chronic disabling diseases; arthritis; Osteoporosis; Family history of Arthritis         Reproductive History   0 Para 0 0 0 0 & Postmenopausal       Social History  Alcohol (Never); Alcohol Use (Never); Denies substance abuse; lives with children; Recreational Drug Use (Never); Retired.; Sedentary; Tobacco (Current every day);          Review of Systems  · Constitutional  o Denies  o : fever, chills, weight loss  · Cardiovascular  o Denies  o : chest pain, shortness of breath  · Gastrointestinal  o Denies  o : liver disease, heartburn, nausea, blood in stools  · Genitourinary  o Denies  o : painful urination, blood in urine  · Integument  o Denies  o : rash, itching  · Neurologic  o Denies  o : headache, weakness, loss of consciousness  · Musculoskeletal  o Denies  o : painful, swollen joints  · Psychiatric  o Denies  o : drug/alcohol addiction, anxiety, depression      Vitals  Date Time BP Position Site L\R Cuff Size HR RR TEMP (F) WT  HT  BMI kg/m2 BSA m2 O2 Sat FR L/min FiO2 HC       2021 11:32 AM      73 - R   245lbs 0oz 5'  4\" 42.05 2.24 97 %            Physical Examination  · Constitutional  o Appearance  o : well developed, well-nourished, no obvious deformities present  · Head and Face  o Head  o :   § Inspection  § : normocephalic  o Face  o :   § Inspection  § : no facial lesions  · Eyes  o Conjunctivae  o : conjunctivae normal  o Sclerae  o : sclerae white  · Ears, Nose, Mouth and Throat  o Ears  o :   § External Ears  § : appearance within normal " limits  § Hearing  § : intact  o Nose  o :   § External Nose  § : appearance normal  · Neck  o Inspection/Palpation  o : normal appearance  o Range of Motion  o : full range of motion  · Respiratory  o Respiratory Effort  o : breathing unlabored  o Inspection of Chest  o : normal appearance  o Auscultation of Lungs  o : no audible wheezing or rales  · Cardiovascular  o Heart  o : regular rate  · Gastrointestinal  o Abdominal Examination  o : soft and non-tender  · Skin and Subcutaneous Tissue  o General Inspection  o : intact, no rashes  · Psychiatric  o General  o : Alert and oriented x3  o Judgement and Insight  o : judgment and insight intact  o Mood and Affect  o : mood normal, affect appropriate  · Left Knee  o Inspection  o : Small proximal suture abscess, no signs of infection. Flexion to 100. Full extension. Calf supple, non-tender. Sensation grossly intact. Neurovascular intact. Mild tenderness. Full weight bearing.           Assessment  · Aftercare;following left total knee arthroplasty     V54.81/Z47.1  · Left knee pain, unspecified chronicity     719.46/M25.562  · Left suture abscess     682.9/L02.91      Plan  · Medications  o Medications have been Reconciled  o Transition of Care or Provider Policy  · Instructions  o Dr. Cerda saw and examined the patient and agrees with plan.   o Reviewed the patient's Past Medical, Social, and Family history as well as the ROS at today's visit, no changes.  o Call or return if worsening symptoms.  o This note was transcribed by Maria M Dent.   o Discussed treatment plans with the patient. Patient will continue antibiotics and follow-up with us as scheduled.             Electronically Signed by: Maria M Dent-, Other -Author on February 18, 2021 02:33:02 PM  Electronically Co-signed by: Hannah Cerda MD -Reviewer on February 18, 2021 06:58:37 PM

## 2021-05-15 VITALS — HEIGHT: 64 IN | WEIGHT: 244 LBS | OXYGEN SATURATION: 97 % | HEART RATE: 84 BPM | BODY MASS INDEX: 41.66 KG/M2

## 2021-05-15 VITALS
HEIGHT: 64 IN | DIASTOLIC BLOOD PRESSURE: 73 MMHG | OXYGEN SATURATION: 96 % | BODY MASS INDEX: 39.78 KG/M2 | WEIGHT: 233 LBS | RESPIRATION RATE: 18 BRPM | SYSTOLIC BLOOD PRESSURE: 119 MMHG | HEART RATE: 54 BPM

## 2021-05-15 VITALS
HEIGHT: 64 IN | SYSTOLIC BLOOD PRESSURE: 126 MMHG | OXYGEN SATURATION: 97 % | DIASTOLIC BLOOD PRESSURE: 37 MMHG | TEMPERATURE: 98 F

## 2021-05-15 VITALS — WEIGHT: 242 LBS | HEIGHT: 64 IN | BODY MASS INDEX: 41.32 KG/M2 | HEART RATE: 80 BPM | OXYGEN SATURATION: 96 %

## 2021-05-15 VITALS — BODY MASS INDEX: 41.32 KG/M2 | HEIGHT: 64 IN | WEIGHT: 242 LBS

## 2021-05-15 VITALS — HEART RATE: 76 BPM | WEIGHT: 244.37 LBS | BODY MASS INDEX: 41.72 KG/M2 | HEIGHT: 64 IN | OXYGEN SATURATION: 98 %

## 2021-05-15 VITALS
HEIGHT: 64 IN | DIASTOLIC BLOOD PRESSURE: 57 MMHG | BODY MASS INDEX: 40.46 KG/M2 | HEART RATE: 69 BPM | SYSTOLIC BLOOD PRESSURE: 96 MMHG | WEIGHT: 237 LBS | TEMPERATURE: 98.1 F | RESPIRATION RATE: 21 BRPM | OXYGEN SATURATION: 96 %

## 2021-05-15 VITALS
DIASTOLIC BLOOD PRESSURE: 56 MMHG | BODY MASS INDEX: 41.83 KG/M2 | RESPIRATION RATE: 24 BRPM | HEIGHT: 64 IN | OXYGEN SATURATION: 97 % | WEIGHT: 245 LBS | HEART RATE: 86 BPM | SYSTOLIC BLOOD PRESSURE: 133 MMHG

## 2021-05-15 VITALS
OXYGEN SATURATION: 99 % | RESPIRATION RATE: 28 BRPM | DIASTOLIC BLOOD PRESSURE: 46 MMHG | SYSTOLIC BLOOD PRESSURE: 110 MMHG | WEIGHT: 238 LBS | BODY MASS INDEX: 40.63 KG/M2 | TEMPERATURE: 97.9 F | HEIGHT: 64 IN | HEART RATE: 66 BPM

## 2021-05-15 VITALS — BODY MASS INDEX: 40.56 KG/M2 | WEIGHT: 237.56 LBS | HEIGHT: 64 IN | RESPIRATION RATE: 14 BRPM

## 2021-05-15 VITALS
RESPIRATION RATE: 18 BRPM | WEIGHT: 241 LBS | SYSTOLIC BLOOD PRESSURE: 128 MMHG | TEMPERATURE: 98.1 F | OXYGEN SATURATION: 93 % | HEIGHT: 64 IN | DIASTOLIC BLOOD PRESSURE: 87 MMHG | BODY MASS INDEX: 41.15 KG/M2 | HEART RATE: 71 BPM

## 2021-05-16 VITALS
BODY MASS INDEX: 42.34 KG/M2 | DIASTOLIC BLOOD PRESSURE: 70 MMHG | TEMPERATURE: 98.2 F | OXYGEN SATURATION: 98 % | SYSTOLIC BLOOD PRESSURE: 129 MMHG | RESPIRATION RATE: 20 BRPM | WEIGHT: 248 LBS | HEIGHT: 64 IN | HEART RATE: 78 BPM

## 2021-05-16 VITALS
OXYGEN SATURATION: 98 % | DIASTOLIC BLOOD PRESSURE: 56 MMHG | HEIGHT: 64 IN | WEIGHT: 246 LBS | SYSTOLIC BLOOD PRESSURE: 115 MMHG | HEART RATE: 62 BPM | RESPIRATION RATE: 20 BRPM | BODY MASS INDEX: 42 KG/M2 | TEMPERATURE: 97.7 F

## 2021-05-16 VITALS
HEART RATE: 62 BPM | BODY MASS INDEX: 41.48 KG/M2 | HEIGHT: 64 IN | TEMPERATURE: 98.9 F | OXYGEN SATURATION: 96 % | SYSTOLIC BLOOD PRESSURE: 114 MMHG | DIASTOLIC BLOOD PRESSURE: 52 MMHG | WEIGHT: 243 LBS | RESPIRATION RATE: 12 BRPM

## 2021-05-16 VITALS
HEIGHT: 64 IN | TEMPERATURE: 98 F | WEIGHT: 247 LBS | BODY MASS INDEX: 42.17 KG/M2 | HEART RATE: 70 BPM | DIASTOLIC BLOOD PRESSURE: 56 MMHG | SYSTOLIC BLOOD PRESSURE: 114 MMHG | OXYGEN SATURATION: 96 % | RESPIRATION RATE: 18 BRPM

## 2021-05-16 VITALS
WEIGHT: 245 LBS | BODY MASS INDEX: 41.83 KG/M2 | OXYGEN SATURATION: 96 % | SYSTOLIC BLOOD PRESSURE: 108 MMHG | HEIGHT: 64 IN | RESPIRATION RATE: 20 BRPM | TEMPERATURE: 98.3 F | HEART RATE: 78 BPM | DIASTOLIC BLOOD PRESSURE: 50 MMHG

## 2021-05-16 VITALS — OXYGEN SATURATION: 98 % | BODY MASS INDEX: 41.91 KG/M2 | HEIGHT: 64 IN | WEIGHT: 245.5 LBS | HEART RATE: 69 BPM

## 2021-05-16 VITALS — OXYGEN SATURATION: 96 % | HEIGHT: 64 IN | HEART RATE: 59 BPM | BODY MASS INDEX: 41.83 KG/M2 | WEIGHT: 245 LBS

## 2021-05-16 VITALS
OXYGEN SATURATION: 96 % | HEART RATE: 81 BPM | TEMPERATURE: 97.6 F | SYSTOLIC BLOOD PRESSURE: 120 MMHG | RESPIRATION RATE: 20 BRPM | WEIGHT: 234 LBS | BODY MASS INDEX: 39.95 KG/M2 | HEIGHT: 64 IN | DIASTOLIC BLOOD PRESSURE: 70 MMHG

## 2021-05-16 VITALS
HEIGHT: 64 IN | HEART RATE: 68 BPM | TEMPERATURE: 98.9 F | OXYGEN SATURATION: 97 % | SYSTOLIC BLOOD PRESSURE: 114 MMHG | DIASTOLIC BLOOD PRESSURE: 61 MMHG | RESPIRATION RATE: 21 BRPM | BODY MASS INDEX: 42.68 KG/M2 | WEIGHT: 250 LBS

## 2021-05-16 VITALS
WEIGHT: 241 LBS | HEIGHT: 64 IN | DIASTOLIC BLOOD PRESSURE: 59 MMHG | HEART RATE: 65 BPM | TEMPERATURE: 98.6 F | SYSTOLIC BLOOD PRESSURE: 125 MMHG | OXYGEN SATURATION: 97 % | BODY MASS INDEX: 41.15 KG/M2 | RESPIRATION RATE: 20 BRPM

## 2021-05-16 VITALS — HEART RATE: 61 BPM | HEIGHT: 64 IN | WEIGHT: 244.5 LBS | BODY MASS INDEX: 41.74 KG/M2 | OXYGEN SATURATION: 98 %

## 2021-05-17 ENCOUNTER — HOSPITAL ENCOUNTER (OUTPATIENT)
Dept: FAMILY MEDICINE CLINIC | Facility: CLINIC | Age: 51
Discharge: HOME | End: 2021-05-17
Attending: NURSE PRACTITIONER

## 2021-05-17 ENCOUNTER — OFFICE VISIT CONVERTED (OUTPATIENT)
Dept: FAMILY MEDICINE CLINIC | Facility: CLINIC | Age: 51
End: 2021-05-17
Attending: NURSE PRACTITIONER

## 2021-05-17 LAB
ALBUMIN SERPL-MCNC: 4.2 G/DL (ref 3.5–5)
ALBUMIN/GLOB SERPL: 1.1 {RATIO} (ref 1.4–2.6)
ALP SERPL-CCNC: 112 U/L (ref 53–141)
ALT SERPL-CCNC: 8 U/L (ref 10–40)
ANION GAP SERPL CALC-SCNC: 14 MMOL/L (ref 8–19)
AST SERPL-CCNC: 15 U/L (ref 15–50)
BASOPHILS # BLD AUTO: 0.04 10*3/UL (ref 0–0.2)
BASOPHILS NFR BLD AUTO: 0.5 % (ref 0–3)
BILIRUB SERPL-MCNC: 0.21 MG/DL (ref 0.2–1.3)
BUN SERPL-MCNC: 10 MG/DL (ref 5–25)
BUN/CREAT SERPL: 13 {RATIO} (ref 6–20)
CALCIUM SERPL-MCNC: 9.4 MG/DL (ref 8.7–10.4)
CHLORIDE SERPL-SCNC: 103 MMOL/L (ref 99–111)
CHOLEST SERPL-MCNC: 166 MG/DL (ref 107–200)
CHOLEST/HDLC SERPL: 4.6 {RATIO} (ref 3–6)
CONV ABS IMM GRAN: 0.02 10*3/UL (ref 0–0.2)
CONV CO2: 27 MMOL/L (ref 22–32)
CONV IMMATURE GRAN: 0.2 % (ref 0–1.8)
CONV TOTAL PROTEIN: 8.1 G/DL (ref 6.3–8.2)
CREAT UR-MCNC: 0.78 MG/DL (ref 0.5–0.9)
DEPRECATED RDW RBC AUTO: 44.1 FL (ref 36.4–46.3)
EOSINOPHIL # BLD AUTO: 0.13 10*3/UL (ref 0–0.7)
EOSINOPHIL # BLD AUTO: 1.6 % (ref 0–7)
ERYTHROCYTE [DISTWIDTH] IN BLOOD BY AUTOMATED COUNT: 13.6 % (ref 11.7–14.4)
GFR SERPLBLD BASED ON 1.73 SQ M-ARVRAT: >60 ML/MIN/{1.73_M2}
GLOBULIN UR ELPH-MCNC: 3.9 G/DL (ref 2–3.5)
GLUCOSE SERPL-MCNC: 92 MG/DL (ref 65–99)
HCT VFR BLD AUTO: 43.1 % (ref 37–47)
HDLC SERPL-MCNC: 36 MG/DL (ref 40–60)
HGB BLD-MCNC: 13.9 G/DL (ref 12–16)
LDLC SERPL CALC-MCNC: 98 MG/DL (ref 70–100)
LYMPHOCYTES # BLD AUTO: 3.26 10*3/UL (ref 1–5)
LYMPHOCYTES NFR BLD AUTO: 39.3 % (ref 20–45)
MCH RBC QN AUTO: 28.8 PG (ref 27–31)
MCHC RBC AUTO-ENTMCNC: 32.3 G/DL (ref 33–37)
MCV RBC AUTO: 89.4 FL (ref 81–99)
MONOCYTES # BLD AUTO: 0.67 10*3/UL (ref 0.2–1.2)
MONOCYTES NFR BLD AUTO: 8.1 % (ref 3–10)
NEUTROPHILS # BLD AUTO: 4.18 10*3/UL (ref 2–8)
NEUTROPHILS NFR BLD AUTO: 50.3 % (ref 30–85)
NRBC CBCN: 0 % (ref 0–0.7)
OSMOLALITY SERPL CALC.SUM OF ELEC: 289 MOSM/KG (ref 273–304)
PLATELET # BLD AUTO: 321 10*3/UL (ref 130–400)
PMV BLD AUTO: 11.2 FL (ref 9.4–12.3)
POTASSIUM SERPL-SCNC: 4.1 MMOL/L (ref 3.5–5.3)
RBC # BLD AUTO: 4.82 10*6/UL (ref 4.2–5.4)
SODIUM SERPL-SCNC: 140 MMOL/L (ref 135–147)
TRIGL SERPL-MCNC: 158 MG/DL (ref 40–150)
TSH SERPL-ACNC: 2.2 M[IU]/L (ref 0.27–4.2)
VLDLC SERPL-MCNC: 32 MG/DL (ref 5–37)
WBC # BLD AUTO: 8.3 10*3/UL (ref 4.8–10.8)

## 2021-05-22 ENCOUNTER — TRANSCRIBE ORDERS (OUTPATIENT)
Dept: ADMINISTRATIVE | Facility: HOSPITAL | Age: 51
End: 2021-05-22

## 2021-05-22 DIAGNOSIS — Z92.89 HISTORY OF MAMMOGRAPHY, SCREENING: Primary | ICD-10-CM

## 2021-05-28 VITALS
BODY MASS INDEX: 39.12 KG/M2 | DIASTOLIC BLOOD PRESSURE: 85 MMHG | RESPIRATION RATE: 18 BRPM | SYSTOLIC BLOOD PRESSURE: 130 MMHG | WEIGHT: 243.39 LBS | OXYGEN SATURATION: 98 % | HEIGHT: 66 IN | HEART RATE: 83 BPM | TEMPERATURE: 98.7 F

## 2021-05-28 NOTE — PROGRESS NOTES
Patient: SLIME BROWN     Acct: [id number]     Report: #[id number]  UNIT #: [id number]     : 1970    Encounter Date:10/22/2020  PRIMARY CARE: WILDER GALLO  ***ISignsaida***  --------------------------------------------------------------------------------------------------------------------  NURSE INTAKE      Visit Type      New Patient Visit            Chief Complaint      ADH      Intent of Therapy:  Curative            Referring Provider/Copies To      Primary Care Provider:  WILDER GALLO      Copies To:   WILDER GALLO            History and Present Illness      Past Oncology Illness History      Ms. Slime Brown is a pleasant 49-year-old female who presents to [facility] for     atypical ductal hyperplasia of the right breast.  She was referred to me by her     primary care provider, Wilder Gallo.            Ms. Brown routinely has screening mammograms.   she underwent a     regular screening mammography and was reported as normal. On 2020,     Ms. Brown underwent routine mammography.  Findings indicated small grouped     calcifications in the upper outer right breast and the left breast was benign.      On 2020, Ms. Brown underwent a diagnostic mammogram which confirmed    indeterminate grouped calcifications upper outer right breast and     recommendations for biopsy.  On September 15, 2020, a stereotactic guided core     biopsy was obtained.  Pathology (S-) indicated focal atypical ductal     hyperplasia and atypical lobular hyperplasia with microcalcifications no     malignancy was identified.              No family history of breast cancer.  No history of hormone replacement therapy     use.  Patient states she went through menopause at the age of 33.  Her mother     and other women in the family did the same.            Cancer Details            Right breast, ADH            ECOG Performance Status      0            PAST, FAMILY   Past Medical  History      Past Medical History:  Arthritis      Other PMH:        Rheumatoid arthritis      History of anaphylaxis and hives      Other Hematology History:        Atypical ductal hyperplasia of the breast            Past Surgical History            D  SURGERIES            Family History      Family History:  No Family History            Social History      Marital Status:        Lives independently:  Yes      Number of Children:  1            Tobacco Use      Tobacco status:  Current every day smoker      Smoking packs/day:  1      Smoking history:  10-25 pack years            Alcohol Use      Alcohol intake:  None            Substance Use      Substance use:  Denies use            REVIEW OF SYSTEMS      General:  Denies: Appetite Change, Fatigue, Fever, Night Sweats, Weight Gain,     Weight Loss      Eye:  Denies Blurred Vision, Denies Corrective Lenses, Denies Diplopia, Denies     Vision Changes      ENT:  Denies Headache, Denies Hearing Loss, Denies Hoarseness, Denies Sore     Throat      Cardiovascular:  Denies Chest Pain, Denies Palpitations      Respiratory:  Denies: Cough, Coughing Blood, Productive Cough, Shortness of Air,    Wheezing      Gastrointestinal:  Denies Bloody Stools, Denies Constipation, Denies Diarrhea,     Denies Nausea/Vomiting, Denies Problem Swallowing, Denies Unable to Control     Bowels      Genitourinary:  Denies Blood in Urine, Denies Incontinence, Denies Painful     Urination      Musculoskeletal:  Denies Back Pain, Denies Muscle Pain; Admits Painful Joints      Integumentary:  Denies Itching, Denies Lesions, Denies Rash      Neurologic:  Denies Dizziness, Denies Numbness\Tingling, Denies Seizures      Psychiatric:  Denies Anxiety, Denies Depression      Endocrine:  Denies Cold Intolerance, Denies Heat Intolerance      Hematologic/Lymphatic:  Denies Bruising, Denies Bleeding, Denies Enlarged Lymph     Nodes      Reproductive:  Denies: Menopause, Heavy Periods, Pregnant, Still  Menstruating            VITAL SIGNS AND SCORES      Vitals      Height 5 ft 5.75 in / 167 cm      Weight 243 lbs 6.205 oz / 110.4 kg      BSA 2.17 m2      BMI 39.6 kg/m2      Temperature 98.7 F / 37.06 C - Temporal      Pulse 83      Respirations 18      Blood Pressure 130/85 Sitting, Left Arm      Pulse Oximetry 98%, ROOM AIR            Pain Score      Experiencing any pain?:  Yes      Pain Scale Used:  Numerical      Pain Intensity:  1            Fatigue Score      Experiencing any fatigue?:  No      Fatigue (0-10 scale):  0 (none)            EXAM      General: Alert, cooperative, no acute distress      Eyes: Anicteric sclera, PERRLA      HEENT: Oropharynx clear, no exudates      Respiratory: CTAB, normal respiratory effort      Abdomen: Normal active bowel sounds, no tenderness, no distention      Cardiovascular: RRR, no murmur, no peripheral edema      Skin: Normal tone, no rash, no lesions      Psychiatric: Appropriate affect, intact judgment      Neurologic: No focal sensory or motor deficits, no weakness, numbness, dizziness      Musculoskeletal: Normal muscle strength, normal muscle tone      Extremities: No clubbing, cyanosis, or deformities            PREVENTION      2 or More Falls in Past Year?:  No      Fall Past Year with Injury?:  No      Encouraged to follow-up with:  PCP regarding preventative exams.      Chart initiated by      GUY KUMAR            ALLERGY/MEDS      Allergies      Coded Allergies:             AMOXICILLIN (Verified  Allergy, Severe, ANAPHYLACTIC SHOCK, 10/22/20)           CEPHALEXIN MONOHYDRATE (Verified  Allergy, Unknown, UNK, 10/22/20)           CODEINE (Verified  Allergy, Unknown, HIVES, 10/22/20)           DIPHENHYDRAMINE HCL (Verified  Allergy, Unknown, HIVES, 10/22/20)           HYDROCODONE BIT (Verified  Allergy, Unknown, HIVES, 10/22/20)      Uncoded Allergies:             TICK BITE (Allergy, Unknown, ANAPHALACTIC REACTION, 10/1/20)            Medications      Last  Reconciled on 10/28/20 22:13 by JESSE POLK      Anastrozole (Anastrozole) 1 Mg Tablet      1 MG PO QDAY, #30 TAB 2 Refills         Prov: JESSE POLK         10/22/20       Erythromycin (Erythromycin 0.5% Ophth*) 3.5 Gm Oint...g.      1 APL EYE RT TID for 5 Days, #1 TUBE         Prov: CHERRY MADRID cfe         10/19/20       Famotidine (Famotidine) 20 Mg Tablet      20 MG PO BID for 30 Days, #60 TAB 0 Refills         Reported         10/1/20       Etodolac (Lodine) 500 Mg Tab      500 MG PO QDAY PRN for PAIN, TAB         Reported         10/1/20       Loratadine (Claritin) 10 Mg Tablet      10 MG PO HS, #30 TAB 0 Refills         Reported         9/10/20       Methotrexate (Methotrexate) 2.5 Mg Tablet      15 PO QMONDAY, TAB         Reported         8/6/19       EPINEPHrine HCl (Epipen 2-Sony) 0.3 Mg/0.3 Ml Auto.injct      0.3 MG IM ASDIR, #2 SYRINGE 11 Refills         Reported         7/20/16      Medications Reviewed:  No Changes made to meds            IMPRESSION/PLAN      Diagnosis      Notes      New Medications      * Anastrozole 1 MG TABLET: 1 MG PO QDAY #30            Plan      Atypical ductal hyperplasia of the right breast: I filled out the BRISK tool     online with the patient using her information.  Results show that her 5-year     risk of developing breast cancer is 3.6%.  This compares to an average risk for     woman her age of 1.3%.  Her lifetime risk of developing breast cancer is 27.8%.     This compares to an average risk of 11% in women her age.  Based on this     significantly elevated risk I do recommend the patient take an aromatase     inhibitor for 5 years.  We discussed the risks and benefits to anastrozole.  She    states that her rheumatologist gets bone density tests on her.  We will attempt     to get results from the most recent bone density test.  I will call the patient     in 4 weeks to follow-up on any symptoms.            Patient Education      Patient Education Provided:   Yes            Electronically signed by JESSE POLK  10/28/2020 22:13       Disclaimer: Converted document may not contain table formatting or lab diagrams. Please see [employer] for the authenticated document.

## 2021-05-28 NOTE — PROGRESS NOTES
Patient: CUCO BROWN     Acct: [id number]     Report: #[id number]  UNIT #: [id number]     : 1970    Encounter Date:2020  PRIMARY CARE: CHACHA GALLO  ***Signed***  --------------------------------------------------------------------------------------------------------------------  TELEHEALTH NOTE      Past Oncology Illness History      Ms. Cuco Brown is a pleasant 49-year-old female who presents to [facility] for     atypical ductal hyperplasia of the right breast.  She was referred to me by her     primary care provider, Chacha Gallo.            Ms. Brown routinely has screening mammograms.   she underwent a     regular screening mammography and was reported as normal. On 2020,     Ms. Brown underwent routine mammography.  Findings indicated small grouped ca    lcifications in the upper outer right breast and the left breast was benign.  On    2020, Ms. Brown underwent a diagnostic mammogram which confirmed     indeterminate grouped calcifications upper outer right breast and     recommendations for biopsy.  On September 15, 2020, a stereotactic guided core     biopsy was obtained.  Pathology (S-) indicated focal atypical ductal hype    rplasia and atypical lobular hyperplasia with microcalcifications no malignancy     was identified.              No family history of breast cancer.  No history of hormone replacement therapy     use.  Patient states she went through menopause at the age of 33.  Her mother     and other women in the family did the same.            Patient tried anastrozole in 2020 but quit due to severe joint pain and     not wished a trial of another AI.            History of Present Illness            Chief Complaint: (ADH)            Cuco Brown is presenting for evaluation via Telehealth visit by phone.     Verbal consent obtained before beginning visit.            Provider spent 5 minutes with the patient  during telehealth visit.            The following staff were present during the visit: (Jacey Ruby, RN)                         Overview of Symptoms      I contacted the patient today for follow-up on her after starting anastrozole.      The patient states that her joint pain got 10 times worse from her hips to her     feet.  I discussed the option of starting a different aromatase inhibitor but     she was not interested.  Chacha Hurst is her primary care provider and has     ordered her mammograms in the past.  The patient would likely continue getting     them through her primary care provider.            Allergies/Medications      Allergies:        Coded Allergies:             AMOXICILLIN (Verified  Allergy, Severe, ANAPHYLACTIC SHOCK, 12/4/20)           CEPHALEXIN MONOHYDRATE (Verified  Allergy, Unknown, UNK, 12/4/20)           CODEINE (Verified  Allergy, Unknown, HIVES, 12/4/20)           DIPHENHYDRAMINE HCL (Verified  Allergy, Unknown, HIVES, 12/4/20)           HYDROCODONE BIT (Verified  Allergy, Unknown, HIVES, 12/4/20)      Uncoded Allergies:             TICK BITE (Allergy, Unknown, ANAPHALACTIC REACTION, 10/1/20)      Medications    Last Reconciled on 12/26/20 18:06 by JESSE POLK      valACYclovir (valACYclovir) 1,000 Mg Tablet      1000 MG PO TID PRN for shingles, #21 TAB         Prov: OXNARD,BLANCA cfr         11/21/20       Famotidine (Famotidine) 20 Mg Tablet      20 MG PO BID for 30 Days, #60 TAB 0 Refills         Reported         10/1/20       Etodolac (Lodine) 500 Mg Tab      500 MG PO QDAY PRN for PAIN, TAB         Reported         10/1/20       Loratadine (Claritin) 10 Mg Tablet      10 MG PO HS, #30 TAB 0 Refills         Reported         9/10/20       Methotrexate (Methotrexate) 2.5 Mg Tablet      15 PO QMONDAY, TAB         Reported         8/6/19       EPINEPHrine HCl (Epipen 2-Sony) 0.3 Mg/0.3 Ml Auto.injct      0.3 MG IM ASDIR, #2 SYRINGE 11 Refills         Reported         7/20/16             Plan/Instructions            * Plan Of Care: (Atypical ductal hyperplasia of the right breast)      * Patient was started on anastrozole for breast cancer risk reduction.  Her       lifetime risk of developing breast cancer was determined 2 be 27.8% by the       BRISK tool.  However, the patient developed severe joint pain shortly after       starting the medication and stopped it.  She does not wish the start another       aromatase inhibitor.  Given that her annual screening mammograms are ordered       by her primary care provider, the patient does not need follow-up in this       clinic unless additional concerns arise.  The patient agrees with this plan so      no further appointment will be made.            * Chronic conditions reviewed and taken into consideration for today's treatment      plan.      * Patient instructed to seek medical attention urgently for new or worsening       symptoms.      * Patient was educated/instructed on their diagnosis, treatment and medications       prior to discharge from the clinic today.      Codes:  Phone Eval 5-10 min [id number]            Electronically signed by JESSE POLK  12/26/2020 18:06       Disclaimer: Converted document may not contain table formatting or lab diagrams. Please see [employer] for the authenticated document.

## 2021-06-05 NOTE — PROGRESS NOTES
Progress Note      Patient Name: Slime Mcelroy   Patient ID: [id number]   Sex: Female   YOB: 1970    Primary Care Provider: Chacha SHAH   Referring Provider: Chacha SHAH    Visit Date: May 17, 2021    Provider: ALYSSA Lowe   Location: [facility]   Location Address: [address] [place] [address]  [id number]   Location Phone: [phone]          Chief Complaint  · follow up for Arthritis and Hyperlipidemia      History Of Present Illness  Slime Mcelroy is a 50 year old /White female who presents for evaluation and treatment of:      follow up for Arthritis, Hyperlipidemia, and Sciatica  Last lab work done 12/28/20 by Dr. Cerda    Left sciatic nerve pain - improved with gabapentin.    AR - improved with medication.    Tension h/a last week resolved after 3 days.     Narcotic Consent- 3/3/21       Past Medical History  Disease Name Date Onset Notes   Allergic rhinitis 05/14/2015 --    Ankle pain, left --  --    Arthralgia --  --    Arthritis --  --    Arthritis, Rheumatoid --  --    Arthropathy, unspecified --  --    Athletes foot --  --    Broken Bones --  --    Corns and callus --  --    Fatigue, unspecified type --  --    Heel pain 2000 --    Hemorrhoids, Unspecified, without Complications --  --    Hyperlipidemia --  --    Ingrowing toenail 2014 --    Kidney calculus --  --    Limb Swelling --  --    Plantar wart 1984 --    Primary osteoarthritis of left knee 05/01/2018 --    Renal Calculus --  --    Seasonal allergies --  --          Past Surgical History  Procedure Name Date Notes   Colonoscopy 08/07/2019 Mild diverticulosis of the left side of the colon, Polyp (6mm to 8mm) in the proximal rectum (Polypectomy).   Dilation and curretage --  --    elbow --  --    Hand surgery, left --  --    Joint Surgery --  --    Laparoscopy --  --    Tendon Repair --  --    Thumb surgery --  --    Toenail Removal 2014 --           Medication List  Name Date Started Instructions   cyclobenzaprine 10 mg oral tablet  take 1 tablet (10 mg) by oral route PRN   EpiPen 2-Sony 0.3 mg/0.3 mL injection auto-injector 2020 inject 0.3 milliliter (0.3 mg) by intramuscular route once as needed for anaphylaxis for 30 days   famotidine 20 mg oral tablet  take 1 tablet (20 mg) by oral route 2 times per day   fluticasone propionate 50 mcg/actuation nasal spray,suspension 2020 PLACE TWO SPRAYS IN EACH NOSTRIL ONCE DAILY AS NEEDED   gabapentin 300 mg oral capsule 2021 take 1 capsule (300 mg) by oral route 3 times per day for 30 days   loratadine 10 mg oral tablet 2020 TAKE ONE TABLET BY MOUTH DAILY for 30 days   methocarbamol 750 mg oral tablet 2021 take 1-2 tablets by oral route 3 times a day for 5 days   Singulair 10 mg oral tablet 2020 take 1 tablet (10 mg) by oral route once daily in the evening for 30 days         Allergy List  Allergen Name Date Reaction Notes   amoxicillin --  --  --    Benadryl --  --  --    CEPHALOSPORINS --  --  --    Codeine Phosphate --  --  --    Codeine Sulfate --  --  --    hydrocodone-acetaminophen --  --  --          Family Medical History  Disease Name Relative/Age Notes   Stroke  grandparent   Heart Disease  grandparent   Diabetes, unspecified type Brother/  Sister/   Brother; Sister  Sister; Brother   Diabetes Mellitus, Type II  grandparent   Family history of certain chronic disabling diseases; arthritis Brother/  Father/  Mother/  Sister/  Son/   Mother; Father; Sister; Brother; Son  Mother  Mother; Sister; Brother; Son   Osteoporosis Mother/   Mother   Family history of Arthritis Brother/  Mother/  Sister/   Mother; Sister; Brother         Reproductive History  Menstrual   Certainty of LMP Date: 2007 Menopause Status: Postmenopausal   Pregnancy Summary   Total Pregnancies: 0 Full Term: 0 Premature: 0   Ab Induced: 0 Ab Spontaneous: 0 Ectopics: 0   Multiples: 0 Livin  "        Social History  Finding Status Start/Stop Quantity Notes   Alcohol Never --/-- --  --    Alcohol Use Never --/-- --  does not drink   Denies substance abuse --  --/-- --  --    lives with children --  --/-- --  --    Recreational Drug Use Never --/-- --  no   Retired. --  --/-- --  --    Sedentary --  --/-- --  --    Tobacco Current every day --/-- 1.5 PPD current every day smoker, 1.5 packs per day, smoked 21-30 years  1 packs per day  current every day smoker, 1 packs per day    --  --/-- --  --          Review of Systems  · Constitutional  o Denies  o : fatigue, fever, weight gain, weight loss, chills  · Cardiovascular  o Denies  o : chest Pain, palpitations, edema (swelling)  · Respiratory  o Denies  o : frequent cough, shortness of breath  · Gastrointestinal  o Denies  o : nausea, vomiting, changes in bowel habits  · Genitourinary  o Denies  o : dysuria, urinary frequency, urinary urgency, polyuria  · Neurologic  o Admits  o : headache, tingling or numbness  o Denies  o : dizziness  · Musculoskeletal  o Admits  o : back pain  o Denies  o : joint pain, myalgias  · Psychiatric  o Denies  o : mood changes, memory changes, SI/HI  · Allergic-Immunologic  o Admits  o : seasonal allergies  o Denies  o : eczema, urticaria      Vitals  Date Time BP Position Site L\R Cuff Size HR RR TEMP (F) WT  HT  BMI kg/m2 BSA m2 O2 Sat FR L/min FiO2 HC       10/05/2020 11:43 /77 Sitting    73 - R 18 97.6 246lbs 0oz 5'  4\" 42.23 2.24 97 %      03/03/2021 07:10 /53 Sitting    78 - R 18 97.6 236lbs 0oz 5'  4\" 40.51 2.2 97 %      05/17/2021 03:38 /72 Sitting    83 - R 18 98.2 228lbs 0oz 5'  4\" 39.14 2.16 98 %            Physical Examination  · Constitutional  o Appearance  o : well-nourished, in no acute distress  · Eyes  o Conjunctivae  o : conjunctivae normal  o Sclerae  o : sclerae white  o Pupils and Irises  o : pupils equal and round  o Eyelids/Ocular Adnexae  o : eyelid appearance normal, no " exudates present  · Ears, Nose, Mouth and Throat  o Ears  o :   § External Ears  § : external auditory canal appearance within normal limits, no discharge present  § Otoscopic Examination  § : tympanic membrane appearance within normal limits bilaterally, cerumen not present  o Nose  o :   § External Nose  § : appearance normal  § Intranasal Exam  § : mucosa within normal limits, vestibules normal, no intranasal lesions present, septum midline, sinuses non tender to palpation  § Nasopharynx  § : no discharge present  o Oral Cavity  o :   § Oral Mucosa  § : oral mucosa normal  § Lips  § : lip appearance normal  § Teeth  § : normal dentation for age  · Neck  o Inspection/Palpation  o : normal appearance, no masses or tenderness, trachea midline  o Range of Motion  o : cervical range of motion within normal limits  o Thyroid  o : gland size normal, nontender, no nodules or masses present on palpation  · Respiratory  o Respiratory Effort  o : breathing unlabored  o Inspection of Chest  o : normal appearance  o Auscultation of Lungs  o : normal breath sounds throughout inspiration and expiration  · Cardiovascular  o Heart  o :   § Auscultation of Heart  § : regular rate and rhythm, no murmurs, gallops or rubs  o Peripheral Vascular System  o :   § Carotid Arteries  § : normal pulses bilaterally, no bruits present  § Extremities  § : no clubbing or edema  · Gastrointestinal  o Abdominal Examination  o : abdomen nontender to palpation, tone normal without rigidity or guarding, no masses present, bowel sounds present  · Musculoskeletal  o Spine  o :   § Inspection/Palpation  § : no spinal tenderness, scoliosis or kyphosis present  § Range of Motion  § : spine range of motion normal  o Right Upper Extremity  o :   § Inspection/Palpation  § : no tenderness to palpation, ROM normal  o Left Upper Extremity  o :   § Inspection/Palpation  § : no tenderness to palpation, ROM normal  o Right Lower Extremity  o :    § Inspection/Palpation  § : no joint or limb tenderness to palpation, ROM normal  o Left Lower Extremity  o :   § Inspection/Palpation  § : no joint or limb tenderness to palpation, ROM normal  · Skin and Subcutaneous Tissue  o General Inspection  o : no rashes or lesions present, no areas of discoloration  o Body Hair  o : hair normal for age, general body hair distribution normal for age  o Digits and Nails  o : no clubbing, cyanosis, deformities or edema present, normal appearing nails  · Neurologic  o Mental Status Examination  o :   § Orientation  § : grossly oriented to person, place and time  o Gait and Station  o : normal gait, able to stand without difficulty  · Psychiatric  o Judgement and Insight  o : judgment and insight intact  o Mood and Affect  o : mood normal, affect appropriate          Assessment  · Encounter for screening for cardiovascular disorders     V81.2/Z13.6  · Moderate Left Sciatic leg pain     724.3/M54.30  · Arthritis     716.90/M19.90  · Routine lab draw     V72.60/Z01.89      Plan  · Orders  o Physical, Primary Care Panel (CBC, CMP, Lipid, TSH) [facility] (59225, 49816, 28568, [address]22) - V72.60/Z01.89 - 05/17/2021  o INDIRA Report (KASPR) - - 05/17/2021  o ACO-14: Influenza immunization was not administered for reasons documented [facility] () - - 05/17/2021  o ACO-19: Colorectal cancer screening results documented and reviewed (3017F) - - 05/17/2021  o ACO-39: Current medications updated and reviewed (1159F, ) - - 05/17/2021  · Medications  o gabapentin 300 mg oral capsule   SIG: take 1 capsule by oral route once a day (at bedtime) for 90 days   DISP: (90) Capsule with 1 refills  Adjusted on 05/17/2021     o Singulair 10 mg oral tablet   SIG: take 1 tablet (10 mg) by oral route once daily in the evening for 90 days   DISP: (90) Tablet with 3 refills  Adjusted on 05/17/2021     o fluticasone propionate 50 mcg/actuation nasal spray,suspension   SIG: PLACE TWO SPRAYS IN EACH NOSTRIL ONCE  DAILY AS NEEDED   DISP: (1) Bottle with 5 refills  Refilled on 05/17/2021     o loratadine 10 mg oral tablet   SIG: TAKE ONE TABLET BY MOUTH DAILY for 30 days   DISP: (90) Tablet with 3 refills  Refilled on 05/17/2021     o cyclobenzaprine 10 mg oral tablet   SIG: take 1 tablet (10 mg) by oral route PRN   DISP: (0) tablet with 0 refills  Discontinued on 05/17/2021     o methocarbamol 750 mg oral tablet   SIG: take 1-2 tablets by oral route 3 times a day for 5 days   DISP: (30) Tablet with 0 refills  Discontinued on 05/17/2021     o Medications have been Reconciled  o Transition of Care or Provider Policy  · Instructions  o Obtained a written consent for INDIRA query. Discussed the risk and benefits of the use of controlled substances with the patient, including the risk of tolerance and drug dependence. The patient has been counseled on the need to have an exit strategy, including potentially discontinuing the use of controlled substances. INDIRA has or will be reviewed as soon as it becomes avaliable.  o Patient was educated/instructed on their diagnosis, treatment and medications prior to discharge from the clinic today.  o Call the office with any concerns or questions.  · Disposition  o Return to Office in 6 months.            Electronically Signed by: ALYSSA Lowe -Author on May 17, 2021 04:11:53 PM

## 2021-06-25 ENCOUNTER — OFFICE VISIT (OUTPATIENT)
Dept: ORTHOPEDIC SURGERY | Facility: CLINIC | Age: 51
End: 2021-06-25

## 2021-06-25 VITALS — BODY MASS INDEX: 39.81 KG/M2 | HEIGHT: 64 IN | OXYGEN SATURATION: 97 % | HEART RATE: 71 BPM | WEIGHT: 233.2 LBS

## 2021-06-25 DIAGNOSIS — Z96.652 S/P TKR (TOTAL KNEE REPLACEMENT), LEFT: Primary | ICD-10-CM

## 2021-06-25 PROCEDURE — 99213 OFFICE O/P EST LOW 20 MIN: CPT | Performed by: ORTHOPAEDIC SURGERY

## 2021-06-25 NOTE — PROGRESS NOTES
"Chief Complaint  Follow-up of the Left Knee     Subjective      Slime Mcelroy presents to [facility] for a follow-up of left knee. Patient is s/p left TKA performed 1/11/2021 by Dr. Cerda. She states she is happy with the results of her left TKA. She states she has no complaints with her left knee today. She states she has been able to fully return to activities of daily living.     Allergies   Allergen Reactions   • Penicillins Hives   • Amoxicillin Hives   • Cephalosporins Hives   • Codeine Hives   • Diphenhydramine Hives   • Hydrocodone-Acetaminophen Hives        Social History     Socioeconomic History   • Marital status:      Spouse name: Not on file   • Number of children: Not on file   • Years of education: Not on file   • Highest education level: Not on file   Tobacco Use   • Smoking status: Current Every Day Smoker     Packs/day: 1.00     Types: Cigarettes   • Smokeless tobacco: Current User   Vaping Use   • Vaping Use: Never used   Substance and Sexual Activity   • Alcohol use: Never   • Drug use: Never        Review of Systems     Objective   Vital Signs:   Pulse 71   Ht 162.6 cm (64\")   Wt 106 kg (233 lb 3.2 oz)   SpO2 97%   BMI 40.03 kg/m²       Physical Exam  Constitutional:       Appearance: Normal appearance. He is well-developed and normal weight.   HENT:      Head: Normocephalic.      Right Ear: Hearing and external ear normal.      Left Ear: Hearing and external ear normal.      Nose: Nose normal.   Eyes:      Conjunctiva/sclera: Conjunctivae normal.   Cardiovascular:      Rate and Rhythm: Normal rate.   Pulmonary:      Effort: Pulmonary effort is normal.      Breath sounds: No wheezing or rales.   Abdominal:      Palpations: Abdomen is soft.      Tenderness: There is no abdominal tenderness.   Musculoskeletal:      Cervical back: Normal range of motion.   Skin:     Findings: No rash.   Neurological:      Mental Status: He is alert and oriented to " person, place, and time.   Psychiatric:         Mood and Affect: Mood and affect normal.         Judgment: Judgment normal.       Ortho Exam      LEFT KNEE: Incisions well healed. No signs of infection. Calf supple, non-tender, no signs of DVT. Full weight bearing. Non-antalgic gait. Patella well tracking. Stable to varus/valgus stress. Sensation grossly intact. Neurovascular intact. Good strength to hamstrings, quadriceps, dorsiflexors and plantar flexors. Full extension. Flexion to 130 degrees. Dorsal Pedal Pulse 2+, posteriror tibialis pulse 2+. Non-tender medial and lateral joint line.       Procedures      Imaging Results (Most Recent)     Procedure Component Value Units Date/Time    XR Knee 3 View Left [[id number]] Resulted: 06/25/21 1049     Updated: 06/25/21 1049    Narrative:      X-Ray Report:  Left knee(s) X-Ray  Indication: Evaluation of left knee  AP, Lateral and Sunrise view(s)  Findings: Demonstrates an intact left total knee arthroplasty with   appropriate alignment and no signs of loosening.   Prior studies available for comparison: yes            Result Review :     X-Ray Report:  Left knee(s) X-Ray  Indication: Evaluation of left knee  AP, Lateral and Sunrise view(s)  Findings: Demonstrates an intact left total knee arthroplasty with appropriate alignment and no signs of loosening.   Prior studies available for comparison: yes          Assessment and Plan     DX: Left total knee arthroplasty     Discussed treatment plans with the patient. She will continue her at home exercises. Progress back into activities as tolerated.     Call or return if worsening symptoms.    Follow Up     1 Year.       Patient was given instructions and counseling regarding her condition or for health maintenance advice. Please see specific information pulled into the AVS if appropriate.     Scribed for Hannah Cerda MD by Maria M Dent.  06/25/21   08:45 EDT    I have personally performed the services described in this  document as scribed by the above individual and it is both accurate and complete.  Hannah Cerda MD 06/25/21  08:45 EDT

## 2021-07-15 VITALS
HEIGHT: 64 IN | SYSTOLIC BLOOD PRESSURE: 127 MMHG | RESPIRATION RATE: 18 BRPM | BODY MASS INDEX: 38.93 KG/M2 | WEIGHT: 228 LBS | DIASTOLIC BLOOD PRESSURE: 72 MMHG | TEMPERATURE: 98.2 F | HEART RATE: 83 BPM | OXYGEN SATURATION: 98 %

## 2021-08-16 ENCOUNTER — HOSPITAL ENCOUNTER (OUTPATIENT)
Dept: MAMMOGRAPHY | Facility: HOSPITAL | Age: 51
Discharge: HOME | End: 2021-08-16
Admitting: NURSE PRACTITIONER

## 2021-08-16 DIAGNOSIS — Z92.89 HISTORY OF MAMMOGRAPHY, SCREENING: ICD-10-CM

## 2021-08-16 PROCEDURE — 77067 SCR MAMMO BI INCL CAD: CPT | Performed by: RADIOLOGY

## 2021-08-16 PROCEDURE — 77063 BREAST TOMOSYNTHESIS BI: CPT | Performed by: RADIOLOGY

## 2021-08-16 PROCEDURE — 77067 SCR MAMMO BI INCL CAD: CPT

## 2021-08-16 PROCEDURE — 77063 BREAST TOMOSYNTHESIS BI: CPT

## 2021-09-30 ENCOUNTER — OFFICE VISIT (OUTPATIENT)
Dept: ORTHOPEDIC SURGERY | Facility: CLINIC | Age: 51
End: 2021-09-30

## 2021-09-30 VITALS — HEIGHT: 64 IN | BODY MASS INDEX: 39.78 KG/M2 | WEIGHT: 233 LBS

## 2021-09-30 DIAGNOSIS — M17.11 PRIMARY OSTEOARTHRITIS OF RIGHT KNEE: Primary | ICD-10-CM

## 2021-09-30 DIAGNOSIS — M25.561 RIGHT KNEE PAIN, UNSPECIFIED CHRONICITY: ICD-10-CM

## 2021-09-30 PROCEDURE — 99212 OFFICE O/P EST SF 10 MIN: CPT | Performed by: ORTHOPAEDIC SURGERY

## 2021-11-17 ENCOUNTER — OFFICE VISIT (OUTPATIENT)
Dept: FAMILY MEDICINE CLINIC | Facility: CLINIC | Age: 51
End: 2021-11-17

## 2021-11-17 VITALS
TEMPERATURE: 98.8 F | DIASTOLIC BLOOD PRESSURE: 44 MMHG | OXYGEN SATURATION: 97 % | HEART RATE: 83 BPM | HEIGHT: 64 IN | WEIGHT: 229 LBS | SYSTOLIC BLOOD PRESSURE: 129 MMHG | BODY MASS INDEX: 39.09 KG/M2

## 2021-11-17 DIAGNOSIS — Z13.29 SCREENING FOR THYROID DISORDER: ICD-10-CM

## 2021-11-17 DIAGNOSIS — M54.32 SCIATICA OF LEFT SIDE: ICD-10-CM

## 2021-11-17 DIAGNOSIS — E78.5 HYPERLIPIDEMIA, UNSPECIFIED HYPERLIPIDEMIA TYPE: Primary | ICD-10-CM

## 2021-11-17 DIAGNOSIS — J30.2 SEASONAL ALLERGIES: ICD-10-CM

## 2021-11-17 DIAGNOSIS — Z51.81 ENCOUNTER FOR MEDICATION MONITORING: ICD-10-CM

## 2021-11-17 DIAGNOSIS — Z01.89 ROUTINE LAB DRAW: ICD-10-CM

## 2021-11-17 DIAGNOSIS — Z23 NEED FOR SHINGLES VACCINE: ICD-10-CM

## 2021-11-17 PROCEDURE — 99214 OFFICE O/P EST MOD 30 MIN: CPT | Performed by: NURSE PRACTITIONER

## 2021-11-17 PROCEDURE — 80305 DRUG TEST PRSMV DIR OPT OBS: CPT | Performed by: NURSE PRACTITIONER

## 2021-11-17 RX ORDER — FLUTICASONE PROPIONATE 50 MCG
2 SPRAY, SUSPENSION (ML) NASAL DAILY
Qty: 16 G | Refills: 5 | Status: SHIPPED | OUTPATIENT
Start: 2021-11-17 | End: 2023-02-13

## 2021-11-17 RX ORDER — GABAPENTIN 300 MG/1
300 CAPSULE ORAL 2 TIMES DAILY
Qty: 180 CAPSULE | Refills: 0 | Status: SHIPPED | OUTPATIENT
Start: 2021-11-17 | End: 2022-02-14

## 2021-11-17 RX ORDER — MONTELUKAST SODIUM 10 MG/1
10 TABLET ORAL NIGHTLY
Qty: 90 TABLET | Refills: 1 | Status: SHIPPED | OUTPATIENT
Start: 2021-11-17 | End: 2022-07-21

## 2021-11-17 NOTE — PROGRESS NOTES
Chief Complaint  Follow-up, Hyperlipidemia, Arthritis, Earache, and Sore Throat    Subjective          Slime Mcelroy is a 51 y.o. female who presents to [facility]    History of Present Illness    Pt has sick contacts in the home with otitis. Pt reports ongoing for 3 days. Pt reports compliance with allergy medication.    Hyperlipidemia - pt has been working on diet for cholesterol.     Allergies - compliant with meds.    Left sciatic nerve - pt reports having relief when taking gabapentin.     PHQ-2 Total Score: 0   PHQ-9 Total Score: 0       Review of Systems   Constitutional: Negative for chills, fatigue and fever.   HENT: Positive for ear pain, postnasal drip and rhinorrhea.    Respiratory: Negative for cough and shortness of breath.    Cardiovascular: Negative for chest pain and palpitations.   Gastrointestinal: Negative for constipation, diarrhea, nausea and vomiting.   Musculoskeletal: Positive for arthralgias and back pain. Negative for neck pain.   Skin: Negative for rash.   Allergic/Immunologic: Positive for environmental allergies.   Neurological: Positive for numbness. Negative for dizziness and headaches.          Medical History: has a past medical history of Allergic rhinitis (05/14/2015), Ankle pain, left, Arthralgia, Arthritis, Arthritis, rheumatoid (HCC), Arthropathy, Athlete's foot, Broken bones, Corns and callus, Fatigue, Heel pain (2000), Hemorrhoids, Hyperlipidemia, Ingrowing toenail (2014), Kidney calculus, Limb swelling, Plantar wart (1984), Primary osteoarthritis of left knee (05/01/2018), Renal calculus, and Seasonal allergies.     Surgical History: has a past surgical history that includes Colonoscopy (08/07/2019); Dilation and curettage of uterus; Dilation and curettage of uterus; Dilation and curettage of uterus; Elbow surgery; Hand surgery (Left); Other surgical history; Other surgical history; Tendon repair; Finger surgery; and Toe Nail Amputation  "(2014).     Family History: family history includes Arthritis in her brother, father, mother, sister, and son; Diabetes type II in her brother, sister, and another family member; Heart disease in an other family member; Osteoporosis in her mother; Stroke in an other family member.     Social History: reports that she has been smoking cigarettes. She has been smoking about 1.00 pack per day. She uses smokeless tobacco. She reports that she does not drink alcohol and does not use drugs.    Allergies: Penicillins, Amoxicillin, Cephalosporins, Codeine, Diphenhydramine, and Hydrocodone-acetaminophen      Health Maintenance Due   Topic Date Due   • COVID-19 Vaccine (1) Never done   • ZOSTER VACCINE (1 of 2) Never done   • HEPATITIS C SCREENING  Never done   • ANNUAL WELLNESS VISIT  Never done   • PAP SMEAR  06/12/2021   • LIPID PANEL  06/25/2021            Current Outpatient Medications:   •  diclofenac (VOLTAREN) 50 MG EC tablet, Take 1 tablet by mouth 2 (Two) Times a Day As Needed (pain)., Disp: 20 tablet, Rfl: 0  •  fexofenadine (ALLEGRA) 180 MG tablet, Take 180 mg by mouth Daily., Disp: , Rfl:   •  fluticasone (FLONASE) 50 MCG/ACT nasal spray, 2 sprays into the nostril(s) as directed by provider Daily., Disp: 16 g, Rfl: 5  •  gabapentin (NEURONTIN) 300 MG capsule, Take 1 capsule by mouth 2 (Two) Times a Day., Disp: 180 capsule, Rfl: 0  •  montelukast (SINGULAIR) 10 MG tablet, Take 1 tablet by mouth Every Night., Disp: 90 tablet, Rfl: 1        There is no immunization history on file for this patient.      Objective       Vitals:    11/17/21 0739   BP: 129/44   BP Location: Right arm   Patient Position: Sitting   Cuff Size: Adult   Pulse: 83   Temp: 98.8 °F (37.1 °C)   TempSrc: Temporal   SpO2: 97%   Weight: 104 kg (229 lb)   Height: 162.6 cm (64.02\")      Body mass index is 39.29 kg/m².   Wt Readings from Last 3 Encounters:   11/17/21 104 kg (229 lb)   09/30/21 106 kg (233 lb)   06/25/21 106 kg (233 lb 3.2 oz)      BP " Readings from Last 3 Encounters:   11/17/21 129/44   06/22/21 124/69   05/17/21 127/72        Physical Exam  Vitals reviewed.   Constitutional:       Appearance: Normal appearance. She is well-developed.   HENT:      Head: Normocephalic and atraumatic.      Right Ear: A middle ear effusion is present.      Ears:      Comments: Scant amount clear fluid  Eyes:      Conjunctiva/sclera: Conjunctivae normal.      Pupils: Pupils are equal, round, and reactive to light.   Cardiovascular:      Rate and Rhythm: Normal rate and regular rhythm.      Heart sounds: Normal heart sounds. No murmur heard.      Pulmonary:      Effort: Pulmonary effort is normal.      Breath sounds: Normal breath sounds. No wheezing or rhonchi.   Abdominal:      General: Bowel sounds are normal. There is no distension.      Palpations: Abdomen is soft.      Tenderness: There is no abdominal tenderness.   Skin:     General: Skin is warm and dry.   Neurological:      Mental Status: She is alert and oriented to person, place, and time.   Psychiatric:         Mood and Affect: Mood and affect normal.         Behavior: Behavior normal.         Thought Content: Thought content normal.         Judgment: Judgment normal.             Result Review :     Common labs    Common Labsle 12/28/20 12/28/20 12/28/20 1/12/21 1/12/21 5/17/21    1115 1115 1115 0422 0422    Glucose   98  112 (A) 92   BUN   14  11 10   Creatinine   0.73  0.70 0.78   Sodium   139  134 (A) 140   Potassium   4.3  4.4 4.1   Chloride   105  103 103   Calcium   9.6  8.5 (A) 9.4   Albumin   4.2   4.2   Total Bilirubin   0.39   0.21   Alkaline Phosphatase   104   112   AST (SGOT)   16   15   ALT (SGPT)   10   8 (A)   WBC 8.14     8.30   Hemoglobin 14.9   11.3 (A)  13.9   Hematocrit 43.5   34.2 (A)  43.1   Platelets 293     321   Total Cholesterol      166   Triglycerides      158 (A)   HDL Cholesterol      36 (A)   LDL Cholesterol       98   Hemoglobin A1C  5.3       (A) Abnormal value        Comments are available for some flowsheets but are not being displayed.                        Assessment and Plan        Diagnoses and all orders for this visit:    1. Hyperlipidemia, unspecified hyperlipidemia type (Primary)  -     Comprehensive metabolic panel  -     Lipid panel  -     CBC (No Diff)    2. Routine lab draw  -     Comprehensive metabolic panel  -     Lipid panel  -     CBC (No Diff)    3. Encounter for medication monitoring  -     POC Urine Drug Screen [facility]    4. Sciatica of left side  -     gabapentin (NEURONTIN) 300 MG capsule; Take 1 capsule by mouth 2 (Two) Times a Day.  Dispense: 180 capsule; Refill: 0    5. Need for shingles vaccine  -     Zoster Vac Recomb Adjuvanted 50 MCG/0.5ML reconstituted suspension; Inject 0.5 mL into the appropriate muscle as directed by prescriber 1 (One) Time for 1 dose. Repeat in 1-5 months for final dose.  Dispense: 1 each; Refill: 1    6. Screening for thyroid disorder  -     T4, free  -     TSH    7. Seasonal allergies  -     fluticasone (FLONASE) 50 MCG/ACT nasal spray; 2 sprays into the nostril(s) as directed by provider Daily.  Dispense: 16 g; Refill: 5  -     montelukast (SINGULAIR) 10 MG tablet; Take 1 tablet by mouth Every Night.  Dispense: 90 tablet; Refill: 1          Follow Up     Return in about 1 year (around 11/17/2022) for Next scheduled follow up.    Patient was given instructions and counseling regarding her condition or for health maintenance advice. Please see specific information pulled into the AVS if appropriate.     ALYSSA Lowe

## 2021-11-29 ENCOUNTER — OFFICE VISIT (OUTPATIENT)
Dept: FAMILY MEDICINE CLINIC | Facility: CLINIC | Age: 51
End: 2021-11-29

## 2021-11-29 VITALS
BODY MASS INDEX: 39.27 KG/M2 | HEART RATE: 77 BPM | OXYGEN SATURATION: 96 % | WEIGHT: 230 LBS | TEMPERATURE: 98.9 F | DIASTOLIC BLOOD PRESSURE: 67 MMHG | SYSTOLIC BLOOD PRESSURE: 137 MMHG | HEIGHT: 64 IN

## 2021-11-29 DIAGNOSIS — Z00.00 MEDICARE ANNUAL WELLNESS VISIT, SUBSEQUENT: Primary | ICD-10-CM

## 2021-11-29 DIAGNOSIS — E66.09 CLASS 2 OBESITY DUE TO EXCESS CALORIES WITHOUT SERIOUS COMORBIDITY WITH BODY MASS INDEX (BMI) OF 39.0 TO 39.9 IN ADULT: ICD-10-CM

## 2021-11-29 LAB
ALBUMIN SERPL-MCNC: 4.4 G/DL (ref 3.5–5.2)
ALBUMIN/GLOB SERPL: 1.3 G/DL
ALP SERPL-CCNC: 108 U/L (ref 39–117)
ALT SERPL W P-5'-P-CCNC: 13 U/L (ref 1–33)
ANION GAP SERPL CALCULATED.3IONS-SCNC: 8.2 MMOL/L (ref 5–15)
AST SERPL-CCNC: 17 U/L (ref 1–32)
BILIRUB SERPL-MCNC: 0.3 MG/DL (ref 0–1.2)
BUN SERPL-MCNC: 14 MG/DL (ref 6–20)
BUN/CREAT SERPL: 19.2 (ref 7–25)
CALCIUM SPEC-SCNC: 9.6 MG/DL (ref 8.6–10.5)
CHLORIDE SERPL-SCNC: 101 MMOL/L (ref 98–107)
CHOLEST SERPL-MCNC: 156 MG/DL (ref 0–200)
CO2 SERPL-SCNC: 27.8 MMOL/L (ref 22–29)
CREAT SERPL-MCNC: 0.73 MG/DL (ref 0.57–1)
DEPRECATED RDW RBC AUTO: 40 FL (ref 37–54)
ERYTHROCYTE [DISTWIDTH] IN BLOOD BY AUTOMATED COUNT: 12.2 % (ref 12.3–15.4)
GFR SERPL CREATININE-BSD FRML MDRD: 83 ML/MIN/1.73
GLOBULIN UR ELPH-MCNC: 3.3 GM/DL
GLUCOSE SERPL-MCNC: 86 MG/DL (ref 65–99)
HCT VFR BLD AUTO: 41.3 % (ref 34–46.6)
HDLC SERPL-MCNC: 34 MG/DL (ref 40–60)
HGB BLD-MCNC: 14.1 G/DL (ref 12–15.9)
LDLC SERPL CALC-MCNC: 96 MG/DL (ref 0–100)
LDLC/HDLC SERPL: 2.74 {RATIO}
MCH RBC QN AUTO: 30.7 PG (ref 26.6–33)
MCHC RBC AUTO-ENTMCNC: 34.1 G/DL (ref 31.5–35.7)
MCV RBC AUTO: 90 FL (ref 79–97)
PLATELET # BLD AUTO: 275 10*3/MM3 (ref 140–450)
PMV BLD AUTO: 10.3 FL (ref 6–12)
POTASSIUM SERPL-SCNC: 4.4 MMOL/L (ref 3.5–5.2)
PROT SERPL-MCNC: 7.7 G/DL (ref 6–8.5)
RBC # BLD AUTO: 4.59 10*6/MM3 (ref 3.77–5.28)
SODIUM SERPL-SCNC: 137 MMOL/L (ref 136–145)
T4 FREE SERPL-MCNC: 0.89 NG/DL (ref 0.93–1.7)
TRIGL SERPL-MCNC: 144 MG/DL (ref 0–150)
TSH SERPL DL<=0.05 MIU/L-ACNC: 2.86 UIU/ML (ref 0.27–4.2)
VLDLC SERPL-MCNC: 26 MG/DL (ref 5–40)
WBC NRBC COR # BLD: 8.2 10*3/MM3 (ref 3.4–10.8)

## 2021-11-29 PROCEDURE — 36415 COLL VENOUS BLD VENIPUNCTURE: CPT | Performed by: NURSE PRACTITIONER

## 2021-11-29 PROCEDURE — 85027 COMPLETE CBC AUTOMATED: CPT | Performed by: NURSE PRACTITIONER

## 2021-11-29 PROCEDURE — 84443 ASSAY THYROID STIM HORMONE: CPT | Performed by: NURSE PRACTITIONER

## 2021-11-29 PROCEDURE — 80053 COMPREHEN METABOLIC PANEL: CPT | Performed by: NURSE PRACTITIONER

## 2021-11-29 PROCEDURE — 1160F RVW MEDS BY RX/DR IN RCRD: CPT | Performed by: NURSE PRACTITIONER

## 2021-11-29 PROCEDURE — 80061 LIPID PANEL: CPT | Performed by: NURSE PRACTITIONER

## 2021-11-29 PROCEDURE — G0439 PPPS, SUBSEQ VISIT: HCPCS | Performed by: NURSE PRACTITIONER

## 2021-11-29 PROCEDURE — 1125F AMNT PAIN NOTED PAIN PRSNT: CPT | Performed by: NURSE PRACTITIONER

## 2021-11-29 PROCEDURE — 84439 ASSAY OF FREE THYROXINE: CPT | Performed by: NURSE PRACTITIONER

## 2021-11-29 NOTE — PATIENT INSTRUCTIONS
"https://www.diabeteseducator.org/docs/default-source/living-with-diabetes/conquering-the-grocery-store-v1.pdf?sfvrsn=4\">   Carbohydrate Counting for Diabetes Mellitus, Adult  Carbohydrate counting is a method of keeping track of how many carbohydrates you eat. Eating carbohydrates naturally increases the amount of sugar (glucose) in the blood. Counting how many carbohydrates you eat improves your blood glucose control, which helps you manage your diabetes.  It is important to know how many carbohydrates you can safely have in each meal. This is different for every person. A dietitian can help you make a meal plan and calculate how many carbohydrates you should have at each meal and snack.  What foods contain carbohydrates?  Carbohydrates are found in the following foods:  · Grains, such as breads and cereals.  · Dried beans and soy products.  · Starchy vegetables, such as potatoes, peas, and corn.  · Fruit and fruit juices.  · Milk and yogurt.  · Sweets and snack foods, such as cake, cookies, candy, chips, and soft drinks.  How do I count carbohydrates in foods?  There are two ways to count carbohydrates in food. You can read food labels or learn standard serving sizes of foods. You can use either of the methods or a combination of both.  Using the Nutrition Facts label  The Nutrition Facts list is included on the labels of almost all packaged foods and beverages in the [place] It includes:  · The serving size.  · Information about nutrients in each serving, including the grams (g) of carbohydrate per serving.  To use the Nutrition Facts:  · Decide how many servings you will have.  · Multiply the number of servings by the number of carbohydrates per serving.  · The resulting number is the total amount of carbohydrates that you will be having.  Learning the standard serving sizes of foods  When you eat carbohydrate foods that are not packaged or do not include Nutrition Facts on the label, you need to measure the " servings in order to count the amount of carbohydrates.  · Measure the foods that you will eat with a food scale or measuring cup, if needed.  · Decide how many standard-size servings you will eat.  · Multiply the number of servings by 15. For foods that contain carbohydrates, one serving equals 15 g of carbohydrates.  ? For example, if you eat 2 cups or 10 oz (300 g) of strawberries, you will have eaten 2 servings and 30 g of carbohydrates (2 servings x 15 g = 30 g).  · For foods that have more than one food mixed, such as soups and casseroles, you must count the carbohydrates in each food that is included.  The following list contains standard serving sizes of common carbohydrate-rich foods. Each of these servings has about 15 g of carbohydrates:  · 1 slice of bread.  · 1 six-inch (15 cm) tortilla.  · ? cup or 2 oz (53 g) cooked rice or pasta.  · ½ cup or 3 oz (85 g) cooked or canned, drained and rinsed beans or lentils.  · ½ cup or 3 oz (85 g) starchy vegetable, such as peas, corn, or squash.  · ½ cup or 4 oz (120 g) hot cereal.  · ½ cup or 3 oz (85 g) boiled or mashed potatoes, or ¼ or 3 oz (85 g) of a large baked potato.  · ½ cup or 4 fl oz (118 mL) fruit juice.  · 1 cup or 8 fl oz (237 mL) milk.  · 1 small or 4 oz (106 g) apple.  · ½ or 2 oz (63 g) of a medium banana.  · 1 cup or 5 oz (150 g) strawberries.  · 3 cups or 1 oz (24 g) popped popcorn.  What is an example of carbohydrate counting?  To calculate the number of carbohydrates in this sample meal, follow the steps shown below.  Sample meal  · 3 oz (85 g) chicken breast.  · ? cup or 4 oz (106 g) brown rice.  · ½ cup or 3 oz (85 g) corn.  · 1 cup or 8 fl oz (237 mL) milk.  · 1 cup or 5 oz (150 g) strawberries with sugar-free whipped topping.  Carbohydrate calculation  1. Identify the foods that contain carbohydrates:  ? Rice.  ? Corn.  ? Milk.  ? Strawberries.  2. Calculate how many servings you have of each food:  ? 2 servings rice.  ? 1 serving  corn.  ? 1 serving milk.  ? 1 serving strawberries.  3. Multiply each number of servings by 15 g:  ? 2 servings rice x 15 g = 30 g.  ? 1 serving corn x 15 g = 15 g.  ? 1 serving milk x 15 g = 15 g.  ? 1 serving strawberries x 15 g = 15 g.  4. Add together all of the amounts to find the total grams of carbohydrates eaten:  ? 30 g + 15 g + 15 g + 15 g = 75 g of carbohydrates total.  What are tips for following this plan?  Shopping  · Develop a meal plan and then make a shopping list.  · Buy fresh and frozen vegetables, fresh and frozen fruit, dairy, eggs, beans, lentils, and whole grains.  · Look at food labels. Choose foods that have more fiber and less sugar.  · Avoid processed foods and foods with added sugars.  Meal planning  · Aim to have the same amount of carbohydrates at each meal and for each snack time.  · Plan to have regular, balanced meals and snacks.  Where to find more information  · American Diabetes Association: www.diabetes.org  · Centers for Disease Control and Prevention: www.cdc.gov  Summary  · Carbohydrate counting is a method of keeping track of how many carbohydrates you eat.  · Eating carbohydrates naturally increases the amount of sugar (glucose) in the blood.  · Counting how many carbohydrates you eat improves your blood glucose control, which helps you manage your diabetes.  · A dietitian can help you make a meal plan and calculate how many carbohydrates you should have at each meal and snack.  This information is not intended to replace advice given to you by your health care provider. Make sure you discuss any questions you have with your health care provider.  Document Revised: 12/17/2020 Document Reviewed: 12/18/2020  [employer] Patient Education © 2021 [employer]      Preventive Care 40-64 Years Old, Female  Preventive care refers to visits with your health care provider and lifestyle choices that can promote health and wellness. This includes:  · A yearly physical exam. This may also  be called an annual well check.  · Regular dental visits and eye exams.  · Immunizations.  · Screening for certain conditions.  · Healthy lifestyle choices, such as eating a healthy diet, getting regular exercise, not using drugs or products that contain nicotine and tobacco, and limiting alcohol use.  What can I expect for my preventive care visit?  Physical exam  Your health care provider will check your:  · Height and weight. This may be used to calculate body mass index (BMI), which tells if you are at a healthy weight.  · Heart rate and blood pressure.  · Skin for abnormal spots.  Counseling  Your health care provider may ask you questions about your:  · Alcohol, tobacco, and drug use.  · Emotional well-being.  · Home and relationship well-being.  · Sexual activity.  · Eating habits.  · Work and work environment.  · Method of birth control.  · Menstrual cycle.  · Pregnancy history.  What immunizations do I need?    Influenza (flu) vaccine  · This is recommended every year.  Tetanus, diphtheria, and pertussis (Tdap) vaccine  · You may need a Td booster every 10 years.  Varicella (chickenpox) vaccine  · You may need this if you have not been vaccinated.  Zoster (shingles) vaccine  · You may need this after age 60.  Measles, mumps, and rubella (MMR) vaccine  · You may need at least one dose of MMR if you were born in 1957 or later. You may also need a second dose.  Pneumococcal conjugate (PCV13) vaccine  · You may need this if you have certain conditions and were not previously vaccinated.  Pneumococcal polysaccharide (PPSV23) vaccine  · You may need one or two doses if you smoke cigarettes or if you have certain conditions.  Meningococcal conjugate (MenACWY) vaccine  · You may need this if you have certain conditions.  Hepatitis A vaccine  · You may need this if you have certain conditions or if you travel or work in places where you may be exposed to hepatitis A.  Hepatitis B vaccine  · You may need this if you  have certain conditions or if you travel or work in places where you may be exposed to hepatitis B.  Haemophilus influenzae type b (Hib) vaccine  · You may need this if you have certain conditions.  Human papillomavirus (HPV) vaccine  · If recommended by your health care provider, you may need three doses over 6 months.  You may receive vaccines as individual doses or as more than one vaccine together in one shot (combination vaccines). Talk with your health care provider about the risks and benefits of combination vaccines.  What tests do I need?  Blood tests  · Lipid and cholesterol levels. These may be checked every 5 years, or more frequently if you are over 50 years old.  · Hepatitis C test.  · Hepatitis B test.  Screening  · Lung cancer screening. You may have this screening every year starting at age 55 if you have a 30-pack-year history of smoking and currently smoke or have quit within the past 15 years.  · Colorectal cancer screening. All adults should have this screening starting at age 50 and continuing until age 75. Your health care provider may recommend screening at age 45 if you are at increased risk. You will have tests every 1-10 years, depending on your results and the type of screening test.  · Diabetes screening. This is done by checking your blood sugar (glucose) after you have not eaten for a while (fasting). You may have this done every 1-3 years.  · Mammogram. This may be done every 1-2 years. Talk with your health care provider about when you should start having regular mammograms. This may depend on whether you have a family history of breast cancer.  · BRCA-related cancer screening. This may be done if you have a family history of breast, ovarian, tubal, or peritoneal cancers.  · Pelvic exam and Pap test. This may be done every 3 years starting at age 21. Starting at age 30, this may be done every 5 years if you have a Pap test in combination with an HPV test.  Other tests  · Sexually  transmitted disease (STD) testing.  · Bone density scan. This is done to screen for osteoporosis. You may have this scan if you are at high risk for osteoporosis.  Follow these instructions at home:  Eating and drinking  · Eat a diet that includes fresh fruits and vegetables, whole grains, lean protein, and low-fat dairy.  · Take vitamin and mineral supplements as recommended by your health care provider.  · Do not drink alcohol if:  ? Your health care provider tells you not to drink.  ? You are pregnant, may be pregnant, or are planning to become pregnant.  · If you drink alcohol:  ? Limit how much you have to 0-1 drink a day.  ? Be aware of how much alcohol is in your drink. In the [place] one drink equals one 12 oz bottle of beer (355 mL), one 5 oz glass of wine (148 mL), or one 1½ oz glass of hard liquor (44 mL).  Lifestyle  · Take daily care of your teeth and gums.  · Stay active. Exercise for at least 30 minutes on 5 or more days each week.  · Do not use any products that contain nicotine or tobacco, such as cigarettes, e-cigarettes, and chewing tobacco. If you need help quitting, ask your health care provider.  · If you are sexually active, practice safe sex. Use a condom or other form of birth control (contraception) in order to prevent pregnancy and STIs (sexually transmitted infections).  · If told by your health care provider, take low-dose aspirin daily starting at age 50.  What's next?  · Visit your health care provider once a year for a well check visit.  · Ask your health care provider how often you should have your eyes and teeth checked.  · Stay up to date on all vaccines.  This information is not intended to replace advice given to you by your health care provider. Make sure you discuss any questions you have with your health care provider.  Document Revised: 08/29/2019 Document Reviewed: 08/29/2019  [employer] Patient Education © 2020 [employer]

## 2021-11-29 NOTE — PROGRESS NOTES
The ABCs of the Annual Wellness Visit  Subsequent Medicare Wellness Visit    Chief Complaint   Patient presents with   • Medicare Wellness-subsequent      Subjective    History of Present Illness:  Slime Mcelroy is a 51 y.o. female who presents for a Subsequent Medicare Wellness Visit.    The following portions of the patient's history were reviewed and   updated as appropriate: allergies, current medications, past family history, past medical history, past social history, past surgical history and problem list.    Compared to one year ago, the patient feels her physical   health is better.    Compared to one year ago, the patient feels her mental   health is better.    Recent Hospitalizations:  She was not admitted to the hospital during the last year.       Current Medical Providers:  Patient Care Team:  Chacha Hurst APRN as PCP - General (Nurse Practitioner)    Outpatient Medications Prior to Visit   Medication Sig Dispense Refill   • diclofenac (VOLTAREN) 50 MG EC tablet Take 1 tablet by mouth 2 (Two) Times a Day As Needed (pain). 20 tablet 0   • fexofenadine (ALLEGRA) 180 MG tablet Take 180 mg by mouth Daily.     • fluticasone (FLONASE) 50 MCG/ACT nasal spray 2 sprays into the nostril(s) as directed by provider Daily. 16 g 5   • gabapentin (NEURONTIN) 300 MG capsule Take 1 capsule by mouth 2 (Two) Times a Day. 180 capsule 0   • montelukast (SINGULAIR) 10 MG tablet Take 1 tablet by mouth Every Night. 90 tablet 1     No facility-administered medications prior to visit.       No opioid medication identified on active medication list. I have reviewed chart for other potential  high risk medication/s and harmful drug interactions in the elderly.          Aspirin is not on active medication list.  Aspirin use is not indicated based on review of current medical condition/s. Risk of harm outweighs potential benefits.  .    Patient Active Problem List   Diagnosis   • Hyperlipidemia   • Sciatica of left side   •  "Seasonal allergies     Advance Care Planning  Advance Directive is on file.  ACP discussion was held with the patient during this visit. Patient has an advance directive in EMR which is still valid.     Review of Systems   Constitutional: Negative for fever.   Respiratory: Negative for cough, chest tightness and shortness of breath.    Cardiovascular: Negative for chest pain.   Musculoskeletal: Positive for arthralgias (knee right).   Psychiatric/Behavioral: Negative for dysphoric mood and suicidal ideas. The patient is not nervous/anxious.         Objective    Vitals:    11/29/21 1134   BP: 137/67   BP Location: Right arm   Patient Position: Sitting   Cuff Size: Adult   Pulse: 77   Temp: 98.9 °F (37.2 °C)   TempSrc: Temporal   SpO2: 96%   Weight: 104 kg (230 lb)   Height: 162.6 cm (64.02\")     BMI Readings from Last 1 Encounters:   11/29/21 39.46 kg/m²   BMI is above normal parameters. Recommendations include: exercise counseling and nutrition counseling    Does the patient have evidence of cognitive impairment? No    Physical Exam  Vitals reviewed.   Constitutional:       Appearance: Normal appearance. She is well-developed.   HENT:      Head: Normocephalic and atraumatic.   Eyes:      Conjunctiva/sclera: Conjunctivae normal.      Pupils: Pupils are equal, round, and reactive to light.   Cardiovascular:      Rate and Rhythm: Normal rate and regular rhythm.      Heart sounds: Normal heart sounds. No murmur heard.      Pulmonary:      Effort: Pulmonary effort is normal.      Breath sounds: Normal breath sounds. No wheezing or rhonchi.   Abdominal:      General: Bowel sounds are normal. There is no distension.      Palpations: Abdomen is soft.      Tenderness: There is no abdominal tenderness.   Skin:     General: Skin is warm and dry.   Neurological:      Mental Status: She is alert and oriented to person, place, and time.   Psychiatric:         Mood and Affect: Mood and affect normal.         Behavior: Behavior " normal.         Thought Content: Thought content normal.         Judgment: Judgment normal.                 HEALTH RISK ASSESSMENT    Smoking Status:  Social History     Tobacco Use   Smoking Status Current Every Day Smoker   • Packs/day: 1.00   • Types: Cigarettes   Smokeless Tobacco Current User     Alcohol Consumption:  Social History     Substance and Sexual Activity   Alcohol Use Never     Fall Risk Screen:    ZULEMA Fall Risk Assessment was completed, and patient is at LOW risk for falls.Assessment completed on:11/29/2021    Depression Screening:  PHQ-2/PHQ-9 Depression Screening 11/29/2021   Little interest or pleasure in doing things 0   Feeling down, depressed, or hopeless 0   Trouble falling or staying asleep, or sleeping too much 0   Feeling tired or having little energy 0   Poor appetite or overeating 0   Feeling bad about yourself - or that you are a failure or have let yourself or your family down 0   Trouble concentrating on things, such as reading the newspaper or watching television 0   Moving or speaking so slowly that other people could have noticed. Or the opposite - being so fidgety or restless that you have been moving around a lot more than usual 0   Thoughts that you would be better off dead, or of hurting yourself in some way 0   Total Score 0   If you checked off any problems, how difficult have these problems made it for you to do your work, take care of things at home, or get along with other people? Not difficult at all       Health Habits and Functional and Cognitive Screening:  Functional & Cognitive Status 11/29/2021   Do you have difficulty preparing food and eating? No   Do you have difficulty bathing yourself, getting dressed or grooming yourself? No   Do you have difficulty using the toilet? No   Do you have difficulty moving around from place to place? No   Do you have trouble with steps or getting out of a bed or a chair? No   Current Diet Unhealthy Diet   Dental Exam Not up to  date   Eye Exam Not up to date   Exercise (times per week) 2 times per week   Current Exercises Include Stationary Bicycling/Spin Class   Do you need help using the phone?  No   Are you deaf or do you have serious difficulty hearing?  No   Do you need help with transportation? No   Do you need help shopping? No   Do you need help preparing meals?  No   Do you need help with housework?  No   Do you need help with laundry? No   Do you need help taking your medications? No   Do you need help managing money? No   Do you ever drive or ride in a car without wearing a seat belt? No   Have you felt unusual stress, anger or loneliness in the last month? No   Who do you live with? Sibling   If you need help, do you have trouble finding someone available to you? No   Have you been bothered in the last four weeks by sexual problems? No   Do you have difficulty concentrating, remembering or making decisions? No       Age-appropriate Screening Schedule:  Refer to the list below for future screening recommendations based on patient's age, sex and/or medical conditions. Orders for these recommended tests are listed in the plan section. The patient has been provided with a written plan.    Health Maintenance   Topic Date Due   • PAP SMEAR  06/12/2021   • LIPID PANEL  06/25/2021   • INFLUENZA VACCINE  11/17/2022 (Originally 8/1/2021)   • ZOSTER VACCINE (2 of 2) 01/12/2022   • MAMMOGRAM  08/16/2023   • TDAP/TD VACCINES (2 - Td or Tdap) 03/31/2026              Assessment/Plan   CMS Preventative Services Quick Reference  Risk Factors Identified During Encounter  None Identified  The above risks/problems have been discussed with the patient.  Follow up actions/plans if indicated are seen below in the Assessment/Plan Section.  Pertinent information has been shared with the patient in the After Visit Summary.    Diagnoses and all orders for this visit:    1. Medicare annual wellness visit, subsequent (Primary)    2. Class 2 obesity due to  excess calories without serious comorbidity with body mass index (BMI) of 39.0 to 39.9 in adult  Comments:  Continue to work on diet and exercise. Increase water intake to 90 oz per day.     The patient is asked to make an attempt to improve diet and exercise patterns to aid in medical management of this problem.      Follow Up:   Return for Keep follow up as scheduled, Well Women Exam.     An After Visit Summary and PPPS were made available to the patient.    Updated annual wellness visit checklist.  Immunizations up-to-date.  Screening up-to-date or ordered.  Recommend yearly dental and eye exams. Also discussed monitoring of blood pressure and lipids.      ALYSSA Lowe

## 2021-12-08 ENCOUNTER — OFFICE VISIT (OUTPATIENT)
Dept: FAMILY MEDICINE CLINIC | Facility: CLINIC | Age: 51
End: 2021-12-08

## 2021-12-08 VITALS
HEIGHT: 64 IN | BODY MASS INDEX: 39.27 KG/M2 | DIASTOLIC BLOOD PRESSURE: 71 MMHG | WEIGHT: 230 LBS | HEART RATE: 70 BPM | SYSTOLIC BLOOD PRESSURE: 128 MMHG | TEMPERATURE: 97.7 F | OXYGEN SATURATION: 97 %

## 2021-12-08 DIAGNOSIS — Z01.419 ROUTINE GYNECOLOGICAL EXAMINATION: Primary | ICD-10-CM

## 2021-12-08 DIAGNOSIS — T78.40XD ALLERGY, SUBSEQUENT ENCOUNTER: ICD-10-CM

## 2021-12-08 LAB
CANDIDA SPECIES: NEGATIVE
GARDNERELLA VAGINALIS: NEGATIVE
T VAGINALIS DNA VAG QL PROBE+SIG AMP: NEGATIVE

## 2021-12-08 PROCEDURE — 87624 HPV HI-RISK TYP POOLED RSLT: CPT | Performed by: NURSE PRACTITIONER

## 2021-12-08 PROCEDURE — 87660 TRICHOMONAS VAGIN DIR PROBE: CPT | Performed by: NURSE PRACTITIONER

## 2021-12-08 PROCEDURE — G0123 SCREEN CERV/VAG THIN LAYER: HCPCS | Performed by: NURSE PRACTITIONER

## 2021-12-08 PROCEDURE — 87510 GARDNER VAG DNA DIR PROBE: CPT | Performed by: NURSE PRACTITIONER

## 2021-12-08 PROCEDURE — 87480 CANDIDA DNA DIR PROBE: CPT | Performed by: NURSE PRACTITIONER

## 2021-12-08 PROCEDURE — 99396 PREV VISIT EST AGE 40-64: CPT | Performed by: NURSE PRACTITIONER

## 2021-12-08 RX ORDER — METHYLPREDNISOLONE 4 MG/1
TABLET ORAL
Qty: 1 EACH | Refills: 0 | Status: SHIPPED | OUTPATIENT
Start: 2021-12-08 | End: 2022-03-14

## 2021-12-08 NOTE — PROGRESS NOTES
Female Physical / PAP Note      Patient Name: Slime Mcelroy  : 1970   MRN: [id number]     Chief Complaint:    Chief Complaint   Patient presents with   • Gynecologic Exam       History of Present Illness: Slime Mcelroy is a 51 y.o. female who is here today for their annual health maintenance and physical.      Subjective      Review of Systems:   Review of Systems   Constitutional: Negative for chills, fatigue and fever.   HENT: Positive for postnasal drip.    Respiratory: Negative for cough and shortness of breath.    Cardiovascular: Negative for chest pain and palpitations.   Gastrointestinal: Negative for abdominal pain, constipation, diarrhea, nausea and vomiting.   Genitourinary: Negative for difficulty urinating, menstrual problem, pelvic pain, vaginal discharge and vaginal pain.   Skin: Negative for rash.   Allergic/Immunologic: Positive for environmental allergies.   Neurological: Negative for dizziness and headaches.      Breast - No tenderness, lumps, discharge, or blood from nipples.      Past Medical History, Social History, Family History and Care Team were all reviewed with patient and updated as appropriate.     Medications:     Current Outpatient Medications:   •  azithromycin (Zithromax Z-Sony) 250 MG tablet, Take 2 tablets by mouth on day 1, then 1 tablet daily on days 2-5, Disp: 6 tablet, Rfl: 0  •  fexofenadine (ALLEGRA) 180 MG tablet, Take 180 mg by mouth Daily., Disp: , Rfl:   •  fluticasone (FLONASE) 50 MCG/ACT nasal spray, 2 sprays into the nostril(s) as directed by provider Daily., Disp: 16 g, Rfl: 5  •  gabapentin (NEURONTIN) 300 MG capsule, Take 1 capsule by mouth 2 (Two) Times a Day., Disp: 180 capsule, Rfl: 0  •  montelukast (SINGULAIR) 10 MG tablet, Take 1 tablet by mouth Every Night., Disp: 90 tablet, Rfl: 1  •  methylPREDNISolone (MEDROL) 4 MG dose pack, Take as directed on package instructions., Disp: 1 each, Rfl: 0    Allergies:   Allergies   Allergen Reactions  "  • Penicillins Hives   • Amoxicillin Hives   • Cephalosporins Hives   • Codeine Hives   • Diphenhydramine Hives   • Hydrocodone-Acetaminophen Hives         Colorectal Screening:     Last Completed Colonoscopy          COLORECTAL CANCER SCREENING (COLONOSCOPY - Every 10 Years) Next due on 8/7/2029 08/07/2019  COLONOSCOPY (Done)              Pap:    Last Completed Pap Smear     This patient has no relevant Health Maintenance data.         LMP: No LMP recorded. Patient is postmenopausal.   Mammogram:    Last Completed Mammogram          MAMMOGRAM (Every 2 Years) Next due on 8/16/2023 08/16/2021  Mammo Screening Digital Tomosynthesis Bilateral With CAD    09/04/2020  Mammo Diagnostic Digital Tomosynthesis Right With CAD    08/13/2020  Mammo Screening Digital Tomosynthesis Bilateral With CAD    07/23/2019  Mammo Screening Digital Tomosynthesis Bilateral With CAD    05/16/2019  Outside Procedure: HC MAMMOGRAM SCREENING BILAT DIGITAL W CAD,INACTIVE -HC MAMMOGRAM SCREENING BILAT DIGITAL W CAD,CHG SCREENING DIGITAL BREAST TOMOSYNTHESIS BI    Only the first 5 history entries have been loaded, but more history exists.                   Objective     Physical Exam:  Vital Signs:   Vitals:    12/08/21 0659   BP: 128/71   BP Location: Right arm   Patient Position: Sitting   Cuff Size: Adult   Pulse: 70   Temp: 97.7 °F (36.5 °C)   TempSrc: Temporal   SpO2: 97%   Weight: 104 kg (230 lb)   Height: 162.6 cm (64.02\")     Body mass index is 39.46 kg/m².     Physical Exam  Vitals reviewed. Exam conducted with a chaperone present.   Constitutional:       Appearance: She is well-developed.   HENT:      Head: Normocephalic and atraumatic.   Eyes:      General: No scleral icterus.        Right eye: No discharge.         Left eye: No discharge.      Conjunctiva/sclera: Conjunctivae normal.      Pupils: Pupils are equal, round, and reactive to light.   Neck:      Thyroid: No thyromegaly.   Cardiovascular:      Rate and Rhythm: Normal " rate and regular rhythm.      Heart sounds: Normal heart sounds. No murmur heard.  No friction rub. No gallop.    Pulmonary:      Effort: Pulmonary effort is normal. No respiratory distress.      Breath sounds: Normal breath sounds. No wheezing or rales.   Chest:      Chest wall: No mass, deformity, swelling or tenderness.   Breasts: Breasts are symmetrical.      Right: No mass, nipple discharge, skin change or tenderness.      Left: No mass, nipple discharge, skin change or tenderness.       Abdominal:      General: Bowel sounds are normal. There is no distension.      Palpations: Abdomen is soft. There is no mass.      Tenderness: There is no abdominal tenderness. There is no guarding or rebound.      Hernia: There is no hernia in the left inguinal area or right inguinal area.   Genitourinary:     Exam position: Lithotomy position.      Pubic Area: No rash.       Labia:         Right: No rash.         Left: No rash.       Urethra: No prolapse.      Vagina: Normal. No vaginal discharge.      Cervix: Normal.      Uterus: Normal.       Adnexa: Right adnexa normal and left adnexa normal.        Right: No mass or tenderness.          Left: No mass or tenderness.        Rectum: Normal.   Musculoskeletal:         General: No tenderness or deformity. Normal range of motion.   Lymphadenopathy:      Cervical: No cervical adenopathy.      Lower Body: No right inguinal adenopathy. No left inguinal adenopathy.   Skin:     General: Skin is warm and dry.      Findings: No erythema or rash.   Neurological:      Mental Status: She is alert and oriented to person, place, and time.      Cranial Nerves: No cranial nerve deficit.      Sensory: No sensory deficit.      Motor: No abnormal muscle tone.      Coordination: Coordination normal.      Deep Tendon Reflexes: Reflexes normal.   Psychiatric:         Behavior: Behavior normal.         Thought Content: Thought content normal.         Judgment: Judgment normal.          Procedures    Assessment / Plan      Assessment/Plan:   Diagnoses and all orders for this visit:    1. Routine gynecological examination (Primary)  -     IgP, Aptima HPV  -     Gardnerella vaginalis, Trichomonas vaginalis, Candida albicans, DNA - Swab, Vagina    2. Allergy, subsequent encounter  -     methylPREDNISolone (MEDROL) 4 MG dose pack; Take as directed on package instructions.  Dispense: 1 each; Refill: 0               Follow Up:   Return for Pending results.    Healthcare Maintenance:     Counseled on monthly breast self exams.    Counseled on diet and exercise.    Counseled on weightbearing exercise.    Recommend calcium with vitamin D twice daily.     Slime Mcelroy voices understanding and acceptance of this advice and will call back with any further questions or concerns. AVS with preventive healthcare tips printed for patient.     ALYSSA Lowe        Please note that portions of this note may have been completed with a voice recognition program. Efforts were made to edit the dictations, but occasionally words are mistranscribed.

## 2021-12-12 LAB
CYTOLOGIST CVX/VAG CYTO: NORMAL
CYTOLOGY CVX/VAG DOC CYTO: NORMAL
CYTOLOGY CVX/VAG DOC THIN PREP: NORMAL
DX ICD CODE: NORMAL
HIV 1 & 2 AB SER-IMP: NORMAL
HPV I/H RISK 4 DNA CVX QL PROBE+SIG AMP: NEGATIVE
OTHER STN SPEC: NORMAL
STAT OF ADQ CVX/VAG CYTO-IMP: NORMAL

## 2021-12-13 ENCOUNTER — TELEPHONE (OUTPATIENT)
Dept: FAMILY MEDICINE CLINIC | Facility: CLINIC | Age: 51
End: 2021-12-13

## 2021-12-13 NOTE — TELEPHONE ENCOUNTER
cbAskem message sent    ----- Message from ALYSSA Lowe sent at 12/12/2021  6:39 PM EST -----  Normal Pap. Continue with routine screening as recommended.

## 2022-02-12 DIAGNOSIS — M54.32 SCIATICA OF LEFT SIDE: ICD-10-CM

## 2022-02-14 RX ORDER — GABAPENTIN 300 MG/1
CAPSULE ORAL
Qty: 180 CAPSULE | Refills: 1 | Status: SHIPPED | OUTPATIENT
Start: 2022-02-14 | End: 2022-05-18 | Stop reason: SDUPTHER

## 2022-03-08 ENCOUNTER — TELEPHONE (OUTPATIENT)
Dept: FAMILY MEDICINE CLINIC | Facility: CLINIC | Age: 52
End: 2022-03-08

## 2022-03-08 ENCOUNTER — OFFICE VISIT (OUTPATIENT)
Dept: ORTHOPEDIC SURGERY | Facility: CLINIC | Age: 52
End: 2022-03-08

## 2022-03-08 VITALS — BODY MASS INDEX: 40.74 KG/M2 | OXYGEN SATURATION: 97 % | HEIGHT: 64 IN | WEIGHT: 238.6 LBS | HEART RATE: 76 BPM

## 2022-03-08 DIAGNOSIS — M77.11 LATERAL EPICONDYLITIS OF BOTH ELBOWS: Primary | ICD-10-CM

## 2022-03-08 DIAGNOSIS — M77.12 LATERAL EPICONDYLITIS OF BOTH ELBOWS: Primary | ICD-10-CM

## 2022-03-08 PROCEDURE — 20551 NJX 1 TENDON ORIGIN/INSJ: CPT | Performed by: ORTHOPAEDIC SURGERY

## 2022-03-08 PROCEDURE — 99213 OFFICE O/P EST LOW 20 MIN: CPT | Performed by: ORTHOPAEDIC SURGERY

## 2022-03-08 RX ORDER — BUPIVACAINE HYDROCHLORIDE 2.5 MG/ML
1 INJECTION, SOLUTION INFILTRATION; PERINEURAL
Status: COMPLETED | OUTPATIENT
Start: 2022-03-08 | End: 2022-03-08

## 2022-03-08 RX ORDER — TRIAMCINOLONE ACETONIDE 40 MG/ML
40 INJECTION, SUSPENSION INTRA-ARTICULAR; INTRAMUSCULAR
Status: COMPLETED | OUTPATIENT
Start: 2022-03-08 | End: 2022-03-08

## 2022-03-08 RX ADMIN — TRIAMCINOLONE ACETONIDE 40 MG: 40 INJECTION, SUSPENSION INTRA-ARTICULAR; INTRAMUSCULAR at 09:50

## 2022-03-08 RX ADMIN — BUPIVACAINE HYDROCHLORIDE 1 ML: 2.5 INJECTION, SOLUTION INFILTRATION; PERINEURAL at 09:50

## 2022-03-08 NOTE — TELEPHONE ENCOUNTER
Caller: Slime Mcelroy A    Relationship to patient: Self    Best call back number: [phone] OKAY TO LEAVE MESSAGE    Patient is needing: PATIENT CALLED IN AND SAID SHE WOULD LIKE TO BE SEEN FOR DIZZINESS THAT USUALLY HAPPENS AT NIGHT. SHE STATED SHE WOULD ONLY LIKE TO SEE ALYSSA RAMÍREZ. NOTHING AVAILABLE WITH [facility]  UNTIL NEXT WEEK. PLEASE CALL PATIENT AND ADVISE.

## 2022-03-08 NOTE — PROGRESS NOTES
"Chief Complaint  Initial Evaluation of the Right Elbow and Initial Evaluation of the Left Elbow     Subjective      Slime Mcelroy presents to [facility] for an evaluation of bilateral elbow pain. Patient states her right elbow is far more worse than the left. She has numbness and tingling in the elbow that radiates down the forearm at times. She states that she also has burning sensation in the elbows as well. She had 2 surgeries on the left elbow and 1 on the right elbow 20 years ago for cubital tunnel. She states that she has to sleep with her arm straight.     Allergies   Allergen Reactions   • Penicillins Hives   • Amoxicillin Hives   • Cephalosporins Hives   • Codeine Hives   • Diphenhydramine Hives   • Hydrocodone-Acetaminophen Hives        Social History     Socioeconomic History   • Marital status:    Tobacco Use   • Smoking status: Current Every Day Smoker     Packs/day: 1.00     Types: Cigarettes   • Smokeless tobacco: Current User   Vaping Use   • Vaping Use: Never used   Substance and Sexual Activity   • Alcohol use: Never   • Drug use: Never        Review of Systems     Objective   Vital Signs:   Pulse 76   Ht 162.6 cm (64\")   Wt 108 kg (238 lb 9.6 oz)   SpO2 97%   BMI 40.96 kg/m²       Physical Exam  Constitutional:       Appearance: Normal appearance. Patient is well-developed and normal weight.   HENT:      Head: Normocephalic.      Right Ear: Hearing and external ear normal.      Left Ear: Hearing and external ear normal.      Nose: Nose normal.   Eyes:      Conjunctiva/sclera: Conjunctivae normal.   Cardiovascular:      Rate and Rhythm: Normal rate.   Pulmonary:      Effort: Pulmonary effort is normal.      Breath sounds: No wheezing or rales.   Abdominal:      Palpations: Abdomen is soft.      Tenderness: There is no abdominal tenderness.   Musculoskeletal:      Cervical back: Normal range of motion.   Skin:     Findings: No rash.   Neurological:      " Mental Status: Patient is alert and oriented to person, place, and time.   Psychiatric:         Mood and Affect: Mood and affect normal.         Judgment: Judgment normal.       Ortho Exam      RIGHT ELBOW: Full elbow flexion and extension. Good tone of deltoid, biceps, triceps, wrist extensors, and wrist flexors.  Full wrist flexion and extension. Radial pulse 2+, ulnar pulse 2+. No swelling, skin discoloration or atrophy. Tenderness about the lateral epicondyle.     LEFT ELBOW: Good tone of deltoid, biceps, triceps, wrist extensors, and wrist flexors.  Sensation grossly intact. Neurovascular intact.  Radial pulse 2+, ulnar pulse 2+. No swelling, skin discoloration or atrophy. Tender lateral epicondyle. Full elbow flexion and extension.       Small Joint Arthrocentesis  Consent given by: patient  Site marked: site marked  Timeout: Immediately prior to procedure a time out was called to verify the correct patient, procedure, equipment, support staff and site/side marked as required   Procedure Details  Preparation: Patient was prepped and draped in the usual sterile fashion  Needle gauge: 23G.  Medications administered: 1 mL bupivacaine 0.25 %; 40 mg triamcinolone acetonide 40 MG/ML  Patient tolerance: patient tolerated the procedure well with no immediate complications            Imaging Results (Most Recent)     None           Result Review :         No results found.           Assessment and Plan     DX: Bilateral tennis elbow     Discussed treatment plans and diagnosis with the patient. She was given a right elbow injection, she tolerated this well. Home stretches provided for the patient.     Call or return if worsening symptoms.    Follow Up     4-6 weeks.       Patient was given instructions and counseling regarding her condition or for health maintenance advice. Please see specific information pulled into the AVS if appropriate.     Scribed for Hannah Cerda MD by Maria M Dent.  03/08/22   09:42  EST        I have personally performed the services described in this document as scribed by the above individual and it is both accurate and complete. Hannah Cerda MD 03/08/22

## 2022-03-14 ENCOUNTER — OFFICE VISIT (OUTPATIENT)
Dept: FAMILY MEDICINE CLINIC | Facility: CLINIC | Age: 52
End: 2022-03-14

## 2022-03-14 VITALS
HEIGHT: 64 IN | OXYGEN SATURATION: 96 % | TEMPERATURE: 97.8 F | SYSTOLIC BLOOD PRESSURE: 121 MMHG | DIASTOLIC BLOOD PRESSURE: 72 MMHG | WEIGHT: 232 LBS | BODY MASS INDEX: 39.61 KG/M2 | HEART RATE: 67 BPM

## 2022-03-14 DIAGNOSIS — R42 DIZZINESS: Primary | ICD-10-CM

## 2022-03-14 DIAGNOSIS — Z13.29 SCREENING FOR THYROID DISORDER: ICD-10-CM

## 2022-03-14 PROBLEM — L60.0 INGROWING TOENAIL: Status: ACTIVE | Noted: 2022-03-14

## 2022-03-14 PROBLEM — M25.50 ARTHRALGIA: Status: ACTIVE | Noted: 2022-03-14

## 2022-03-14 PROBLEM — R53.83 FATIGUE: Status: ACTIVE | Noted: 2022-03-14

## 2022-03-14 PROBLEM — L84 CORNS AND CALLUS: Status: ACTIVE | Noted: 2022-03-14

## 2022-03-14 PROBLEM — M17.9 OSTEOARTHRITIS OF KNEE: Status: ACTIVE | Noted: 2018-05-01

## 2022-03-14 PROBLEM — T14.8XXA BROKEN BONES: Status: ACTIVE | Noted: 2022-03-14

## 2022-03-14 PROBLEM — B07.0 PLANTAR WART: Status: ACTIVE | Noted: 2022-03-14

## 2022-03-14 PROBLEM — N20.0 CALCULUS OF KIDNEY: Status: ACTIVE | Noted: 2022-03-14

## 2022-03-14 PROBLEM — M79.89 LIMB SWELLING: Status: ACTIVE | Noted: 2022-03-14

## 2022-03-14 PROBLEM — M12.9 ARTHROPATHY, UNSPECIFIED: Status: ACTIVE | Noted: 2022-03-14

## 2022-03-14 PROBLEM — M25.572 ANKLE PAIN, LEFT: Status: ACTIVE | Noted: 2022-03-14

## 2022-03-14 PROCEDURE — 99213 OFFICE O/P EST LOW 20 MIN: CPT | Performed by: NURSE PRACTITIONER

## 2022-03-14 PROCEDURE — 85027 COMPLETE CBC AUTOMATED: CPT | Performed by: NURSE PRACTITIONER

## 2022-03-14 PROCEDURE — 84443 ASSAY THYROID STIM HORMONE: CPT | Performed by: NURSE PRACTITIONER

## 2022-03-14 PROCEDURE — 80048 BASIC METABOLIC PNL TOTAL CA: CPT | Performed by: NURSE PRACTITIONER

## 2022-03-14 NOTE — PROGRESS NOTES
Chief Complaint  Dizziness    Subjective          Slime Mcelroy is a 51 y.o. female who presents to [facility]    History of Present Illness    Dizziness - onset after riding on [employer] round. Then pt reports she awoke, when she rolled over in the middle of the night dizziness would occur.     Dizziness is now resolved. After 2 days of dizziness.     PHQ-2 Total Score:     PHQ-9 Total Score:         Review of Systems   Constitutional: Negative for fatigue and fever.   Respiratory: Negative for chest tightness and shortness of breath.    Cardiovascular: Negative for chest pain.   Neurological: Positive for dizziness. Negative for light-headedness and headaches.          Medical History: has a past medical history of Allergic rhinitis (05/14/2015), Ankle pain, left, Arthralgia, Arthritis, Arthritis, rheumatoid (HCC), Arthropathy, Athlete's foot, Broken bones, Corns and callus, Fatigue, Heel pain (2000), Hemorrhoids, Hyperlipidemia, Ingrowing toenail (2014), Kidney calculus, Limb swelling, Plantar wart (1984), Primary osteoarthritis of left knee (05/01/2018), Renal calculus, and Seasonal allergies.     Surgical History: has a past surgical history that includes Colonoscopy (08/07/2019); Dilation and curettage of uterus; Dilation and curettage of uterus; Dilation and curettage of uterus; Elbow surgery; Hand surgery (Left); Other surgical history; Other surgical history; Tendon repair; Finger surgery; and Toe Nail Amputation (2014).     Family History: family history includes Arthritis in her brother, father, mother, sister, and son; Diabetes type II in her brother, sister, and another family member; Heart disease in an other family member; Osteoporosis in her mother; Stroke in an other family member.     Social History: reports that she has been smoking cigarettes. She has been smoking about 1.00 pack per day. She uses smokeless tobacco. She reports that she does not drink alcohol  "and does not use drugs.    Allergies: Penicillins, Amoxicillin, Cephalosporins, Codeine, Diphenhydramine, and Hydrocodone-acetaminophen      Health Maintenance Due   Topic Date Due   • ZOSTER VACCINE (2 of 2) 01/19/2022            Current Outpatient Medications:   •  Diclofenac Sodium (VOLTAREN) 1 % gel gel, Apply 4 g topically to the appropriate area as directed 4 (Four) Times a Day As Needed (AS NEEDED)., Disp: 100 g, Rfl: 1  •  fexofenadine (ALLEGRA) 180 MG tablet, Take 180 mg by mouth Daily., Disp: , Rfl:   •  fluticasone (FLONASE) 50 MCG/ACT nasal spray, 2 sprays into the nostril(s) as directed by provider Daily., Disp: 16 g, Rfl: 5  •  gabapentin (NEURONTIN) 300 MG capsule, TAKE ONE CAPSULE BY MOUTH TWICE A DAY, Disp: 180 capsule, Rfl: 1  •  montelukast (SINGULAIR) 10 MG tablet, Take 1 tablet by mouth Every Night., Disp: 90 tablet, Rfl: 1      Immunization History   Administered Date(s) Administered   • Shingrix 11/17/2021, 11/24/2021   • Tdap 03/31/2016   • Tetanus Toxoid, Not Adsorbed 06/21/2009         Objective       Vitals:    03/14/22 1446   BP: 121/72   BP Location: Left arm   Patient Position: Sitting   Pulse: 67   Temp: 97.8 °F (36.6 °C)   SpO2: 96%   Weight: 105 kg (232 lb)   Height: 162.6 cm (64\")      Body mass index is 39.82 kg/m².   Wt Readings from Last 3 Encounters:   03/14/22 105 kg (232 lb)   03/08/22 108 kg (238 lb 9.6 oz)   12/08/21 104 kg (230 lb)      BP Readings from Last 3 Encounters:   03/14/22 121/72   12/08/21 128/71   12/07/21 157/80        Physical Exam  Vitals reviewed.   Constitutional:       Appearance: Normal appearance. She is well-developed.   HENT:      Head: Normocephalic and atraumatic.   Eyes:      Conjunctiva/sclera: Conjunctivae normal.      Pupils: Pupils are equal, round, and reactive to light.   Cardiovascular:      Rate and Rhythm: Normal rate and regular rhythm.      Heart sounds: Normal heart sounds. No murmur heard.  Pulmonary:      Effort: Pulmonary effort is " normal.      Breath sounds: Normal breath sounds. No wheezing or rhonchi.   Abdominal:      General: There is no distension.      Tenderness: There is no abdominal tenderness.   Skin:     General: Skin is warm and dry.   Neurological:      Mental Status: She is alert and oriented to person, place, and time.   Psychiatric:         Mood and Affect: Mood and affect normal.         Behavior: Behavior normal.         Thought Content: Thought content normal.         Judgment: Judgment normal.       Result Review :              Assessment and Plan        Diagnoses and all orders for this visit:    1. Dizziness (Primary)  -     CBC (No Diff)  -     Basic Metabolic Panel    2. Screening for thyroid disorder  -     TSH      Change positions slowly.  Call if returns for Meclizine.     Follow Up     Return if symptoms worsen or fail to improve, for Pending results.    Patient was given instructions and counseling regarding her condition or for health maintenance advice. Please see specific information pulled into the AVS if appropriate.     ALYSSA Lowe

## 2022-03-15 LAB
ANION GAP SERPL CALCULATED.3IONS-SCNC: 7.7 MMOL/L (ref 5–15)
BUN SERPL-MCNC: 11 MG/DL (ref 6–20)
BUN/CREAT SERPL: 15.1 (ref 7–25)
CALCIUM SPEC-SCNC: 9.3 MG/DL (ref 8.6–10.5)
CHLORIDE SERPL-SCNC: 101 MMOL/L (ref 98–107)
CO2 SERPL-SCNC: 29.3 MMOL/L (ref 22–29)
CREAT SERPL-MCNC: 0.73 MG/DL (ref 0.57–1)
DEPRECATED RDW RBC AUTO: 40.3 FL (ref 37–54)
EGFRCR SERPLBLD CKD-EPI 2021: 98.5 ML/MIN/1.73
ERYTHROCYTE [DISTWIDTH] IN BLOOD BY AUTOMATED COUNT: 12.4 % (ref 12.3–15.4)
GLUCOSE SERPL-MCNC: 89 MG/DL (ref 65–99)
HCT VFR BLD AUTO: 41.4 % (ref 34–46.6)
HGB BLD-MCNC: 13.8 G/DL (ref 12–15.9)
MCH RBC QN AUTO: 29.9 PG (ref 26.6–33)
MCHC RBC AUTO-ENTMCNC: 33.3 G/DL (ref 31.5–35.7)
MCV RBC AUTO: 89.8 FL (ref 79–97)
PLATELET # BLD AUTO: 297 10*3/MM3 (ref 140–450)
PMV BLD AUTO: 11.2 FL (ref 6–12)
POTASSIUM SERPL-SCNC: 4.7 MMOL/L (ref 3.5–5.2)
RBC # BLD AUTO: 4.61 10*6/MM3 (ref 3.77–5.28)
SODIUM SERPL-SCNC: 138 MMOL/L (ref 136–145)
TSH SERPL DL<=0.05 MIU/L-ACNC: 2.95 UIU/ML (ref 0.27–4.2)
WBC NRBC COR # BLD: 11.12 10*3/MM3 (ref 3.4–10.8)

## 2022-03-16 DIAGNOSIS — R79.89 ABNORMAL CBC: Primary | ICD-10-CM

## 2022-04-01 ENCOUNTER — TELEPHONE (OUTPATIENT)
Dept: FAMILY MEDICINE CLINIC | Facility: CLINIC | Age: 52
End: 2022-04-01

## 2022-04-01 NOTE — TELEPHONE ENCOUNTER
Pt c/o ears ringing, throat is raw, and drainage. Advised pt that Chacha is out of office and other providers do not have availability today - go to Urgent Care. Pt verbalized understanding via phone.

## 2022-05-18 ENCOUNTER — OFFICE VISIT (OUTPATIENT)
Dept: FAMILY MEDICINE CLINIC | Facility: CLINIC | Age: 52
End: 2022-05-18

## 2022-05-18 VITALS
HEART RATE: 66 BPM | WEIGHT: 234 LBS | SYSTOLIC BLOOD PRESSURE: 125 MMHG | OXYGEN SATURATION: 98 % | BODY MASS INDEX: 39.95 KG/M2 | HEIGHT: 64 IN | TEMPERATURE: 98.6 F | DIASTOLIC BLOOD PRESSURE: 55 MMHG

## 2022-05-18 DIAGNOSIS — M20.41 ACQUIRED HAMMERTOES OF BOTH FEET: ICD-10-CM

## 2022-05-18 DIAGNOSIS — M20.42 ACQUIRED HAMMERTOES OF BOTH FEET: ICD-10-CM

## 2022-05-18 DIAGNOSIS — M54.32 SCIATICA OF LEFT SIDE: Primary | ICD-10-CM

## 2022-05-18 DIAGNOSIS — L40.50 PSORIATIC ARTHRITIS: ICD-10-CM

## 2022-05-18 PROCEDURE — 99213 OFFICE O/P EST LOW 20 MIN: CPT | Performed by: NURSE PRACTITIONER

## 2022-05-18 RX ORDER — GABAPENTIN 300 MG/1
300 CAPSULE ORAL 2 TIMES DAILY
Qty: 180 CAPSULE | Refills: 1 | Status: SHIPPED | OUTPATIENT
Start: 2022-05-18 | End: 2023-01-23

## 2022-05-18 NOTE — PROGRESS NOTES
Chief Complaint  Follow-up (6 month C/O toe pain ) and Hyperlipidemia,     Subjective          Slime Mcelroy is a 51 y.o. 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  Patient takes gabapentin with improvement in pain and is able to complete her activities of daily living.    Psoriatic arthritis patient has not been seeing Dr. Sullivan as she has not been having any issues since having her left knee replacement.    Bilateral toe pain in the second and third digits. Pt has appt with podiatry.         Review of Systems   Constitutional: Negative for chills, fatigue and fever.   Respiratory: Negative for cough and shortness of breath.    Cardiovascular: Negative for chest pain and palpitations.   Gastrointestinal: Negative for constipation, diarrhea, nausea and vomiting.   Musculoskeletal: Positive for arthralgias (usha toes) and back pain. Negative for neck pain.   Skin: Negative for rash.   Neurological: Positive for numbness. Negative for dizziness and headaches.          Medical History: has a past medical history of Allergic rhinitis (05/14/2015), Ankle pain, left, Arthralgia, Arthritis, Arthritis, rheumatoid (HCC), Arthropathy, Athlete's foot, Broken bones, Corns and callus, Fatigue, Heel pain (2000), Hemorrhoids, Hyperlipidemia, Ingrowing toenail (2014), Kidney calculus, Limb swelling, Plantar wart (1984), Primary osteoarthritis of left knee (05/01/2018), Renal calculus, and Seasonal allergies.     Surgical History: has a past surgical history that includes Colonoscopy (08/07/2019); Dilation and curettage of uterus; Dilation and curettage of uterus; Dilation and curettage of uterus; Elbow surgery; Hand surgery (Left); Other surgical history; Other surgical history; Tendon repair; Finger surgery; and Toe Nail Amputation (2014).     Family History: family history includes  "Arthritis in her brother, father, mother, sister, and son; Diabetes type II in her brother, sister, and another family member; Heart disease in an other family member; Osteoporosis in her mother; Stroke in an other family member.     Social History: reports that she has been smoking cigarettes. She has been smoking about 1.00 pack per day. She uses smokeless tobacco. She reports that she does not drink alcohol and does not use drugs.    Allergies: Penicillins, Amoxicillin, Cephalosporins, Codeine, Diphenhydramine, and Hydrocodone-acetaminophen      There are no preventive care reminders to display for this patient.         Current Outpatient Medications:   •  Diclofenac Sodium (VOLTAREN) 1 % gel gel, Apply 4 g topically to the appropriate area as directed 4 (Four) Times a Day As Needed (AS NEEDED)., Disp: 100 g, Rfl: 1  •  fexofenadine (ALLEGRA) 180 MG tablet, Take 180 mg by mouth Daily., Disp: , Rfl:   •  fluticasone (FLONASE) 50 MCG/ACT nasal spray, 2 sprays into the nostril(s) as directed by provider Daily., Disp: 16 g, Rfl: 5  •  gabapentin (NEURONTIN) 300 MG capsule, Take 1 capsule by mouth 2 (Two) Times a Day., Disp: 180 capsule, Rfl: 1  •  montelukast (SINGULAIR) 10 MG tablet, Take 1 tablet by mouth Every Night., Disp: 90 tablet, Rfl: 1  •  brompheniramine-pseudoephedrine-DM 30-2-10 MG/5ML syrup, Take 10 mL by mouth 4 (Four) Times a Day As Needed for Congestion, Cough or Allergies., Disp: 180 mL, Rfl: 0      Immunization History   Administered Date(s) Administered   • Shingrix 11/17/2021, 11/24/2021, 04/22/2022   • Tdap 03/31/2016   • Tetanus Toxoid, Not Adsorbed 06/21/2009         Objective       Vitals:    05/18/22 0803   BP: 125/55   BP Location: Right arm   Patient Position: Sitting   Pulse: 66   Temp: 98.6 °F (37 °C)   SpO2: 98%   Weight: 106 kg (234 lb)   Height: 162.6 cm (64\")      Body mass index is 40.17 kg/m².   Wt Readings from Last 3 Encounters:   05/18/22 106 kg (234 lb)   04/01/22 107 kg (236 " lb)   03/14/22 105 kg (232 lb)      BP Readings from Last 3 Encounters:   05/18/22 125/55   04/01/22 134/60   03/14/22 121/72        Physical Exam  Vitals reviewed.   Constitutional:       Appearance: Normal appearance. She is well-developed.   HENT:      Head: Normocephalic and atraumatic.   Eyes:      Conjunctiva/sclera: Conjunctivae normal.      Pupils: Pupils are equal, round, and reactive to light.   Cardiovascular:      Rate and Rhythm: Normal rate and regular rhythm.      Heart sounds: Normal heart sounds. No murmur heard.  Pulmonary:      Effort: Pulmonary effort is normal.      Breath sounds: Normal breath sounds. No wheezing or rhonchi.   Abdominal:      General: Bowel sounds are normal. There is no distension.      Palpations: Abdomen is soft.      Tenderness: There is no abdominal tenderness.   Musculoskeletal:        Feet:    Feet:      Comments: usha tenderness to 2nd and 3rd digits of toes.  Skin:     General: Skin is warm and dry.   Neurological:      Mental Status: She is alert and oriented to person, place, and time.   Psychiatric:         Mood and Affect: Mood and affect normal.         Behavior: Behavior normal.         Thought Content: Thought content normal.         Judgment: Judgment normal.         Result Review :     Common labs    Common Labsle 11/29/21 11/29/21 11/29/21 3/14/22 3/14/22    1215 1215 1215 1513 1513   Glucose  86  89    BUN  14  11    Creatinine  0.73  0.73    eGFR Non African Am  83      Sodium  137  138    Potassium  4.4  4.7    Chloride  101  101    Calcium  9.6  9.3    Albumin  4.40      Total Bilirubin  0.3      Alkaline Phosphatase  108      AST (SGOT)  17      ALT (SGPT)  13      WBC 8.20    11.12 (A)   Hemoglobin 14.1    13.8   Hematocrit 41.3    41.4   Platelets 275    297   Total Cholesterol   156     Triglycerides   144     HDL Cholesterol   34 (A)     LDL Cholesterol    96     (A) Abnormal value                       Assessment and Plan        Diagnoses and all  orders for this visit:    1. Sciatica of left side (Primary)  -     gabapentin (NEURONTIN) 300 MG capsule; Take 1 capsule by mouth 2 (Two) Times a Day.  Dispense: 180 capsule; Refill: 1    2. Psoriatic arthritis (HCC)    3. Acquired hammertoes of both feet  Comments:  Keep appt with podiatry          Follow Up     Return in about 6 months (around 11/18/2022) for Next scheduled follow up.     Obtained a written consent for INDIRA query. Discussed the risks and benefits of the use of controlled substances with the patient, including the risk of tolerance and drug dependence.  The patient has been counseled on the need to have an exit strategy, including potentially discontinuing the use of controlled substances.  INDIRA has or will be reviewed as soon as it becomes available.    Patient was given instructions and counseling regarding her condition or for health maintenance advice. Please see specific information pulled into the AVS if appropriate.     ALYSSA Lowe

## 2022-06-09 ENCOUNTER — TELEPHONE (OUTPATIENT)
Dept: ORTHOPEDIC SURGERY | Facility: CLINIC | Age: 52
End: 2022-06-09

## 2022-06-09 NOTE — TELEPHONE ENCOUNTER
Provider:  DR. NELSON    Caller: PATIENT    Relationship to Patient: SELF     Phone Number:  [phone]    Reason for Call: PT. HAS APPT. FOR 1 YEAR FOLLOW UP RIGHT TOTAL KNEE ON 06/28/22.   SHE HAS SEEN DR. NELSON ABOUT HER RIGHT ELBOW PAIN IN THE PAST- SHE SAID HE TOLD HER THAT IT IS TENNIS ELBOW- AND SHE GOT INJECTION.  SHE IS ASKING IF HE CAN LOOK AT HER ELBOW WHEN SHE IS THERE ON 06/28/22

## 2022-06-17 ENCOUNTER — OFFICE VISIT (OUTPATIENT)
Dept: PODIATRY | Facility: CLINIC | Age: 52
End: 2022-06-17

## 2022-06-17 VITALS
SYSTOLIC BLOOD PRESSURE: 117 MMHG | BODY MASS INDEX: 40.49 KG/M2 | HEIGHT: 64 IN | OXYGEN SATURATION: 95 % | HEART RATE: 68 BPM | WEIGHT: 237.2 LBS | DIASTOLIC BLOOD PRESSURE: 47 MMHG | TEMPERATURE: 96.9 F

## 2022-06-17 DIAGNOSIS — M79.672 FOOT PAIN, BILATERAL: Primary | ICD-10-CM

## 2022-06-17 DIAGNOSIS — M79.671 FOOT PAIN, BILATERAL: Primary | ICD-10-CM

## 2022-06-17 DIAGNOSIS — L60.0 ONYCHOCRYPTOSIS: ICD-10-CM

## 2022-06-17 PROCEDURE — 11720 DEBRIDE NAIL 1-5: CPT | Performed by: PODIATRIST

## 2022-06-17 PROCEDURE — 99203 OFFICE O/P NEW LOW 30 MIN: CPT | Performed by: PODIATRIST

## 2022-06-17 NOTE — PROGRESS NOTES
[facility] - [facility]    Today's Date: 06/17/22    Patient Name: Slime Mcelroy  MRN: [id number]  CSN: [id number]  PCP: Chacha Hurst APRN, Last PCP Visit:  5/18/2022  Referring Provider: Referring, Self    SUBJECTIVE     Chief Complaint   Patient presents with   • Left Foot - Establish Care, Nail Problem     fungus   • Right Foot - Establish Care, Nail Problem     fungus     HPI: Slime Mcelroy, a 51 y.o.female, comes to clinic.    New, Established, New Problem:  New  Location:  Toenails  Duration:   Greater than five years  Onset:  Gradual  Nature:  sore with palpation.  Stable, worsening, improving:   worsening  Aggravating factors:  Pain with shoe gear and ambulation.  Previous Treatment: Unable to trim their own toenails.    No other pedal complaints at this time.    Patient denies any fevers, chills, nausea, vomiting, shortness of breath, nor any other constitutional signs nor symptoms.       Past Medical History:   Diagnosis Date   • Allergic rhinitis 05/14/2015   • Ankle pain, left    • Arthralgia    • Arthritis    • Arthritis, rheumatoid (HCC)    • Arthropathy     Unspecified   • Athlete's foot    • Broken bones    • Corns and callus    • Fatigue     Unspecified type   • Heel pain 2000   • Hemorrhoids     Unspecified without complications   • Hyperlipidemia    • Ingrowing toenail 2014   • Kidney calculus    • Limb swelling    • Plantar wart 1984   • Primary osteoarthritis of left knee 05/01/2018   • Renal calculus    • Seasonal allergies      Past Surgical History:   Procedure Laterality Date   • COLONOSCOPY  08/07/2019    Mild diverticulosis of the left side of the colon, polyp (6mm to 8mm) in the proximal rectum (Polypectomy)   • DILATATION AND CURETTAGE     • DILATATION AND CURETTAGE     • DILATATION AND CURETTAGE     • ELBOW PROCEDURE     • FINGER SURGERY      with thumb   • HAND SURGERY Left    • OTHER SURGICAL HISTORY      Joint Surgery   • OTHER SURGICAL HISTORY       Laparoscopy   • TENDON REPAIR     • TOE NAIL AMPUTATION  2014    Removal     Family History   Problem Relation Age of Onset   • Arthritis Mother    • Osteoporosis Mother    • Arthritis Father    • Diabetes Sister    • Diabetes type II Sister    • Arthritis Sister    • Diabetes Brother    • Diabetes type II Brother    • Arthritis Brother    • Heart disease Maternal Grandmother    • Heart disease Maternal Grandfather    • Arthritis Son    • Cancer Neg Hx    • Stroke Neg Hx      Social History     Socioeconomic History   • Marital status:    Tobacco Use   • Smoking status: Current Every Day Smoker     Packs/day: 1.00     Types: Cigarettes   • Smokeless tobacco: Current User   Vaping Use   • Vaping Use: Never used   Substance and Sexual Activity   • Alcohol use: Never   • Drug use: Never   • Sexual activity: Defer     Allergies   Allergen Reactions   • Penicillins Hives   • Amoxicillin Hives   • Cephalosporins Hives   • Codeine Hives   • Diphenhydramine Hives   • Hydrocodone-Acetaminophen Hives     Current Outpatient Medications   Medication Sig Dispense Refill   • Diclofenac Sodium (VOLTAREN) 1 % gel gel Apply 4 g topically to the appropriate area as directed 4 (Four) Times a Day As Needed (AS NEEDED). 100 g 1   • fexofenadine (ALLEGRA) 180 MG tablet Take 180 mg by mouth Daily.     • fluticasone (FLONASE) 50 MCG/ACT nasal spray 2 sprays into the nostril(s) as directed by provider Daily. 16 g 5   • gabapentin (NEURONTIN) 300 MG capsule Take 1 capsule by mouth 2 (Two) Times a Day. 180 capsule 1   • montelukast (SINGULAIR) 10 MG tablet Take 1 tablet by mouth Every Night. 90 tablet 1     No current facility-administered medications for this visit.     Review of Systems   Constitutional: Negative.    Skin:        Painful toenails.   All other systems reviewed and are negative.      OBJECTIVE     Vitals:    06/17/22 0746   BP: 117/47   Pulse: 68   Temp: 96.9 °F (36.1 °C)   SpO2: 95%       Patient seen in no apparent  distress.      PHYSICAL EXAM:     Foot/Ankle Exam:       General:   Appearance: appears stated age and healthy    Orientation: AAOx3    Affect: appropriate    Gait: unimpaired    Shoe Gear:  Sandals    VASCULAR      Right Foot Vascularity   Normal vascular exam    Dorsalis pedis:  2+  Posterior tibial:  2+  Skin Temperature: warm    Edema Grading:  None  CFT:  < 3 seconds  Pedal Hair Growth:  Absent  Varicosities: none       Left Foot Vascularity   Normal vascular exam    Dorsalis pedis:  2+  Posterior tibial:  2+  Skin Temperature: warm    Edema Grading:  None  CFT:  < 3 seconds  Pedal Hair Growth:  Absent  Varicosities: none        NEUROLOGIC     Right Foot Neurologic   Normal sensation    Light touch sensation:  Normal  Vibratory sensation:  Normal  Hot/Cold sensation: normal    Protective Sensation using [place] Monofilament:  10     Left Foot Neurologic   Normal sensation    Light touch sensation:  Normal  Vibratory sensation:  Normal  Hot/cold sensation: normal    Protective Sensation using [place] Monofilament:  10     MUSCLE STRENGTH     Right Foot Muscle Strength   Foot dorsiflexion:  4-  Foot plantar flexion:  4-  Foot inversion:  4-  Foot eversion:  4-     Left Foot Muscle Strength   Foot dorsiflexion:  4-  Foot plantar flexion:  4-  Foot inversion:  4-  Foot eversion:  4-     RANGE OF MOTION      Right Foot Range of Motion   Foot and ankle ROM within normal limits       Left Foot Range of Motion   Foot and ankle ROM within normal limits       DERMATOLOGIC     Right Foot Dermatologic   Skin: skin intact    Nails: onychomycosis, abnormally thick, subungual debris, dystrophic nails and ingrown toenail    Nails comment:  Left third lateral nail border     Left Foot Dermatologic   Skin: skin intact    Nails: onychomycosis, abnormally thick, subungual debris, dystrophic nails and ingrown toenail    Nails comment:  Left third lateral nail border      ASSESSMENT/PLAN     Diagnoses and all  orders for this visit:    1. Foot pain, bilateral (Primary)    2. Onychocryptosis        Comprehensive lower extremity examination and evaluation was performed.    Discussed findings and treatment plan including risks, benefits, and treatment options with patient in detail. Patient agreed with treatment plan.    Toenails 3 on Right and 3 on Left were debrided with nail nippers.  Patient tolerated procedure well without complications.    An After Visit Summary was printed and given to the patient at discharge, including (if requested) any available informative/educational handouts regarding diagnosis, treatment, or medications. All questions were answered to patient/family satisfaction. Should symptoms fail to improve or worsen they agree to call or return to clinic or to go to the Emergency Department. Discussed the importance of following up with any needed screening tests/labs/specialist appointments and any requested follow-up recommended by me today. Importance of maintaining follow-up discussed and patient accepts that missed appointments can delay diagnosis and potentially lead to worsening of conditions.    Return if symptoms worsen or fail to improve, for Patient request PRN follow-up.  ., or sooner if acute issues arise.    This document has been electronically signed by Santana Burns DPM on June 17, 2022 08:30 EDT

## 2022-06-28 ENCOUNTER — OFFICE VISIT (OUTPATIENT)
Dept: ORTHOPEDIC SURGERY | Facility: CLINIC | Age: 52
End: 2022-06-28

## 2022-06-28 VITALS — HEIGHT: 64 IN | BODY MASS INDEX: 40.46 KG/M2 | WEIGHT: 237 LBS

## 2022-06-28 DIAGNOSIS — Z96.652 S/P TKR (TOTAL KNEE REPLACEMENT), LEFT: Primary | ICD-10-CM

## 2022-06-28 PROCEDURE — 99212 OFFICE O/P EST SF 10 MIN: CPT | Performed by: ORTHOPAEDIC SURGERY

## 2022-06-28 NOTE — PROGRESS NOTES
"Chief Complaint  Follow-up of the Left Knee     Subjective      Slime Mcelroy presents to [facility] for a follow-up of left knee. Patient is s/p left TKA performed 1/11/2021 by Dr. Cerda. She is here for a 1-year check up. No new complaints of the left total knee replacement. She denies fevers and chills today. She states the knee replacement has given her significant relief. She hasn't had any complications.     Allergies   Allergen Reactions   • Penicillins Hives   • Amoxicillin Hives   • Cephalosporins Hives   • Codeine Hives   • Diphenhydramine Hives   • Hydrocodone-Acetaminophen Hives        Social History     Socioeconomic History   • Marital status:    Tobacco Use   • Smoking status: Current Every Day Smoker     Packs/day: 1.00     Types: Cigarettes   • Smokeless tobacco: Current User   Vaping Use   • Vaping Use: Never used   Substance and Sexual Activity   • Alcohol use: Never   • Drug use: Never   • Sexual activity: Defer        Review of Systems     Objective   Vital Signs:   Ht 162.6 cm (64\")   Wt 108 kg (237 lb)   BMI 40.68 kg/m²       Physical Exam  Constitutional:       Appearance: Normal appearance. Patient is well-developed and normal weight.   HENT:      Head: Normocephalic.      Right Ear: Hearing and external ear normal.      Left Ear: Hearing and external ear normal.      Nose: Nose normal.   Eyes:      Conjunctiva/sclera: Conjunctivae normal.   Cardiovascular:      Rate and Rhythm: Normal rate.   Pulmonary:      Effort: Pulmonary effort is normal.      Breath sounds: No wheezing or rales.   Abdominal:      Palpations: Abdomen is soft.      Tenderness: There is no abdominal tenderness.   Musculoskeletal:      Cervical back: Normal range of motion.   Skin:     Findings: No rash.   Neurological:      Mental Status: Patient is alert and oriented to person, place, and time.   Psychiatric:         Mood and Affect: Mood and affect normal.         Judgment: " Judgment normal.       Ortho Exam      LEFT KNEE: Calf supple, non-tender, no signs of DVT. Sensation grossly intact. Neurovascular intact.  Good strength to hamstrings, quadriceps, dorsiflexors and plantar flexors. Stable to varus/valgus stress. Well tracking patella. Non-tender joint lines. Scars well healed. Non-antalgic gait. Full extension. Flexion to 120 degrees. No swelling, skin discoloration or atrophy.       Procedures      Imaging Results (Most Recent)     Procedure Component Value Units Date/Time    XR Knee 3 View Left [[id number]] Resulted: 06/28/22 0805     Updated: 06/28/22 0808           Result Review :     X-Ray Report:  Left knee(s) X-Ray  Indication: Evaluation of left knee pain   AP, Lateral and Standing view(s)  Findings: Intact left total knee replacement with no evidence of wearing or loosening. No fractures.   Prior studies available for comparison: yes     Assessment and Plan     Diagnoses and all orders for this visit:    1. S/P TKR (total knee replacement), left (Primary)  -     XR Knee 3 View Left        Follow-up in 2 years for re-check.     Call or return if worsening symptoms.    Follow Up     PRN.       Patient was given instructions and counseling regarding her condition or for health maintenance advice. Please see specific information pulled into the AVS if appropriate.     Scribed for Hannah Cerda MD by Maria M Dent.  06/28/22   08:00 EDT    I have personally performed the services described in this document as scribed by the above individual and it is both accurate and complete. Hannah Cerda MD 06/28/22

## 2022-07-15 ENCOUNTER — TRANSCRIBE ORDERS (OUTPATIENT)
Dept: ADMINISTRATIVE | Facility: HOSPITAL | Age: 52
End: 2022-07-15

## 2022-07-15 DIAGNOSIS — Z12.31 BREAST CANCER SCREENING BY MAMMOGRAM: Primary | ICD-10-CM

## 2022-07-21 DIAGNOSIS — J30.2 SEASONAL ALLERGIES: ICD-10-CM

## 2022-07-21 RX ORDER — MONTELUKAST SODIUM 10 MG/1
TABLET ORAL
Qty: 90 TABLET | Refills: 1 | Status: SHIPPED | OUTPATIENT
Start: 2022-07-21 | End: 2023-01-17

## 2022-07-28 ENCOUNTER — TELEPHONE (OUTPATIENT)
Dept: FAMILY MEDICINE CLINIC | Facility: CLINIC | Age: 52
End: 2022-07-28

## 2022-07-28 RX ORDER — ESCITALOPRAM OXALATE 10 MG/1
10 TABLET ORAL DAILY
Qty: 30 TABLET | Refills: 1 | Status: SHIPPED | OUTPATIENT
Start: 2022-07-28 | End: 2022-10-05

## 2022-07-28 RX ORDER — HYDROXYZINE HYDROCHLORIDE 25 MG/1
25 TABLET, FILM COATED ORAL 3 TIMES DAILY PRN
Qty: 90 TABLET | Refills: 0 | Status: SHIPPED | OUTPATIENT
Start: 2022-07-28 | End: 2022-08-27

## 2022-07-28 NOTE — TELEPHONE ENCOUNTER
Patient states she would like to do hydroxyzine and daily medication. Hydroxyzine already sent.     Follow up appointment made

## 2022-07-28 NOTE — TELEPHONE ENCOUNTER
Recommend patient try hydroxyzine 25 mg tablet 1 tablet p.o. 3 times daily as needed anxiety.  This is an as needed medication it is an old allergy medication so it is safe to take.  Patient likely needs to start a daily anxiety medicine as her situation is not likely to change in the next 6 months.  If patient is willing to take a daily medication we can start her and she can follow-up in 1 month, please advise

## 2022-07-28 NOTE — TELEPHONE ENCOUNTER
Patient called stating that she is under a lot of stress and anxiety due to many of her grandchildren being in the household with her. She states that because of her anxiety is has caused her to have chest pain and wants to know if we can increase her Gabapentin to help with the pain. Patient refuses to go to the ER for chest pain.   I told the patient that Gabapentin is for her sciatica and not anxiety but I assured her I would notify you to see what you want to do.

## 2022-08-05 ENCOUNTER — OFFICE VISIT (OUTPATIENT)
Dept: FAMILY MEDICINE CLINIC | Facility: CLINIC | Age: 52
End: 2022-08-05

## 2022-08-05 VITALS
WEIGHT: 239 LBS | HEIGHT: 64 IN | DIASTOLIC BLOOD PRESSURE: 57 MMHG | HEART RATE: 70 BPM | SYSTOLIC BLOOD PRESSURE: 133 MMHG | BODY MASS INDEX: 40.8 KG/M2 | OXYGEN SATURATION: 99 %

## 2022-08-05 DIAGNOSIS — L91.8 SKIN TAG: Primary | ICD-10-CM

## 2022-08-05 PROCEDURE — 99213 OFFICE O/P EST LOW 20 MIN: CPT | Performed by: NURSE PRACTITIONER

## 2022-08-05 NOTE — PROGRESS NOTES
Chief Complaint  Rash (Skin tags)    Subjective          Slime Mcelroy is a 51 y.o. female who presents to [facility]    History of Present Illness    Skin tags - two skin tags in left axillary region.     PHQ-2 Total Score: 0   PHQ-9 Total Score: 0        Review of Systems   Skin:        Skin tags          Medical History: has a past medical history of Allergic rhinitis (05/14/2015), Ankle pain, left, Arthralgia, Arthritis, Arthritis, rheumatoid (HCC), Arthropathy, Athlete's foot, Broken bones, Corns and callus, Fatigue, Heel pain (2000), Hemorrhoids, Hyperlipidemia, Ingrowing toenail (2014), Kidney calculus, Limb swelling, Plantar wart (1984), Primary osteoarthritis of left knee (05/01/2018), Renal calculus, and Seasonal allergies.     Surgical History: has a past surgical history that includes Colonoscopy (08/07/2019); Dilation and curettage of uterus; Dilation and curettage of uterus; Dilation and curettage of uterus; Elbow surgery; Hand surgery (Left); Other surgical history; Other surgical history; Tendon repair; Finger surgery; and Toe Nail Amputation (2014).     Family History: family history includes Arthritis in her brother, father, mother, sister, and son; Diabetes in her brother and sister; Diabetes type II in her brother and sister; Heart disease in her maternal grandfather and maternal grandmother; Osteoporosis in her mother.     Social History: reports that she has been smoking cigarettes. She has been smoking about 1.00 pack per day. She uses smokeless tobacco. She reports that she does not drink alcohol and does not use drugs.    Allergies: Penicillins, Amoxicillin, Cephalosporins, Codeine, Diphenhydramine, and Hydrocodone-acetaminophen      Health Maintenance Due   Topic Date Due   • COVID-19 Vaccine (1) Never done            Current Outpatient Medications:   •  Diclofenac Sodium (VOLTAREN) 1 % gel gel, Apply 4 g topically to the appropriate area as directed 4  "(Four) Times a Day As Needed (AS NEEDED)., Disp: 100 g, Rfl: 1  •  fexofenadine (ALLEGRA) 180 MG tablet, Take 180 mg by mouth Daily., Disp: , Rfl:   •  fluticasone (FLONASE) 50 MCG/ACT nasal spray, 2 sprays into the nostril(s) as directed by provider Daily., Disp: 16 g, Rfl: 5  •  gabapentin (NEURONTIN) 300 MG capsule, Take 1 capsule by mouth 2 (Two) Times a Day., Disp: 180 capsule, Rfl: 1  •  montelukast (SINGULAIR) 10 MG tablet, TAKE ONE TABLET BY MOUTH ONCE NIGHTLY, Disp: 90 tablet, Rfl: 1  •  escitalopram (Lexapro) 10 MG tablet, Take 1 tablet by mouth Daily., Disp: 30 tablet, Rfl: 1  •  hydrOXYzine (ATARAX) 25 MG tablet, Take 1 tablet by mouth 3 (Three) Times a Day As Needed for Itching for up to 30 days., Disp: 90 tablet, Rfl: 0      Immunization History   Administered Date(s) Administered   • Shingrix 11/17/2021, 11/24/2021, 04/22/2022   • Tdap 03/31/2016   • Tetanus Toxoid, Not Adsorbed 06/21/2009         Objective       Vitals:    08/05/22 1151   BP: 133/57   BP Location: Left arm   Patient Position: Sitting   Cuff Size: Adult   Pulse: 70   SpO2: 99%   Weight: 108 kg (239 lb)   Height: 162.6 cm (64\")      Body mass index is 41.02 kg/m².   Wt Readings from Last 3 Encounters:   08/05/22 108 kg (239 lb)   06/28/22 108 kg (237 lb)   06/17/22 108 kg (237 lb 3.2 oz)      BP Readings from Last 3 Encounters:   08/05/22 133/57   06/17/22 117/47   05/18/22 125/55        Physical Exam  Vitals reviewed.   Constitutional:       Appearance: Normal appearance. She is well-developed.   HENT:      Head: Normocephalic and atraumatic.   Eyes:      Conjunctiva/sclera: Conjunctivae normal.      Pupils: Pupils are equal, round, and reactive to light.   Cardiovascular:      Rate and Rhythm: Normal rate and regular rhythm.      Heart sounds: Normal heart sounds. No murmur heard.  Pulmonary:      Effort: Pulmonary effort is normal.      Breath sounds: Normal breath sounds. No wheezing or rhonchi.   Skin:     General: Skin is warm " and dry.      Comments: 2 skin tags in left axillary region.   Neurological:      Mental Status: She is alert and oriented to person, place, and time.   Psychiatric:         Mood and Affect: Mood and affect normal.         Behavior: Behavior normal.         Thought Content: Thought content normal.         Judgment: Judgment normal.             Result Review :       Common labs    Common Labsle 11/29/21 11/29/21 11/29/21 3/14/22 3/14/22    1215 1215 1215 1513 1513   Glucose  86  89    BUN  14  11    Creatinine  0.73  0.73    eGFR Non African Am  83      Sodium  137  138    Potassium  4.4  4.7    Chloride  101  101    Calcium  9.6  9.3    Albumin  4.40      Total Bilirubin  0.3      Alkaline Phosphatase  108      AST (SGOT)  17      ALT (SGPT)  13      WBC 8.20    11.12 (A)   Hemoglobin 14.1    13.8   Hematocrit 41.3    41.4   Platelets 275    297   Total Cholesterol   156     Triglycerides   144     HDL Cholesterol   34 (A)     LDL Cholesterol    96     (A) Abnormal value              Skin Tag Removal  Performed by: Chacha Hurst APRN  Authorized by: Chacha Hurst APRN   Preparation: Patient was prepped and draped in the usual sterile fashion.  Local anesthesia used: yes  Anesthesia: local infiltration    Anesthesia:  Local anesthesia used: yes  Local Anesthetic: lidocaine 1% with epinephrine  Anesthetic total: 2 mL    Sedation:  Patient sedated: no    Patient tolerance: patient tolerated the procedure well with no immediate complications  Comments: Both skin tags injected at base with lidocaine 1% with epi. Removed with scissors. Not sent to path. Bleeding controlled. Band-Aids applied to each site.                  Assessment and Plan        Diagnoses and all orders for this visit:    1. Skin tag (Primary)    Other orders  -     Skin Tag Removal    Keep wounds clean and dry. Apply antibacterial soap prn. Monitor for signs of infection such as increased redness or drainage.       Follow Up     Return if  symptoms worsen or fail to improve.    Patient was given instructions and counseling regarding her condition or for health maintenance advice. Please see specific information pulled into the AVS if appropriate.     ALYSSA Lowe

## 2022-08-18 ENCOUNTER — HOSPITAL ENCOUNTER (OUTPATIENT)
Dept: MAMMOGRAPHY | Facility: HOSPITAL | Age: 52
Discharge: HOME | End: 2022-08-18
Admitting: NURSE PRACTITIONER

## 2022-08-18 DIAGNOSIS — Z12.31 BREAST CANCER SCREENING BY MAMMOGRAM: ICD-10-CM

## 2022-08-18 PROCEDURE — 77063 BREAST TOMOSYNTHESIS BI: CPT

## 2022-08-18 PROCEDURE — 77067 SCR MAMMO BI INCL CAD: CPT

## 2022-10-04 ENCOUNTER — OFFICE VISIT (OUTPATIENT)
Dept: FAMILY MEDICINE CLINIC | Facility: CLINIC | Age: 52
End: 2022-10-04

## 2022-10-04 VITALS
SYSTOLIC BLOOD PRESSURE: 138 MMHG | BODY MASS INDEX: 40.63 KG/M2 | HEIGHT: 64 IN | DIASTOLIC BLOOD PRESSURE: 82 MMHG | OXYGEN SATURATION: 98 % | WEIGHT: 238 LBS | TEMPERATURE: 97.9 F | HEART RATE: 72 BPM

## 2022-10-04 DIAGNOSIS — M54.50 ACUTE BILATERAL LOW BACK PAIN WITHOUT SCIATICA: Primary | ICD-10-CM

## 2022-10-04 PROCEDURE — 99213 OFFICE O/P EST LOW 20 MIN: CPT | Performed by: FAMILY MEDICINE

## 2022-10-04 RX ORDER — TRAMADOL HYDROCHLORIDE 50 MG/1
50 TABLET ORAL EVERY 6 HOURS PRN
Qty: 30 TABLET | Refills: 0 | Status: SHIPPED | OUTPATIENT
Start: 2022-10-04 | End: 2023-03-03 | Stop reason: SDUPTHER

## 2022-10-04 NOTE — PROGRESS NOTES
Chief Complaint  Back Pain (1 week since beginning of flare up)    SUBJECTIVE  Slime Mcelroy presents to [facility]    Is 51 years old history of previous low back pain is take care of her grandchildren several she has to lift having pain for a week low back not radiating into any leg either leg and no leg weakness    PAST MEDICAL HISTORY  Allergies   Allergen Reactions   • Penicillins Hives   • Amoxicillin Hives   • Cephalosporins Hives   • Codeine Hives   • Diphenhydramine Hives   • Hydrocodone-Acetaminophen Hives        Past Surgical History:   • COLONOSCOPY    Mild diverticulosis of the left side of the colon, polyp (6mm to 8mm) in the proximal rectum (Polypectomy)   • DILATATION AND CURETTAGE   • DILATATION AND CURETTAGE   • DILATATION AND CURETTAGE   • ELBOW PROCEDURE   • FINGER SURGERY    with thumb   • HAND SURGERY   • OTHER SURGICAL HISTORY    Joint Surgery   • OTHER SURGICAL HISTORY    Laparoscopy   • TENDON REPAIR   • TOE NAIL AMPUTATION    Removal       Social History     Tobacco Use   • Smoking status: Current Every Day Smoker     Packs/day: 1.00     Types: Cigarettes   • Smokeless tobacco: Current User   Substance Use Topics   • Alcohol use: Never       Family History   Problem Relation Age of Onset   • Arthritis Mother    • Osteoporosis Mother    • Arthritis Father    • Diabetes Sister    • Diabetes type II Sister    • Arthritis Sister    • Diabetes Brother    • Diabetes type II Brother    • Arthritis Brother    • Heart disease Maternal Grandmother    • Heart disease Maternal Grandfather    • Arthritis Son    • Cancer Neg Hx    • Stroke Neg Hx         Health Maintenance Due   Topic Date Due   • COVID-19 Vaccine (1) Never done   • INFLUENZA VACCINE  Never done        Last Completed Colonoscopy          COLORECTAL CANCER SCREENING (COLONOSCOPY - Every 10 Years) Next due on 8/7/2029 08/07/2019  COLONOSCOPY (Done)                REVIEW OF SYSTEMS    Musculoskeletal  "gave her low back sheet discussed how to lift children with her arms close to you and not with extended arms also other positions for sitting and standing tramadol as previously helped her we will call in 30 tramadol but let her know that we only would use limited supply and only for a few days no history of trauma will need to start Tylenol arthritis 650 take 2 pills twice daily firm surface to sit watch how she lifted we will call in 30 tramadol    OBJECTIVE  Vitals:    10/04/22 1304   BP: 138/82   BP Location: Right arm   Patient Position: Sitting   Cuff Size: Adult   Pulse: 72   Temp: 97.9 °F (36.6 °C)   TempSrc: Temporal   SpO2: 98%   Weight: 108 kg (238 lb)   Height: 162.6 cm (64\")     Body mass index is 40.85 kg/m².    PHYSICAL EXAM    General no distress  Cardiovascular regular rhythm no murmur  Lungs clear and equal bilaterally  Musculoskeletal tender to palpation in the low back muscles bilaterally negative straight leg raise gait is normal no muscle weakness    ASSESSMENT & PLAN  There are no diagnoses linked to this encounter.      Musculoskeletal low back pain probably lifting grandchildren: 30 tramadol take Tylenol arthritis watch postures left carefully in the correct manner no x-rays were ordered            Patient was given instructions and counseling regarding her condition or for health maintenance advice. Please see specific information pulled into the AVS if appropriate.   "

## 2022-10-05 RX ORDER — ESCITALOPRAM OXALATE 10 MG/1
TABLET ORAL
Qty: 30 TABLET | Refills: 1 | Status: SHIPPED | OUTPATIENT
Start: 2022-10-05 | End: 2022-11-03

## 2022-10-21 ENCOUNTER — TELEPHONE (OUTPATIENT)
Dept: FAMILY MEDICINE CLINIC | Facility: CLINIC | Age: 52
End: 2022-10-21

## 2022-10-21 NOTE — TELEPHONE ENCOUNTER
Caller: [employer]    Relationship to patient: Other    Best call back number: [phone] OPT 3    Patient is needing: KRISTAN HAS COMPLETED HER HEALTH RISK ASSESSMENT AND CARE PLAN. ON AVAILABILITY TO PROVIDE A PORTAL.   OCT 27TH PROVIDER IS INVITED TO JOIN CALL TO TALK ABOUT THE PATIENTS CARE PLAN.

## 2022-11-03 ENCOUNTER — OFFICE VISIT (OUTPATIENT)
Dept: FAMILY MEDICINE CLINIC | Facility: CLINIC | Age: 52
End: 2022-11-03

## 2022-11-03 VITALS
BODY MASS INDEX: 40.89 KG/M2 | DIASTOLIC BLOOD PRESSURE: 55 MMHG | OXYGEN SATURATION: 97 % | WEIGHT: 238.2 LBS | HEART RATE: 73 BPM | TEMPERATURE: 97.5 F | SYSTOLIC BLOOD PRESSURE: 108 MMHG

## 2022-11-03 DIAGNOSIS — J02.9 SORE THROAT: ICD-10-CM

## 2022-11-03 DIAGNOSIS — J02.9 PHARYNGITIS, UNSPECIFIED ETIOLOGY: Primary | ICD-10-CM

## 2022-11-03 DIAGNOSIS — H61.22 IMPACTED CERUMEN OF LEFT EAR: ICD-10-CM

## 2022-11-03 PROCEDURE — 69210 REMOVE IMPACTED EAR WAX UNI: CPT | Performed by: NURSE PRACTITIONER

## 2022-11-03 PROCEDURE — 99213 OFFICE O/P EST LOW 20 MIN: CPT | Performed by: NURSE PRACTITIONER

## 2022-11-03 RX ORDER — AZITHROMYCIN 250 MG/1
TABLET, FILM COATED ORAL
Qty: 6 TABLET | Refills: 0 | Status: SHIPPED | OUTPATIENT
Start: 2022-11-03 | End: 2022-11-17

## 2022-11-03 NOTE — PROGRESS NOTES
Chief Complaint  Sore Throat (SINCE THIS MORNING) and Earache (RIGHT; SINCE THIS MORNING)    Subjective          Slime Mcelroy is a 51 y.o. female who presents to [facility]    History of Present Illness    Sore throat ongoing for 2 days. Pt reports throat and neck swollen. Right ear pain.          Review of Systems   Constitutional: Negative for fever.   HENT: Positive for ear pain and sore throat.    Respiratory: Negative for cough.    Neurological: Negative for dizziness.          Medical History: has a past medical history of Allergic rhinitis (05/14/2015), Ankle pain, left, Arthralgia, Arthritis, Arthritis, rheumatoid (HCC), Arthropathy, Athlete's foot, Broken bones, Corns and callus, Fatigue, Heel pain (2000), Hemorrhoids, Hyperlipidemia, Ingrowing toenail (2014), Kidney calculus, Limb swelling, Plantar wart (1984), Primary osteoarthritis of left knee (05/01/2018), Renal calculus, and Seasonal allergies.     Surgical History: has a past surgical history that includes Colonoscopy (08/07/2019); Dilation and curettage of uterus; Dilation and curettage of uterus; Dilation and curettage of uterus; Elbow surgery; Hand surgery (Left); Other surgical history; Other surgical history; Tendon repair; Finger surgery; and Toe Nail Amputation (2014).     Family History: family history includes Arthritis in her brother, father, mother, sister, and son; Diabetes in her brother and sister; Diabetes type II in her brother and sister; Heart disease in her maternal grandfather and maternal grandmother; Osteoporosis in her mother.     Social History: reports that she has been smoking cigarettes. She has been smoking an average of 1 pack per day. She uses smokeless tobacco. She reports that she does not drink alcohol and does not use drugs.    Allergies: Penicillins, Amoxicillin, Cephalosporins, Codeine, Diphenhydramine, and Hydrocodone-acetaminophen      Health Maintenance Due   Topic Date Due    • COVID-19 Vaccine (1) Never done   • INFLUENZA VACCINE  Never done            Current Outpatient Medications:   •  Diclofenac Sodium (VOLTAREN) 1 % gel gel, Apply 4 g topically to the appropriate area as directed 4 (Four) Times a Day As Needed (AS NEEDED)., Disp: 100 g, Rfl: 1  •  fexofenadine (ALLEGRA) 180 MG tablet, Take 180 mg by mouth Daily., Disp: , Rfl:   •  fluticasone (FLONASE) 50 MCG/ACT nasal spray, 2 sprays into the nostril(s) as directed by provider Daily., Disp: 16 g, Rfl: 5  •  gabapentin (NEURONTIN) 300 MG capsule, Take 1 capsule by mouth 2 (Two) Times a Day., Disp: 180 capsule, Rfl: 1  •  montelukast (SINGULAIR) 10 MG tablet, TAKE ONE TABLET BY MOUTH ONCE NIGHTLY, Disp: 90 tablet, Rfl: 1  •  azithromycin (Zithromax) 250 MG tablet, 2 tabs on day one and 1 tab day 2-5., Disp: 6 tablet, Rfl: 0  •  escitalopram (LEXAPRO) 10 MG tablet, TAKE ONE TABLET BY MOUTH DAILY, Disp: 30 tablet, Rfl: 1  •  traMADol (ULTRAM) 50 MG tablet, Take 1 tablet by mouth Every 6 (Six) Hours As Needed for Moderate Pain., Disp: 30 tablet, Rfl: 0      Immunization History   Administered Date(s) Administered   • Shingrix 11/17/2021, 11/24/2021, 04/22/2022   • Tdap 03/31/2016   • Tetanus Toxoid, Not Adsorbed 06/21/2009         Objective       Vitals:    11/03/22 0943   BP: 108/55   Pulse: 73   Temp: 97.5 °F (36.4 °C)   TempSrc: Temporal   SpO2: 97%   Weight: 108 kg (238 lb 3.2 oz)      Body mass index is 40.89 kg/m².   Wt Readings from Last 3 Encounters:   11/03/22 108 kg (238 lb 3.2 oz)   10/04/22 108 kg (238 lb)   08/05/22 108 kg (239 lb)      BP Readings from Last 3 Encounters:   11/03/22 108/55   10/04/22 138/82   08/05/22 133/57        Physical Exam  Constitutional:       Appearance: She is well-developed. She is not ill-appearing or toxic-appearing.   HENT:      Head: Normocephalic and atraumatic.      Right Ear: Hearing, tympanic membrane, ear canal and external ear normal. No middle ear effusion. There is no impacted  cerumen.      Left Ear: Hearing, tympanic membrane, ear canal and external ear normal. There is impacted cerumen.      Ears:      Comments: After cerumen removal - fluid air bubble noted Left tm. Clear fluid.      Nose: No nasal deformity, mucosal edema, congestion or rhinorrhea.      Mouth/Throat:      Dentition: Normal dentition.      Pharynx: No oropharyngeal exudate or posterior oropharyngeal erythema.      Tonsils: No tonsillar abscesses.   Cardiovascular:      Rate and Rhythm: Normal rate and regular rhythm.   Pulmonary:      Effort: No tachypnea, bradypnea or respiratory distress.      Breath sounds: Normal breath sounds. No wheezing or rhonchi.   Skin:     General: Skin is warm and dry.   Neurological:      Mental Status: She is oriented to person, place, and time.   Psychiatric:         Mood and Affect: Mood normal.         Behavior: Behavior normal.         Thought Content: Thought content normal.         Judgment: Judgment normal.             Result Review :       Common labs    Common Labs 11/29/21 11/29/21 11/29/21 3/14/22 3/14/22    1215 1215 1215 1513 1513   Glucose  86  89    BUN  14  11    Creatinine  0.73  0.73    eGFR Non African Am  83      Sodium  137  138    Potassium  4.4  4.7    Chloride  101  101    Calcium  9.6  9.3    Albumin  4.40      Total Bilirubin  0.3      Alkaline Phosphatase  108      AST (SGOT)  17      ALT (SGPT)  13      WBC 8.20    11.12 (A)   Hemoglobin 14.1    13.8   Hematocrit 41.3    41.4   Platelets 275    297   Total Cholesterol   156     Triglycerides   144     HDL Cholesterol   34 (A)     LDL Cholesterol    96     (A) Abnormal value                  Ear Cerumen Removal    Date/Time: 11/3/2022 9:54 AM  Performed by: Chacha Hurst APRN  Authorized by: Chacha Hurst APRN     Anesthesia:  Local Anesthetic: none  Location details: left ear  Patient tolerance: patient tolerated the procedure well with no immediate complications  Comments: Left ear cerumen removed with  curette.   Procedure type: instrumentation, curette   Sedation:  Patient sedated: no                  Assessment and Plan        Diagnoses and all orders for this visit:    1. Pharyngitis, unspecified etiology (Primary)  -     azithromycin (Zithromax) 250 MG tablet; 2 tabs on day one and 1 tab day 2-5.  Dispense: 6 tablet; Refill: 0    2. Sore throat  -     Cancel: POCT rapid strep A    3. Impacted cerumen of left ear  -     Ear Cerumen Removal          Follow Up     Return if symptoms worsen or fail to improve.    Patient was given instructions and counseling regarding her condition or for health maintenance advice. Please see specific information pulled into the AVS if appropriate.     LAYSSA Lowe

## 2022-11-17 ENCOUNTER — OFFICE VISIT (OUTPATIENT)
Dept: FAMILY MEDICINE CLINIC | Facility: CLINIC | Age: 52
End: 2022-11-17

## 2022-11-17 VITALS
SYSTOLIC BLOOD PRESSURE: 119 MMHG | OXYGEN SATURATION: 99 % | HEART RATE: 64 BPM | BODY MASS INDEX: 40.34 KG/M2 | WEIGHT: 235 LBS | DIASTOLIC BLOOD PRESSURE: 52 MMHG | TEMPERATURE: 97.2 F

## 2022-11-17 DIAGNOSIS — J01.20 ACUTE NON-RECURRENT ETHMOIDAL SINUSITIS: Primary | ICD-10-CM

## 2022-11-17 PROCEDURE — 99213 OFFICE O/P EST LOW 20 MIN: CPT | Performed by: NURSE PRACTITIONER

## 2022-11-17 RX ORDER — AZITHROMYCIN 250 MG/1
TABLET, FILM COATED ORAL
Qty: 6 TABLET | Refills: 0 | Status: SHIPPED | OUTPATIENT
Start: 2022-11-17 | End: 2023-03-03

## 2022-11-17 NOTE — PROGRESS NOTES
Chief Complaint  Hyperlipidemia    Subjective          Slime Mcelroy is a 52 y.o. female who presents to [facility]    History of Present Illness    Onset with facial pressure and congestion on Sunday.  Patient denies any fever or unusual body aches.  Clear nasal drainage.  Nonproductive cough.  Patient has taken over-the-counter Advil cold and sinus with some relief but no resolution.    Patient did get better with previous antibiotic however being around the grandkids they were sick and she got sick again recently.     Review of Systems   Constitutional: Negative for chills, fatigue and fever.   HENT: Positive for congestion, ear pain, postnasal drip, sinus pressure and sinus pain. Negative for drooling, ear discharge, facial swelling, mouth sores, rhinorrhea, sneezing, sore throat and trouble swallowing.    Respiratory: Positive for cough. Negative for shortness of breath.    Cardiovascular: Negative for chest pain and palpitations.   Gastrointestinal: Negative for constipation, diarrhea, nausea and vomiting.   Musculoskeletal: Negative for back pain and neck pain.   Skin: Negative for rash.   Allergic/Immunologic: Positive for environmental allergies.   Neurological: Negative for dizziness and headaches.           Medical History: has a past medical history of Allergic rhinitis (05/14/2015), Ankle pain, left, Arthralgia, Arthritis, Arthritis, rheumatoid (HCC), Arthropathy, Athlete's foot, Broken bones, Corns and callus, Fatigue, Heel pain (2000), Hemorrhoids, Hyperlipidemia, Ingrowing toenail (2014), Kidney calculus, Limb swelling, Plantar wart (1984), Primary osteoarthritis of left knee (05/01/2018), Renal calculus, and Seasonal allergies.     Surgical History: has a past surgical history that includes Colonoscopy (08/07/2019); Dilation and curettage of uterus; Dilation and curettage of uterus; Dilation and curettage of uterus; Elbow surgery; Hand surgery (Left); Other  surgical history; Other surgical history; Tendon repair; Finger surgery; and Toe Nail Amputation (2014).     Family History: family history includes Arthritis in her brother, father, mother, sister, and son; Diabetes in her brother and sister; Diabetes type II in her brother and sister; Heart disease in her maternal grandfather and maternal grandmother; Osteoporosis in her mother.     Social History: reports that she has been smoking cigarettes. She has been smoking an average of 1 pack per day. She uses smokeless tobacco. She reports that she does not drink alcohol and does not use drugs.    Allergies: Penicillins, Amoxicillin, Cephalosporins, Codeine, Diphenhydramine, and Hydrocodone-acetaminophen      Health Maintenance Due   Topic Date Due   • COVID-19 Vaccine (1) Never done            Current Outpatient Medications:   •  Diclofenac Sodium (VOLTAREN) 1 % gel gel, Apply 4 g topically to the appropriate area as directed 4 (Four) Times a Day As Needed (AS NEEDED)., Disp: 100 g, Rfl: 1  •  fexofenadine (ALLEGRA) 180 MG tablet, Take 180 mg by mouth Daily., Disp: , Rfl:   •  fluticasone (FLONASE) 50 MCG/ACT nasal spray, 2 sprays into the nostril(s) as directed by provider Daily., Disp: 16 g, Rfl: 5  •  gabapentin (NEURONTIN) 300 MG capsule, Take 1 capsule by mouth 2 (Two) Times a Day., Disp: 180 capsule, Rfl: 1  •  montelukast (SINGULAIR) 10 MG tablet, TAKE ONE TABLET BY MOUTH ONCE NIGHTLY, Disp: 90 tablet, Rfl: 1  •  azithromycin (Zithromax) 250 MG tablet, 2 tabs on day one and 1 tab day 2-5., Disp: 6 tablet, Rfl: 0  •  traMADol (ULTRAM) 50 MG tablet, Take 1 tablet by mouth Every 6 (Six) Hours As Needed for Moderate Pain., Disp: 30 tablet, Rfl: 0      Immunization History   Administered Date(s) Administered   • Shingrix 11/17/2021, 11/24/2021, 04/22/2022   • Tdap 03/31/2016   • Tetanus Toxoid, Not Adsorbed 06/21/2009         Objective       Vitals:    11/17/22 0822   BP: 119/52   Pulse: 64   Temp: 97.2 °F (36.2 °C)    TempSrc: Temporal   SpO2: 99%   Weight: 107 kg (235 lb)      Body mass index is 40.34 kg/m².   Wt Readings from Last 3 Encounters:   11/17/22 107 kg (235 lb)   11/03/22 108 kg (238 lb 3.2 oz)   10/04/22 108 kg (238 lb)      BP Readings from Last 3 Encounters:   11/17/22 119/52   11/03/22 108/55   10/04/22 138/82        Physical Exam  Vitals reviewed.   Constitutional:       Appearance: She is well-developed. She is not ill-appearing or toxic-appearing.   HENT:      Head: Normocephalic and atraumatic.      Right Ear: Hearing, ear canal and external ear normal. A middle ear effusion is present.      Left Ear: Hearing, ear canal and external ear normal. A middle ear effusion is present.      Nose: No nasal deformity, mucosal edema, congestion or rhinorrhea.      Mouth/Throat:      Dentition: Normal dentition.      Pharynx: Posterior oropharyngeal erythema (Mild) present. No oropharyngeal exudate.      Tonsils: No tonsillar abscesses.   Cardiovascular:      Rate and Rhythm: Normal rate and regular rhythm.   Pulmonary:      Effort: Pulmonary effort is normal. No tachypnea, bradypnea or respiratory distress.      Breath sounds: Normal breath sounds. No wheezing or rhonchi.   Skin:     General: Skin is warm and dry.   Neurological:      General: No focal deficit present.      Mental Status: She is oriented to person, place, and time.   Psychiatric:         Mood and Affect: Mood normal.         Behavior: Behavior normal.         Thought Content: Thought content normal.         Judgment: Judgment normal.             Result Review :       Common labs    Common Labs 11/29/21 11/29/21 11/29/21 3/14/22 3/14/22    1215 1215 1215 1513 1513   Glucose  86  89    BUN  14  11    Creatinine  0.73  0.73    eGFR Non African Am  83      Sodium  137  138    Potassium  4.4  4.7    Chloride  101  101    Calcium  9.6  9.3    Albumin  4.40      Total Bilirubin  0.3      Alkaline Phosphatase  108      AST (SGOT)  17      ALT (SGPT)  13       WBC 8.20    11.12 (A)   Hemoglobin 14.1    13.8   Hematocrit 41.3    41.4   Platelets 275    297   Total Cholesterol   156     Triglycerides   144     HDL Cholesterol   34 (A)     LDL Cholesterol    96     (A) Abnormal value                             Assessment and Plan        Diagnoses and all orders for this visit:    1. Acute non-recurrent ethmoidal sinusitis (Primary)  -     azithromycin (Zithromax) 250 MG tablet; 2 tabs on day one and 1 tab day 2-5.  Dispense: 6 tablet; Refill: 0          Follow Up     Return if symptoms worsen or fail to improve.    Patient was given instructions and counseling regarding her condition or for health maintenance advice. Please see specific information pulled into the AVS if appropriate.     ALYSSA Lowe

## 2023-01-17 DIAGNOSIS — J30.2 SEASONAL ALLERGIES: ICD-10-CM

## 2023-01-17 RX ORDER — MONTELUKAST SODIUM 10 MG/1
TABLET ORAL
Qty: 90 TABLET | Refills: 1 | Status: SHIPPED | OUTPATIENT
Start: 2023-01-17 | End: 2023-03-03 | Stop reason: SDUPTHER

## 2023-01-22 DIAGNOSIS — M54.32 SCIATICA OF LEFT SIDE: ICD-10-CM

## 2023-01-23 RX ORDER — GABAPENTIN 300 MG/1
CAPSULE ORAL
Qty: 180 CAPSULE | Refills: 1 | Status: SHIPPED | OUTPATIENT
Start: 2023-01-23 | End: 2023-03-03 | Stop reason: SDUPTHER

## 2023-02-13 DIAGNOSIS — J30.2 SEASONAL ALLERGIES: ICD-10-CM

## 2023-02-13 RX ORDER — FLUTICASONE PROPIONATE 50 MCG
SPRAY, SUSPENSION (ML) NASAL
Qty: 16 ML | Refills: 1 | Status: SHIPPED | OUTPATIENT
Start: 2023-02-13 | End: 2023-03-03 | Stop reason: SDUPTHER

## 2023-03-03 ENCOUNTER — OFFICE VISIT (OUTPATIENT)
Dept: FAMILY MEDICINE CLINIC | Facility: CLINIC | Age: 53
End: 2023-03-03
Payer: MEDICARE

## 2023-03-03 VITALS
DIASTOLIC BLOOD PRESSURE: 66 MMHG | OXYGEN SATURATION: 98 % | BODY MASS INDEX: 39.79 KG/M2 | SYSTOLIC BLOOD PRESSURE: 109 MMHG | HEART RATE: 74 BPM | TEMPERATURE: 96.2 F | WEIGHT: 231.8 LBS

## 2023-03-03 DIAGNOSIS — G89.29 CHRONIC LEFT-SIDED LOW BACK PAIN WITH LEFT-SIDED SCIATICA: Primary | ICD-10-CM

## 2023-03-03 DIAGNOSIS — Z79.899 LONG-TERM USE OF HIGH-RISK MEDICATION: ICD-10-CM

## 2023-03-03 DIAGNOSIS — M54.32 SCIATICA OF LEFT SIDE: ICD-10-CM

## 2023-03-03 DIAGNOSIS — M54.42 CHRONIC LEFT-SIDED LOW BACK PAIN WITH LEFT-SIDED SCIATICA: Primary | ICD-10-CM

## 2023-03-03 DIAGNOSIS — J30.2 SEASONAL ALLERGIES: ICD-10-CM

## 2023-03-03 DIAGNOSIS — Z12.31 ENCOUNTER FOR SCREENING MAMMOGRAM FOR MALIGNANT NEOPLASM OF BREAST: ICD-10-CM

## 2023-03-03 PROCEDURE — 80305 DRUG TEST PRSMV DIR OPT OBS: CPT | Performed by: NURSE PRACTITIONER

## 2023-03-03 PROCEDURE — 99213 OFFICE O/P EST LOW 20 MIN: CPT | Performed by: NURSE PRACTITIONER

## 2023-03-03 RX ORDER — GABAPENTIN 300 MG/1
300 CAPSULE ORAL 2 TIMES DAILY
Qty: 180 CAPSULE | Refills: 1 | Status: SHIPPED | OUTPATIENT
Start: 2023-03-03

## 2023-03-03 RX ORDER — TRAMADOL HYDROCHLORIDE 50 MG/1
50 TABLET ORAL EVERY 6 HOURS PRN
Qty: 30 TABLET | Refills: 0 | Status: SHIPPED | OUTPATIENT
Start: 2023-03-03

## 2023-03-03 RX ORDER — FLUTICASONE PROPIONATE 50 MCG
2 SPRAY, SUSPENSION (ML) NASAL DAILY
Qty: 16 ML | Refills: 5 | Status: SHIPPED | OUTPATIENT
Start: 2023-03-03

## 2023-03-03 RX ORDER — MONTELUKAST SODIUM 10 MG/1
10 TABLET ORAL NIGHTLY
Qty: 90 TABLET | Refills: 1 | Status: SHIPPED | OUTPATIENT
Start: 2023-03-03

## 2023-04-25 NOTE — PROGRESS NOTES
Chief Complaint  Hyperlipidemia (Follow up) and Neuropathy    Subjective          Slime Mcelroy is a 52 y.o. female who presents to [facility]    History of Present Illness    Hyperlipidemia last labs in May were within normal limits.  Patient continues to work on diet modifications.  Patient denies any particular exercise regimen.    Sciatica of the left lower extremity patient takes gabapentin as directed patient reports this helps her complete her activities of daily living.  Patient denies any medication side effects.      PHQ-2 Total Score: 0   PHQ-9 Total Score: 0        Review of Systems   Constitutional:  Negative for fever.   Respiratory:  Negative for chest tightness.    Cardiovascular:  Negative for chest pain.   Musculoskeletal:  Positive for back pain.   Neurological:  Positive for numbness.        Medical History: has a past medical history of Allergic rhinitis (05/14/2015), Ankle pain, left, Arthralgia, Arthritis, Arthritis, rheumatoid, Arthropathy, Athlete's foot, Broken bones, Corns and callus, Fatigue, Heel pain (2000), Hemorrhoids, Hyperlipidemia, Ingrowing toenail (2014), Kidney calculus, Limb swelling, Plantar wart (1984), Primary osteoarthritis of left knee (05/01/2018), Renal calculus, and Seasonal allergies.     Surgical History: has a past surgical history that includes Colonoscopy (08/07/2019); Dilation and curettage of uterus; Dilation and curettage of uterus; Dilation and curettage of uterus; Elbow surgery; Hand surgery (Left); Other surgical history; Other surgical history; Tendon repair; Finger surgery; and Toe Nail Amputation (2014).     Family History: family history includes Arthritis in her brother, father, mother, sister, and son; Diabetes in her brother and sister; Diabetes type II in her brother and sister; Heart disease in her maternal grandfather and maternal grandmother; Osteoporosis in her mother.     Social History: reports that she has  been smoking cigarettes. She has been smoking an average of 1 pack per day. She uses smokeless tobacco. She reports that she does not drink alcohol and does not use drugs.    Allergies: Penicillins, Amoxicillin, Cephalosporins, Codeine, Diphenhydramine, and Hydrocodone-acetaminophen      There are no preventive care reminders to display for this patient.           Current Outpatient Medications:     Diclofenac Sodium (VOLTAREN) 1 % gel gel, Apply 4 g topically to the appropriate area as directed 4 (Four) Times a Day As Needed (AS NEEDED)., Disp: 100 g, Rfl: 1    fexofenadine (ALLEGRA) 180 MG tablet, Take 1 tablet by mouth Daily., Disp: , Rfl:     fluticasone (FLONASE) 50 MCG/ACT nasal spray, 2 sprays by Each Nare route Daily., Disp: 16 mL, Rfl: 5    gabapentin (NEURONTIN) 300 MG capsule, Take 1 capsule by mouth 3 (Three) Times a Day., Disp: 180 capsule, Rfl: 1    montelukast (SINGULAIR) 10 MG tablet, TAKE ONE TABLET BY MOUTH ONCE NIGHTLY, Disp: 90 tablet, Rfl: 1    nystatin-triamcinolone (MYCOLOG) 436516-0.1 UNIT/GM-% ointment, Apply 1 application  topically to the appropriate area as directed 2 (Two) Times a Day., Disp: 60 g, Rfl: 0    traMADol (ULTRAM) 50 MG tablet, Take 1 tablet by mouth Every 6 (Six) Hours As Needed for Moderate Pain., Disp: 30 tablet, Rfl: 0      Immunization History   Administered Date(s) Administered    Shingrix 11/17/2021, 11/24/2021, 04/22/2022    Tdap 03/31/2016    Tetanus Toxoid, Not Adsorbed 06/21/2009         Objective       Vitals:    08/24/23 0730   BP: 125/63   Pulse: 66   Temp: 97 øF (36.1 øC)   TempSrc: Temporal   SpO2: 96%   Weight: 105 kg (232 lb 6.4 oz)      Body mass index is 39.89 kg/mý.   Wt Readings from Last 3 Encounters:   08/24/23 105 kg (232 lb 6.4 oz)   07/19/23 107 kg (236 lb)   05/11/23 105 kg (230 lb 6.4 oz)      BP Readings from Last 3 Encounters:   08/24/23 125/63   07/19/23 124/76   05/11/23 117/81        Physical Exam  Vitals reviewed.   Constitutional:        Appearance: Normal appearance. She is well-developed.   HENT:      Head: Normocephalic and atraumatic.   Eyes:      Conjunctiva/sclera: Conjunctivae normal.      Pupils: Pupils are equal, round, and reactive to light.   Cardiovascular:      Rate and Rhythm: Normal rate and regular rhythm.      Heart sounds: Normal heart sounds. No murmur heard.  Pulmonary:      Effort: Pulmonary effort is normal.      Breath sounds: Normal breath sounds. No wheezing or rhonchi.   Abdominal:      General: Bowel sounds are normal. There is no distension.      Palpations: Abdomen is soft.      Tenderness: There is no abdominal tenderness.   Skin:     General: Skin is warm and dry.   Neurological:      Mental Status: She is alert and oriented to person, place, and time.   Psychiatric:         Mood and Affect: Mood and affect normal.         Behavior: Behavior normal.         Thought Content: Thought content normal.         Judgment: Judgment normal.           Result Review :       Common labs          5/11/2023    09:27   Common Labs   Glucose 92    BUN 12    Creatinine 0.86    Sodium 141    Potassium 4.9    Chloride 105    Calcium 9.4    Albumin 4.3    Total Bilirubin 0.3    Alkaline Phosphatase 106    AST (SGOT) 19    ALT (SGPT) 12    WBC 6.83    Hemoglobin 14.6    Hematocrit 42.5    Platelets 270    Total Cholesterol 160    Triglycerides 128    HDL Cholesterol 35    LDL Cholesterol  102                       Assessment and Plan        Diagnoses and all orders for this visit:    1. Hyperlipidemia, unspecified hyperlipidemia type (Primary)    2. Sciatica of left side  Comments:  Continue neuropathy as directed.      Obtained a written consent for IN[employer]A query. Discussed the risks and benefits of the use of controlled substances with the patient, including the risk of tolerance and drug dependence.  The patient has been counseled on the need to have an exit strategy, including potentially discontinuing the use of controlled substances.   [employer]IRA has or will be reviewed as soon as it becomes available.    Follow Up     Return in about 6 months (around 2/24/2024) for Next scheduled follow up.    Patient was given instructions and counseling regarding her condition or for health maintenance advice. Please see specific information pulled into the AVS if appropriate.     ALYSSA Lowe

## 2023-05-08 NOTE — PROGRESS NOTES
The ABCs of the Annual Wellness Visit  Subsequent Medicare Wellness Visit    Chief Complaint   Patient presents with   • Medicare Wellness-subsequent      Subjective    History of Present Illness:  Slime Mcelroy is a 52 y.o. female who presents for a Subsequent Medicare Wellness Visit.    The following portions of the patient's history were reviewed and   updated as appropriate: allergies, current medications, past family history, past medical history, past social history, past surgical history and problem list.    Compared to one year ago, the patient feels her physical   health is the same.    Compared to one year ago, the patient feels her mental   health is better.    Recent Hospitalizations:  She was not admitted to the hospital during the last year.       Current Medical Providers:  Patient Care Team:  Chacha Hurst APRN as PCP - General (Nurse Practitioner)    Outpatient Medications Prior to Visit   Medication Sig Dispense Refill   • Diclofenac Sodium (VOLTAREN) 1 % gel gel Apply 4 g topically to the appropriate area as directed 4 (Four) Times a Day As Needed (AS NEEDED). 100 g 1   • fexofenadine (ALLEGRA) 180 MG tablet Take 1 tablet by mouth Daily.     • fluticasone (FLONASE) 50 MCG/ACT nasal spray 2 sprays by Each Nare route Daily. 16 mL 5   • gabapentin (NEURONTIN) 300 MG capsule Take 1 capsule by mouth 2 (Two) Times a Day. 180 capsule 1   • montelukast (SINGULAIR) 10 MG tablet Take 1 tablet by mouth Every Night. 90 tablet 1   • nitrofurantoin, macrocrystal-monohydrate, (MACROBID) 100 MG capsule      • traMADol (ULTRAM) 50 MG tablet Take 1 tablet by mouth Every 6 (Six) Hours As Needed for Moderate Pain. 30 tablet 0     No facility-administered medications prior to visit.       Opioid medication/s are on active medication list.  and I have evaluated her active treatment plan and pain score trends (see table).  There were no vitals filed for this visit.  I have reviewed the chart for potential of high  "risk medication and harmful drug interactions in the elderly.            Aspirin is not on active medication list.  Aspirin use is not indicated based on review of current medical condition/s. Risk of harm outweighs potential benefits.  .    Patient Active Problem List   Diagnosis   • Hyperlipidemia   • Sciatica of left side   • Seasonal allergies   • Corns and callus   • Ankle pain, left   • Arthralgia   • Arthropathy, unspecified   • Broken bones   • Calculus of kidney   • Fatigue   • Ingrowing toenail   • Limb swelling   • Osteoarthritis of knee   • Plantar wart   • Seasonal allergic rhinitis   • Psoriatic arthritis     Advance Care Planning  Advance Directive is on file.  ACP discussion was held with the patient during this visit. Patient has an advance directive in EMR which is still valid.           Objective    Vitals:    05/11/23 0853   BP: 117/81   Pulse: 70   Temp: 98.2 °F (36.8 °C)   TempSrc: Temporal   SpO2: 96%   Weight: 105 kg (230 lb 6.4 oz)     Estimated body mass index is 39.55 kg/m² as calculated from the following:    Height as of 10/4/22: 162.6 cm (64\").    Weight as of this encounter: 105 kg (230 lb 6.4 oz).    Class 2 Severe Obesity (BMI >=35 and <=39.9). Obesity-related health conditions include the following: none. Obesity is unchanged. BMI is is above average; BMI management plan is completed. We discussed portion control and increasing exercise.      Does the patient have evidence of cognitive impairment? No    Physical Exam  Vitals reviewed.   Constitutional:       Appearance: Normal appearance. She is well-developed.   HENT:      Head: Normocephalic and atraumatic.   Eyes:      Conjunctiva/sclera: Conjunctivae normal.      Pupils: Pupils are equal, round, and reactive to light.   Cardiovascular:      Rate and Rhythm: Normal rate and regular rhythm.      Heart sounds: Normal heart sounds. No murmur heard.  Pulmonary:      Effort: Pulmonary effort is normal.      Breath sounds: Normal " breath sounds. No wheezing or rhonchi.   Abdominal:      General: Bowel sounds are normal. There is no distension.      Palpations: Abdomen is soft.      Tenderness: There is no abdominal tenderness.   Skin:     General: Skin is warm and dry.   Neurological:      Mental Status: She is alert and oriented to person, place, and time.   Psychiatric:         Mood and Affect: Mood and affect normal.         Behavior: Behavior normal.         Thought Content: Thought content normal.         Judgment: Judgment normal.                 HEALTH RISK ASSESSMENT    Smoking Status:  Social History     Tobacco Use   Smoking Status Every Day   • Packs/day: 1.00   • Types: Cigarettes   Smokeless Tobacco Current     Alcohol Consumption:  Social History     Substance and Sexual Activity   Alcohol Use Never     Fall Risk Screen:    [facility] Fall Risk Assessment was completed, and patient is at LOW risk for falls.Assessment completed on:2023    Depression Screenin/11/2023     8:57 AM   PHQ-2/PHQ-9 Depression Screening   Little Interest or Pleasure in Doing Things 0-->not at all   Feeling Down, Depressed or Hopeless 0-->not at all   PHQ-9: Brief Depression Severity Measure Score 0       Health Habits and Functional and Cognitive Screenin/11/2023     8:55 AM   Functional & Cognitive Status   Do you have difficulty preparing food and eating? No   Do you have difficulty bathing yourself, getting dressed or grooming yourself? No   Do you have difficulty using the toilet? No   Do you have difficulty moving around from place to place? No   Do you have trouble with steps or getting out of a bed or a chair? No   Current Diet Unhealthy Diet   Dental Exam Not up to date   Eye Exam Not up to date   Exercise (times per week) 0 times per week   Current Exercises Include No Regular Exercise   Do you need help using the phone?  No   Are you deaf or do you have serious difficulty hearing?  No   Do you need help with transportation?  No   Do you need help shopping? No   Do you need help preparing meals?  No   Do you need help with housework?  No   Do you need help with laundry? No   Do you need help taking your medications? No   Do you need help managing money? No   Do you ever drive or ride in a car without wearing a seat belt? No   Have you felt unusual stress, anger or loneliness in the last month? No   Who do you live with? Child   If you need help, do you have trouble finding someone available to you? No   Have you been bothered in the last four weeks by sexual problems? No   Do you have difficulty concentrating, remembering or making decisions? No       Age-appropriate Screening Schedule:  Refer to the list below for future screening recommendations based on patient's age, sex and/or medical conditions. Orders for these recommended tests are listed in the plan section. The patient has been provided with a written plan.    Health Maintenance   Topic Date Due   • HEPATITIS C SCREENING  Never done   • LIPID PANEL  11/29/2022   • INFLUENZA VACCINE  08/01/2023   • ANNUAL WELLNESS VISIT  05/11/2024   • MAMMOGRAM  08/18/2024   • PAP SMEAR  12/08/2024   • TDAP/TD VACCINES (2 - Td or Tdap) 03/31/2026   • COLORECTAL CANCER SCREENING  08/07/2029   • ZOSTER VACCINE  Completed   • COVID-19 Vaccine  Discontinued   • Pneumococcal Vaccine 0-64  Discontinued              Assessment & Plan   CMS Preventative Services Quick Reference  Risk Factors Identified During Encounter  Dental Screening Recommended  Vision Screening Recommended  The above risks/problems have been discussed with the patient.  Follow up actions/plans if indicated are seen below in the Assessment/Plan Section.  Pertinent information has been shared with the patient in the After Visit Summary.    Diagnoses and all orders for this visit:    1. Medicare annual wellness visit, subsequent (Primary)    2. Hyperlipidemia, unspecified hyperlipidemia type  -     Lipid Panel  -     Comprehensive  Metabolic Panel  -     CBC (No Diff)    3. Screening for thyroid disorder  -     TSH    4. Need for hepatitis C screening test  -     Hepatitis C Antibody; Future        Follow Up:   Return in about 6 months (around 11/11/2023) for Keep follow up as scheduled.     An After Visit Summary and PPPS were made available to the patient.    Updated annual wellness visit checklist.  Immunizations discussed.  Screening discussed and/or ordered.  Recommend yearly dental and eye exams. Also discussed monitoring of blood pressure and lipids.      ALYSSA Lowe

## 2023-05-11 ENCOUNTER — OFFICE VISIT (OUTPATIENT)
Dept: FAMILY MEDICINE CLINIC | Facility: CLINIC | Age: 53
End: 2023-05-11
Payer: MEDICAID

## 2023-05-11 VITALS
TEMPERATURE: 98.2 F | BODY MASS INDEX: 39.55 KG/M2 | HEART RATE: 70 BPM | DIASTOLIC BLOOD PRESSURE: 81 MMHG | SYSTOLIC BLOOD PRESSURE: 117 MMHG | WEIGHT: 230.4 LBS | OXYGEN SATURATION: 96 %

## 2023-05-11 DIAGNOSIS — Z13.29 SCREENING FOR THYROID DISORDER: ICD-10-CM

## 2023-05-11 DIAGNOSIS — Z11.59 NEED FOR HEPATITIS C SCREENING TEST: ICD-10-CM

## 2023-05-11 DIAGNOSIS — E78.5 HYPERLIPIDEMIA, UNSPECIFIED HYPERLIPIDEMIA TYPE: ICD-10-CM

## 2023-05-11 DIAGNOSIS — Z00.00 MEDICARE ANNUAL WELLNESS VISIT, SUBSEQUENT: Primary | ICD-10-CM

## 2023-05-11 LAB
ALBUMIN SERPL-MCNC: 4.3 G/DL (ref 3.5–5.2)
ALBUMIN/GLOB SERPL: 1.2 G/DL
ALP SERPL-CCNC: 106 U/L (ref 39–117)
ALT SERPL W P-5'-P-CCNC: 12 U/L (ref 1–33)
ANION GAP SERPL CALCULATED.3IONS-SCNC: 9 MMOL/L (ref 5–15)
AST SERPL-CCNC: 19 U/L (ref 1–32)
BILIRUB SERPL-MCNC: 0.3 MG/DL (ref 0–1.2)
BUN SERPL-MCNC: 12 MG/DL (ref 6–20)
BUN/CREAT SERPL: 14 (ref 7–25)
CALCIUM SPEC-SCNC: 9.4 MG/DL (ref 8.6–10.5)
CHLORIDE SERPL-SCNC: 105 MMOL/L (ref 98–107)
CHOLEST SERPL-MCNC: 160 MG/DL (ref 0–200)
CO2 SERPL-SCNC: 27 MMOL/L (ref 22–29)
CREAT SERPL-MCNC: 0.86 MG/DL (ref 0.57–1)
DEPRECATED RDW RBC AUTO: 37.6 FL (ref 37–54)
EGFRCR SERPLBLD CKD-EPI 2021: 80.4 ML/MIN/1.73
ERYTHROCYTE [DISTWIDTH] IN BLOOD BY AUTOMATED COUNT: 11.9 % (ref 12.3–15.4)
GLOBULIN UR ELPH-MCNC: 3.5 GM/DL
GLUCOSE SERPL-MCNC: 92 MG/DL (ref 65–99)
HCT VFR BLD AUTO: 42.5 % (ref 34–46.6)
HCV AB SER DONR QL: NORMAL
HDLC SERPL-MCNC: 35 MG/DL (ref 40–60)
HGB BLD-MCNC: 14.6 G/DL (ref 12–15.9)
LDLC SERPL CALC-MCNC: 102 MG/DL (ref 0–100)
LDLC/HDLC SERPL: 2.84 {RATIO}
MCH RBC QN AUTO: 29.8 PG (ref 26.6–33)
MCHC RBC AUTO-ENTMCNC: 34.4 G/DL (ref 31.5–35.7)
MCV RBC AUTO: 86.7 FL (ref 79–97)
PLATELET # BLD AUTO: 270 10*3/MM3 (ref 140–450)
PMV BLD AUTO: 11.6 FL (ref 6–12)
POTASSIUM SERPL-SCNC: 4.9 MMOL/L (ref 3.5–5.2)
PROT SERPL-MCNC: 7.8 G/DL (ref 6–8.5)
RBC # BLD AUTO: 4.9 10*6/MM3 (ref 3.77–5.28)
SODIUM SERPL-SCNC: 141 MMOL/L (ref 136–145)
TRIGL SERPL-MCNC: 128 MG/DL (ref 0–150)
TSH SERPL DL<=0.05 MIU/L-ACNC: 2.68 UIU/ML (ref 0.27–4.2)
VLDLC SERPL-MCNC: 23 MG/DL (ref 5–40)
WBC NRBC COR # BLD: 6.83 10*3/MM3 (ref 3.4–10.8)

## 2023-05-11 PROCEDURE — 80061 LIPID PANEL: CPT | Performed by: NURSE PRACTITIONER

## 2023-05-11 PROCEDURE — 84443 ASSAY THYROID STIM HORMONE: CPT | Performed by: NURSE PRACTITIONER

## 2023-05-11 PROCEDURE — 85027 COMPLETE CBC AUTOMATED: CPT | Performed by: NURSE PRACTITIONER

## 2023-05-11 PROCEDURE — 86803 HEPATITIS C AB TEST: CPT | Performed by: NURSE PRACTITIONER

## 2023-05-11 PROCEDURE — 80053 COMPREHEN METABOLIC PANEL: CPT | Performed by: NURSE PRACTITIONER

## 2023-05-11 RX ORDER — NITROFURANTOIN 25; 75 MG/1; MG/1
CAPSULE ORAL
COMMUNITY
Start: 2023-05-10

## 2023-08-21 ENCOUNTER — HOSPITAL ENCOUNTER (OUTPATIENT)
Dept: MAMMOGRAPHY | Facility: HOSPITAL | Age: 53
Discharge: HOME | End: 2023-08-21
Admitting: NURSE PRACTITIONER
Payer: MEDICARE

## 2023-08-21 DIAGNOSIS — Z12.31 ENCOUNTER FOR SCREENING MAMMOGRAM FOR MALIGNANT NEOPLASM OF BREAST: ICD-10-CM

## 2023-08-21 PROCEDURE — 77063 BREAST TOMOSYNTHESIS BI: CPT

## 2023-08-21 PROCEDURE — 77067 SCR MAMMO BI INCL CAD: CPT

## 2023-08-24 ENCOUNTER — OFFICE VISIT (OUTPATIENT)
Dept: FAMILY MEDICINE CLINIC | Facility: CLINIC | Age: 53
End: 2023-08-24
Payer: MEDICARE

## 2023-08-24 VITALS
TEMPERATURE: 97 F | SYSTOLIC BLOOD PRESSURE: 125 MMHG | WEIGHT: 232.4 LBS | HEART RATE: 66 BPM | BODY MASS INDEX: 39.89 KG/M2 | DIASTOLIC BLOOD PRESSURE: 63 MMHG | OXYGEN SATURATION: 96 %

## 2023-08-24 DIAGNOSIS — E78.5 HYPERLIPIDEMIA, UNSPECIFIED HYPERLIPIDEMIA TYPE: Primary | ICD-10-CM

## 2023-08-24 DIAGNOSIS — M54.32 SCIATICA OF LEFT SIDE: ICD-10-CM

## 2023-08-24 PROCEDURE — 99213 OFFICE O/P EST LOW 20 MIN: CPT | Performed by: NURSE PRACTITIONER

## 2023-08-24 PROCEDURE — 1160F RVW MEDS BY RX/DR IN RCRD: CPT | Performed by: NURSE PRACTITIONER

## 2023-08-24 PROCEDURE — 1159F MED LIST DOCD IN RCRD: CPT | Performed by: NURSE PRACTITIONER

## 2023-08-24 NOTE — PATIENT INSTRUCTIONS
"High Cholesterol    High cholesterol is a condition in which the blood has high levels of a white, waxy substance similar to fat (cholesterol). The liver makes all the cholesterol that the body needs. The human body needs small amounts of cholesterol to help build cells. A person gets extra or excess cholesterol from the food that he or she eats.  The blood carries cholesterol from the liver to the rest of the body. If you have high cholesterol, deposits (plaques) may build up on the walls of your arteries. Arteries are the blood vessels that carry blood away from your heart. These plaques make the arteries narrow and stiff.  Cholesterol plaques increase your risk for heart attack and stroke. Work with your health care provider to keep your cholesterol levels in a healthy range.  What increases the risk?  The following factors may make you more likely to develop this condition:  Eating foods that are high in animal fat (saturated fat) or cholesterol.  Being overweight.  Not getting enough exercise.  A family history of high cholesterol (familial hypercholesterolemia).  Use of tobacco products.  Having diabetes.  What are the signs or symptoms?  In most cases, high cholesterol does not usually cause any symptoms.  In severe cases, very high cholesterol levels can cause:  Fatty bumps under the skin (xanthomas).  A white or gray ring around the black center (pupil) of the eye.  How is this diagnosed?  This condition may be diagnosed based on the results of a blood test.  If you are older than 20 years of age, your health care provider may check your cholesterol levels every 4-6 years.  You may be checked more often if you have high cholesterol or other risk factors for heart disease.  The blood test for cholesterol measures:  \"Bad\" cholesterol, or LDL cholesterol. This is the main type of cholesterol that causes heart disease. The desired level is less than 100 mg/dL (2.59 mmol/L).  \"Good\" cholesterol, or HDL " cholesterol. HDL helps protect against heart disease by cleaning the arteries and carrying the LDL to the liver for processing. The desired level for HDL is 60 mg/dL (1.55 mmol/L) or higher.  Triglycerides. These are fats that your body can store or burn for energy. The desired level is less than 150 mg/dL (1.69 mmol/L).  Total cholesterol. This measures the total amount of cholesterol in your blood and includes LDL, HDL, and triglycerides. The desired level is less than 200 mg/dL (5.17 mmol/L).  How is this treated?  Treatment for high cholesterol starts with lifestyle changes, such as diet and exercise.  Diet changes. You may be asked to eat foods that have more fiber and less saturated fats or added sugar.  Lifestyle changes. These may include regular exercise, maintaining a healthy weight, and quitting use of tobacco products.  Medicines. These are given when diet and lifestyle changes have not worked. You may be prescribed a statin medicine to help lower your cholesterol levels.  Follow these instructions at home:  Eating and drinking    Eat a healthy, balanced diet. This diet includes:  Daily servings of a variety of fresh, frozen, or canned fruits and vegetables.  Daily servings of whole grain foods that are rich in fiber.  Foods that are low in saturated fats and trans fats. These include poultry and fish without skin, lean cuts of meat, and low-fat dairy products.  A variety of fish, especially oily fish that contain omega-3 fatty acids. Aim to eat fish at least 2 times a week.  Avoid foods and drinks that have added sugar.  Use healthy cooking methods, such as roasting, grilling, broiling, baking, poaching, steaming, and stir-frying. Do not blackman your food except for stir-frying.  If you drink alcohol:  Limit how much you have to:  0-1 drink a day for women who are not pregnant.  0-2 drinks a day for men.  Know how much alcohol is in a drink. In the [place] one drink equals one 12 oz bottle of beer (355 mL),  "one 5 oz glass of wine (148 mL), or one 1« oz glass of hard liquor (44 mL).  Lifestyle    Get regular exercise. Aim to exercise for a total of 150 minutes a week. Increase your activity level by doing activities such as gardening, walking, and taking the stairs.  Do not use any products that contain nicotine or tobacco. These products include cigarettes, chewing tobacco, and vaping devices, such as e-cigarettes. If you need help quitting, ask your health care provider.  General instructions  Take over-the-counter and prescription medicines only as told by your health care provider.  Keep all follow-up visits. This is important.  Where to find more information  American Heart Association: www.heart.org  National Heart, Lung, and Blood [place]: www.nhlbi.nih.gov  Contact a health care provider if:  You have trouble achieving or maintaining a healthy diet or weight.  You are starting an exercise program.  You are unable to stop smoking.  Get help right away if:  You have chest pain.  You have trouble breathing.  You have discomfort or pain in your jaw, neck, back, shoulder, or arm.  You have any symptoms of a stroke. \"BE FAST\" is an easy way to remember the main warning signs of a stroke:  B - Balance. Signs are dizziness, sudden trouble walking, or loss of balance.  E - Eyes. Signs are trouble seeing or a sudden change in vision.  F - Face. Signs are sudden weakness or numbness of the face, or the face or eyelid drooping on one side.  A - Arms. Signs are weakness or numbness in an arm. This happens suddenly and usually on one side of the body.  S - Speech. Signs are sudden trouble speaking, slurred speech, or trouble understanding what people say.  T - Time. Time to call emergency services. Write down what time symptoms started.  You have other signs of a stroke, such as:  A sudden, severe headache with no known cause.  Nausea or vomiting.  Seizure.  These symptoms may represent a serious problem that is an " emergency. Do not wait to see if the symptoms will go away. Get medical help right away. Call your local emergency services (911 in the [place] Do not drive yourself to the hospital.  Summary  Cholesterol plaques increase your risk for heart attack and stroke. Work with your health care provider to keep your cholesterol levels in a healthy range.  Eat a healthy, balanced diet, get regular exercise, and maintain a healthy weight.  Do not use any products that contain nicotine or tobacco. These products include cigarettes, chewing tobacco, and vaping devices, such as e-cigarettes.  Get help right away if you have any symptoms of a stroke.  This information is not intended to replace advice given to you by your health care provider. Make sure you discuss any questions you have with your health care provider.  Document Revised: 03/03/2022 Document Reviewed: 02/21/2022  [employer] Patient Education c 2023 [employer]

## 2023-09-01 ENCOUNTER — OFFICE VISIT (OUTPATIENT)
Dept: ORTHOPEDIC SURGERY | Facility: CLINIC | Age: 53
End: 2023-09-01
Payer: MEDICARE

## 2023-09-01 VITALS
WEIGHT: 236.4 LBS | OXYGEN SATURATION: 95 % | HEART RATE: 64 BPM | DIASTOLIC BLOOD PRESSURE: 62 MMHG | SYSTOLIC BLOOD PRESSURE: 121 MMHG | HEIGHT: 64 IN | BODY MASS INDEX: 40.36 KG/M2

## 2023-09-01 DIAGNOSIS — M25.561 RIGHT KNEE PAIN, UNSPECIFIED CHRONICITY: Primary | ICD-10-CM

## 2023-09-01 DIAGNOSIS — M17.11 PRIMARY OSTEOARTHRITIS OF RIGHT KNEE: ICD-10-CM

## 2023-09-01 NOTE — PROGRESS NOTES
"Chief Complaint  Initial Evaluation of the Right Knee     Subjective      Slime Mcelroy presents to [facility] for initial evaluation of the right knee. She has tingling all around the knee.  She has had that for a couple of weeks.  She had no injury or fall.  She has good movement.  No tingling down the leg.  When she is sitting or lying she has no problems.      Allergies   Allergen Reactions    Penicillins Hives    Amoxicillin Hives    Cephalosporins Hives    Codeine Hives    Diphenhydramine Hives    Hydrocodone-Acetaminophen Hives        Social History     Socioeconomic History    Marital status:    Tobacco Use    Smoking status: Every Day     Packs/day: 1.00     Types: Cigarettes    Smokeless tobacco: Current   Vaping Use    Vaping Use: Never used   Substance and Sexual Activity    Alcohol use: Never    Drug use: Never    Sexual activity: Defer        I reviewed the patient's chief complaint, history of present illness, review of systems, past medical history, surgical history, family history, social history, medications, and allergy list.     Review of Systems     Constitutional: Denies fevers, chills, weight loss  Cardiovascular: Denies chest pain, shortness of breath  Skin: Denies rashes, acute skin changes  Neurologic: Denies headache, loss of consciousness        Vital Signs:   /62 (BP Location: Right arm, Patient Position: Sitting, Cuff Size: Large Adult)   Pulse 64   Ht 162.6 cm (64\")   Wt 107 kg (236 lb 6.4 oz)   SpO2 95%   BMI 40.58 kg/mý          Physical Exam  General: Alert. No acute distress    Ortho Exam        RIGHT KNEE Flexion 120. Extension 0. Stable to varus/valgus stress. Stable to anterior/posterior drawer. Neurovascularly intact. Negative Noah. Positive Lachman. Positive EHL, FHL, HS and TA. Sensation intact to light touch all 5 nerves of the foot. Ambulates with Antalgic gait. Patella is well tracking. Calf supple, non-tender. " Negative tenderness to the medial joint line. Negative tenderness to the lateral joint line. Positive Crepitus. Good strength to hamstrings, quadriceps, dorsiflexors, and plantar flexors.  Knee Extensor Mechanism intact. Tingling around the knee.         Procedures      Imaging Results (Most Recent)       Procedure Component Value Units Date/Time    XR Knee 3 View Right [[id number]] Resulted: 09/01/23 0837     Updated: 09/01/23 0839             Result Review :     X-Ray Report:  Right knee X-Ray  Indication: Evaluation of the right knee  AP/Lateral and Foster view(s)  Findings: Moderate arthritis with joint space narrowing.   Prior studies available for comparison: no                  Assessment and Plan     Diagnoses and all orders for this visit:    1. Right knee pain, unspecified chronicity (Primary)  -     XR Knee 3 View Right    2. Primary osteoarthritis of right knee        Discussed the treatment plan with the patient. I reviewed the X-rays that were obtained today with the patient.     Prescribed topical compound cream. HEP exercises.       Call or return if worsening symptoms.    Follow Up     PRN      Patient was given instructions and counseling regarding her condition or for health maintenance advice. Please see specific information pulled into the AVS if appropriate.     Scribed for Hannah Cerda MD by Kenisha Pedroza MA.  09/01/23   08:39 EDT    I have personally performed the services described in this document as scribed by the above individual and it is both accurate and complete. Hannah Cerda MD 09/01/23

## 2023-09-27 ENCOUNTER — TELEPHONE (OUTPATIENT)
Dept: FAMILY MEDICINE CLINIC | Facility: CLINIC | Age: 53
End: 2023-09-27
Payer: MEDICARE

## 2023-09-27 NOTE — TELEPHONE ENCOUNTER
Called patient and advised her to call back and ask to speak with someone else- we have no way of knowing what her insurance covers.

## 2023-09-27 NOTE — TELEPHONE ENCOUNTER
Caller: Slime Mcelroy    Relationship: Self    Best call back number: [phone]     What is the best time to reach you: ANY    Who are you requesting to speak with (clinical staff, provider,  specific staff member): CLINICAL    What was the call regarding: PATIENT STATES THAT SHE CALLED HER INSURANCE COMPANY TO SEE IF THEY WOULD COVER HER ORTHOTICS AND WAS TOLD TO CALL HER PRIMARY CARE PROVIDER.  PATIENT STATES THAT THE INSURANCE TOLD HER THAT THEY COULD NOT TELL HER WHAT THEY WOULD COVER AND THAT ONLY HER PRIMARY CARE PROVIDER WOULD KNOW.     Is it okay if the provider responds through MyChart: NO

## 2023-11-02 ENCOUNTER — OFFICE VISIT (OUTPATIENT)
Dept: FAMILY MEDICINE CLINIC | Facility: CLINIC | Age: 53
End: 2023-11-02
Payer: MEDICARE

## 2023-11-02 VITALS
DIASTOLIC BLOOD PRESSURE: 78 MMHG | HEIGHT: 64 IN | BODY MASS INDEX: 40.74 KG/M2 | OXYGEN SATURATION: 97 % | WEIGHT: 238.6 LBS | TEMPERATURE: 98 F | SYSTOLIC BLOOD PRESSURE: 123 MMHG | HEART RATE: 69 BPM

## 2023-11-02 DIAGNOSIS — J02.9 SORE THROAT: ICD-10-CM

## 2023-11-02 DIAGNOSIS — J01.20 ACUTE NON-RECURRENT ETHMOIDAL SINUSITIS: Primary | ICD-10-CM

## 2023-11-02 LAB
EXPIRATION DATE: NORMAL
INTERNAL CONTROL: NORMAL
Lab: NORMAL
S PYO AG THROAT QL: NEGATIVE

## 2023-11-02 PROCEDURE — 1160F RVW MEDS BY RX/DR IN RCRD: CPT | Performed by: NURSE PRACTITIONER

## 2023-11-02 PROCEDURE — 87880 STREP A ASSAY W/OPTIC: CPT | Performed by: NURSE PRACTITIONER

## 2023-11-02 PROCEDURE — 99213 OFFICE O/P EST LOW 20 MIN: CPT | Performed by: NURSE PRACTITIONER

## 2023-11-02 PROCEDURE — 1159F MED LIST DOCD IN RCRD: CPT | Performed by: NURSE PRACTITIONER

## 2023-11-02 RX ORDER — AZITHROMYCIN 250 MG/1
TABLET, FILM COATED ORAL
Qty: 6 TABLET | Refills: 0 | Status: SHIPPED | OUTPATIENT
Start: 2023-11-02

## 2023-11-02 NOTE — PROGRESS NOTES
Chief Complaint  Earache (Right; since last night) and Sore Throat (Since last night)    Subjective          Slime Mcelroy is a 52 y.o. female who presents to [facility]    History of Present Illness    Patient states she is tired and hasn't been feeling well, she has been taking care of three sick grandkids.     Patient states that she has had an earache and sore throat since last night. Does have known sick contacts.     Review of Systems   Constitutional:  Negative for chills, fatigue and fever.   HENT:  Positive for congestion, ear pain, postnasal drip and sore throat. Negative for sinus pressure and sinus pain.    Respiratory:  Negative for cough and shortness of breath.    Cardiovascular:  Negative for chest pain and palpitations.   Gastrointestinal:  Negative for constipation, diarrhea, nausea and vomiting.   Musculoskeletal:  Negative for back pain and neck pain.   Skin:  Negative for rash.   Neurological:  Negative for dizziness and headaches.          Medical History: has a past medical history of Allergic rhinitis (05/14/2015), Ankle pain, left, Arthralgia, Arthritis, Arthritis, rheumatoid, Arthropathy, Athlete's foot, Broken bones, Corns and callus, Fatigue, Heel pain (2000), Hemorrhoids, Hyperlipidemia, Ingrowing toenail (2014), Kidney calculus, Limb swelling, Plantar wart (1984), Primary osteoarthritis of left knee (05/01/2018), Renal calculus, and Seasonal allergies.     Surgical History: has a past surgical history that includes Colonoscopy (08/07/2019); Dilation and curettage of uterus; Dilation and curettage of uterus; Dilation and curettage of uterus; Elbow surgery; Hand surgery (Left); Other surgical history; Other surgical history; Tendon repair; Finger surgery; and Toe Nail Amputation (2014).     Family History: family history includes Arthritis in her brother, father, mother, sister, and son; Diabetes in her brother and sister; Diabetes type II in her  "brother and sister; Heart disease in her maternal grandfather and maternal grandmother; Osteoporosis in her mother.     Social History: reports that she has been smoking cigarettes. She has been smoking an average of 1 pack per day. She uses smokeless tobacco. She reports that she does not drink alcohol and does not use drugs.    Allergies: Penicillins, Amoxicillin, Cephalosporins, Codeine, Diphenhydramine, and Hydrocodone-acetaminophen      There are no preventive care reminders to display for this patient.           Current Outpatient Medications:     fexofenadine (ALLEGRA) 180 MG tablet, Take 1 tablet by mouth Daily., Disp: , Rfl:     fluticasone (FLONASE) 50 MCG/ACT nasal spray, 2 sprays by Each Nare route Daily., Disp: 16 mL, Rfl: 5    gabapentin (NEURONTIN) 300 MG capsule, Take 1 capsule by mouth 3 (Three) Times a Day., Disp: 180 capsule, Rfl: 1    montelukast (SINGULAIR) 10 MG tablet, TAKE ONE TABLET BY MOUTH ONCE NIGHTLY, Disp: 90 tablet, Rfl: 1    traMADol (ULTRAM) 50 MG tablet, Take 1 tablet by mouth Every 6 (Six) Hours As Needed for Moderate Pain., Disp: 30 tablet, Rfl: 0    azithromycin (Zithromax) 250 MG tablet, 2 tabs on day one and 1 tab day 2-5., Disp: 6 tablet, Rfl: 0      Immunization History   Administered Date(s) Administered    Shingrix 11/17/2021, 11/24/2021, 04/22/2022    Tdap 03/31/2016    Tetanus Toxoid, Not Adsorbed 06/21/2009         Objective       Vitals:    11/02/23 0908   BP: 123/78   Pulse: 69   Temp: 98 °F (36.7 °C)   TempSrc: Temporal   SpO2: 97%   Weight: 108 kg (238 lb 9.6 oz)   Height: 162.6 cm (64.02\")      Body mass index is 40.94 kg/m².   Wt Readings from Last 3 Encounters:   11/02/23 108 kg (238 lb 9.6 oz)   09/01/23 107 kg (236 lb 6.4 oz)   08/24/23 105 kg (232 lb 6.4 oz)      BP Readings from Last 3 Encounters:   11/02/23 123/78   09/01/23 121/62   08/24/23 125/63                Physical Exam  Vitals reviewed.   Constitutional:       Appearance: Normal appearance. She is " well-developed.   HENT:      Head: Normocephalic and atraumatic.      Comments: Ethmoid sinus pressure      Right Ear: No middle ear effusion.      Left Ear:  No middle ear effusion.      Mouth/Throat:      Pharynx: Oropharyngeal exudate and posterior oropharyngeal erythema present.   Eyes:      Conjunctiva/sclera: Conjunctivae normal.      Pupils: Pupils are equal, round, and reactive to light.   Cardiovascular:      Rate and Rhythm: Normal rate and regular rhythm.      Heart sounds: Normal heart sounds. No murmur heard.  Pulmonary:      Effort: Pulmonary effort is normal.      Breath sounds: Normal breath sounds. No wheezing or rhonchi.   Abdominal:      General: Bowel sounds are normal. There is no distension.      Palpations: Abdomen is soft.      Tenderness: There is no abdominal tenderness.   Skin:     General: Skin is warm and dry.   Neurological:      Mental Status: She is alert and oriented to person, place, and time.   Psychiatric:         Mood and Affect: Mood and affect normal.         Behavior: Behavior normal.         Thought Content: Thought content normal.         Judgment: Judgment normal.             Result Review :       Common labs          5/11/2023    09:27   Common Labs   Glucose 92    BUN 12    Creatinine 0.86    Sodium 141    Potassium 4.9    Chloride 105    Calcium 9.4    Albumin 4.3    Total Bilirubin 0.3    Alkaline Phosphatase 106    AST (SGOT) 19    ALT (SGPT) 12    WBC 6.83    Hemoglobin 14.6    Hematocrit 42.5    Platelets 270    Total Cholesterol 160    Triglycerides 128    HDL Cholesterol 35    LDL Cholesterol  102        Lab Results   Component Value Date    COVID19 NOT DETECTED 01/06/2021    RAPSCRN Negative 11/02/2023    INR 0.91 (L) 12/28/2020                  Assessment and Plan        Diagnoses and all orders for this visit:    1. Acute non-recurrent ethmoidal sinusitis (Primary)  -     azithromycin (Zithromax) 250 MG tablet; 2 tabs on day one and 1 tab day 2-5.  Dispense: 6  tablet; Refill: 0    2. Sore throat  -     POCT rapid strep A          Follow Up     Return if symptoms worsen or fail to improve.    Patient was given instructions and counseling regarding her condition or for health maintenance advice. Please see specific information pulled into the AVS if appropriate.     ALYSSA Lowe

## 2024-02-03 ENCOUNTER — TELEMEDICINE (OUTPATIENT)
Dept: FAMILY MEDICINE CLINIC | Facility: TELEHEALTH | Age: 54
End: 2024-02-03
Payer: MEDICARE

## 2024-02-03 VITALS — TEMPERATURE: 96.7 F | HEART RATE: 84 BPM

## 2024-02-03 DIAGNOSIS — J10.1 INFLUENZA B: ICD-10-CM

## 2024-02-03 DIAGNOSIS — H61.22 IMPACTED CERUMEN OF LEFT EAR: ICD-10-CM

## 2024-02-03 DIAGNOSIS — H66.001 ACUTE SUPPURATIVE OTITIS MEDIA OF RIGHT EAR WITHOUT SPONTANEOUS RUPTURE OF TYMPANIC MEMBRANE, RECURRENCE NOT SPECIFIED: Primary | ICD-10-CM

## 2024-02-03 PROCEDURE — 1159F MED LIST DOCD IN RCRD: CPT | Performed by: NURSE PRACTITIONER

## 2024-02-03 PROCEDURE — 1160F RVW MEDS BY RX/DR IN RCRD: CPT | Performed by: NURSE PRACTITIONER

## 2024-02-03 PROCEDURE — 99213 OFFICE O/P EST LOW 20 MIN: CPT | Performed by: NURSE PRACTITIONER

## 2024-02-03 RX ORDER — AZITHROMYCIN 500 MG/1
500 TABLET, FILM COATED ORAL DAILY
Qty: 5 TABLET | Refills: 0 | Status: SHIPPED | OUTPATIENT
Start: 2024-02-03 | End: 2024-02-08

## 2024-02-03 NOTE — PROGRESS NOTES
CHIEF COMPLAINT  Chief Complaint   Patient presents with    Sore Throat    Earache         HPI  Slime Mcelroy is a 53 y.o. female  presents with complaint of bilateral ear pain with right worse than left.   She has the flu and is taking Tamiflu. Her ear started hurting yesterday and she is not able to get relief. She also has a sore throat.     Review of Systems   Constitutional:  Positive for chills and fatigue.   HENT:  Positive for congestion, ear pain, hearing loss, postnasal drip, rhinorrhea, sinus pressure and sore throat. Negative for ear discharge.    Respiratory:  Positive for cough.    Musculoskeletal:  Positive for myalgias.   Neurological:  Positive for headaches.       Past Medical History:   Diagnosis Date    Allergic rhinitis 05/14/2015    Ankle pain, left     Arthralgia     Arthritis     Arthritis, rheumatoid     Arthropathy     Unspecified    Athlete's foot     Broken bones     Corns and callus     Fatigue     Unspecified type    Heel pain 2000    Hemorrhoids     Unspecified without complications    Hyperlipidemia     Ingrowing toenail 2014    Kidney calculus     Limb swelling     Plantar wart 1984    Primary osteoarthritis of left knee 05/01/2018    Renal calculus     Seasonal allergies        Family History   Problem Relation Age of Onset    Arthritis Mother     Osteoporosis Mother     Arthritis Father     Diabetes Sister     Diabetes type II Sister     Arthritis Sister     Diabetes Brother     Diabetes type II Brother     Arthritis Brother     Heart disease Maternal Grandmother     Heart disease Maternal Grandfather     Arthritis Son     Cancer Neg Hx     Stroke Neg Hx        Social History     Socioeconomic History    Marital status:    Tobacco Use    Smoking status: Every Day     Packs/day: 1     Types: Cigarettes    Smokeless tobacco: Current    Tobacco comments:     She is considering quitting. She does understand the health risk of smoking.    Vaping Use    Vaping Use: Never used    Substance and Sexual Activity    Alcohol use: Never    Drug use: Never    Sexual activity: Defer       Slime Mcelroy  reports that she has been smoking cigarettes. She has been smoking an average of 1 pack per day. She uses smokeless tobacco. Considering quitting. She does understand the health risk of smoking.     Pulse 84   Temp 96.7 °F (35.9 °C)     PHYSICAL EXAM  Physical Exam   Constitutional: She is oriented to person, place, and time. She appears well-developed and well-nourished. She does not have a sickly appearance. She does not appear ill. No distress.   HENT:   Head: Normocephalic and atraumatic.   Right Ear: Hearing and external ear normal.   Left Ear: External ear normal. Decreased hearing is noted.   Mouth/Throat: Mouth/Lips are normal.Uvula is midline and oropharynx is clear and moist. Mucous membranes are not pale, not dry, not cyanotic and erythematous. No tonsillar exudate. no white patchesblistered (left tonsil 2+).  Right ear with erythema and bulging. Left ear cerumen impaction.    Eyes: EOM are normal.   Neck: Neck normal appearance.  Pulmonary/Chest: Effort normal.  No respiratory distress.  Neurological: She is alert and oriented to person, place, and time.   Skin: Skin is dry.   Psychiatric: She has a normal mood and affect.       Results for orders placed or performed during the hospital encounter of 02/01/24   Covid-19 + Flu A&B AG, Veritor ([id number])    Specimen: Swab   Result Value Ref Range    SARS Antigen Not Detected Not Detected, Presumptive Negative    Influenza A Antigen OLIVERIO Not Detected Not Detected    Influenza B Antigen OLIVERIO Detected (A) Not Detected    Internal Control Passed Passed    Lot Number [id number]     Expiration Date 01/16/2025        Diagnoses and all orders for this visit:    1. Acute suppurative otitis media of right ear without spontaneous rupture of tympanic membrane, recurrence not specified (Primary)    2. Impacted cerumen of left ear    3. Influenza  B    Other orders  -     azithromycin (Zithromax) 500 MG tablet; Take 1 tablet by mouth Daily for 5 days.  Dispense: 5 tablet; Refill: 0    Continue Tamiflu as directed  Declines ear wax removal. She has a kit at home.   She does not use cotton swabs    The use of a video visit has been reviewed with the patient and verbal informed consent has been obtained. Myself and Slime Mcelroy participated in this visit. The patient is located in [place], [place]. I am located in [place], [place]. Mychart and Tyto were utilized.       Note Disclaimer: At [facility], we believe that sharing information builds trust and better   relationships. You are receiving this note because you recently visited [facility]. It is possible you   will see health information before a provider has talked with you about it. This kind of information can   be easy to misunderstand. To help you fully understand what it means for your health, we urge you to   discuss this note with your provider.    ALYSSA Melendrez  02/03/2024  10:08 EST

## 2024-02-03 NOTE — PATIENT INSTRUCTIONS
Left ear wax impaction     Drink plenty of water  Over the counter pain relievers okay   If symptoms do not improve in 3-5 days follow up with your primary care provider or urgent care  If symptoms worsen follow up with urgent care or the emergency room         Clinical References    Otitis Media, Adult  Otitis media occurs when there is inflammation and fluid in the middle ear with signs and symptoms of an acute infection. The middle ear is a part of the ear that contains bones for hearing as well as air that helps send sounds to the brain. When infected fluid builds up in this space, it causes pressure and can lead to an ear infection. The eustachian tube connects the middle ear to the back of the nose (nasopharynx) and normally allows air into the middle ear. If the eustachian tube becomes blocked, fluid can build up and become infected.  What are the causes?  This condition is caused by a blockage in the eustachian tube. This can be caused by mucus or by swelling of the tube. Problems that can cause a blockage include:  A cold or other upper respiratory infection.  Allergies.  An irritant, such as tobacco smoke.  Enlarged adenoids. The adenoids are areas of soft tissue located high in the back of the throat, behind the nose and the roof of the mouth. They are part of the body's defense system (immune system).  A mass in the nasopharynx.  Damage to the ear caused by pressure changes (barotrauma).  What increases the risk?  You are more likely to develop this condition if you:  Smoke or are exposed to tobacco smoke.  Have an opening in the roof of your mouth (cleft palate).  Have gastroesophageal reflux.  Have an immune system disorder.  What are the signs or symptoms?  Symptoms of this condition include:  Ear pain.  Fever.  Decreased hearing.  Tiredness (lethargy).  Fluid leaking from the ear, if the eardrum is ruptured or has burst.  Ringing in the ear.  How is this diagnosed?  This condition is diagnosed with a  physical exam. During the exam, your health care provider will use an instrument called an otoscope to look in your ear and check for redness, swelling, and fluid. He or she will also ask about your symptoms.  Your health care provider may also order tests, such as:  A pneumatic otoscopy. This is a test to check the movement of the eardrum. It is done by squeezing a small amount of air into the ear.  A tympanogram. This is a test that shows how well the eardrum moves in response to air pressure in the ear canal. It provides a graph for your health care provider to review.  How is this treated?  This condition can go away on its own within 3-5 days. But if the condition is caused by a bacterial infection and does not go away on its own, or if it keeps coming back, your health care provider may:  Prescribe antibiotic medicine to treat the infection.  Prescribe or recommend medicines to control pain.  Follow these instructions at home:  Take over-the-counter and prescription medicines only as told by your health care provider.  If you were prescribed an antibiotic medicine, take it as told by your health care provider. Do not stop taking the antibiotic even if you start to feel better.  Keep all follow-up visits. This is important.  Contact a health care provider if:  You have bleeding from your nose.  There is a lump on your neck.  You are not feeling better in 5 days.  You feel worse instead of better.  Get help right away if:  You have severe pain that is not controlled with medicine.  You have swelling, redness, or pain around your ear.  You have stiffness in your neck.  A part of your face is not moving (paralyzed).  The bone behind your ear (mastoid bone) is tender when you touch it.  You develop a severe headache.  Summary  Otitis media is redness, soreness, and swelling of the middle ear, usually resulting in pain and decreased hearing.  This condition can go away on its own within 3-5 days.  If the problem  does not go away in 3-5 days, your health care provider may give you medicines to treat the infection.  If you were prescribed an antibiotic medicine, take it as told by your health care provider.  Follow all instructions that were given to you by your health care provider.  This information is not intended to replace advice given to you by your health care provider. Make sure you discuss any questions you have with your health care provider.  Document Revised: 03/28/2022 Document Reviewed: 03/28/2022  [employer] Patient Education © 2023 [employer]     Earwax Buildup, Adult  The ears produce a substance called earwax that helps keep bacteria out of the ear and protects the skin in the ear canal. Occasionally, earwax can build up in the ear and cause discomfort or hearing loss.  What are the causes?  This condition is caused by a buildup of earwax. Ear canals are self-cleaning. Ear wax is made in the outer part of the ear canal and generally falls out in small amounts over time.  When the self-cleaning mechanism is not working, earwax builds up and can cause decreased hearing and discomfort. Attempting to clean ears with cotton swabs can push the earwax deep into the ear canal and cause decreased hearing and pain.  What increases the risk?  This condition is more likely to develop in people who:  Clean their ears often with cotton swabs.  Pick at their ears.  Use earplugs or in-ear headphones often, or wear hearing aids.  The following factors may also make you more likely to develop this condition:  Being male.  Being of older age.  Naturally producing more earwax.  Having narrow ear canals.  Having earwax that is overly thick or sticky.  Having excess hair in the ear canal.  Having eczema.  Being dehydrated.  What are the signs or symptoms?  Symptoms of this condition include:  Reduced or muffled hearing.  A feeling of fullness in the ear or feeling that the ear is plugged.  Fluid coming from the ear.  Ear pain or  an itchy ear.  Ringing in the ear.  Coughing.  Balance problems.  An obvious piece of earwax that can be seen inside the ear canal.  How is this diagnosed?  This condition may be diagnosed based on:  Your symptoms.  Your medical history.  An ear exam. During the exam, your health care provider will look into your ear with an instrument called an otoscope.  You may have tests, including a hearing test.  How is this treated?  This condition may be treated by:  Using ear drops to soften the earwax.  Having the earwax removed by a health care provider. The health care provider may:  Flush the ear with water.  Use an instrument that has a loop on the end (curette).  Use a suction device.  Having surgery to remove the wax buildup. This may be done in severe cases.  Follow these instructions at home:  Take over-the-counter and prescription medicines only as told by your health care provider.  Do not put any objects, including cotton swabs, into your ear. You can clean the opening of your ear canal with a washcloth or facial tissue.  Follow instructions from your health care provider about cleaning your ears. Do not overclean your ears.  Drink enough fluid to keep your urine pale yellow. This will help to thin the earwax.  Keep all follow-up visits as told. If earwax builds up in your ears often or if you use hearing aids, consider seeing your health care provider for routine, preventive ear cleanings. Ask your health care provider how often you should schedule your cleanings.  If you have hearing aids, clean them according to instructions from the  and your health care provider.  Contact a health care provider if:  You have ear pain.  You develop a fever.  You have pus or other fluid coming from your ear.  You have hearing loss.  You have ringing in your ears that does not go away.  You feel like the room is spinning (vertigo).  Your symptoms do not improve with treatment.  Get help right away if:  You have  "bleeding from the affected ear.  You have severe ear pain.  Summary  Earwax can build up in the ear and cause discomfort or hearing loss.  The most common symptoms of this condition include reduced or muffled hearing, a feeling of fullness in the ear, or feeling that the ear is plugged.  This condition may be diagnosed based on your symptoms, your medical history, and an ear exam.  This condition may be treated by using ear drops to soften the earwax or by having the earwax removed by a health care provider.  Do not put any objects, including cotton swabs, into your ear. You can clean the opening of your ear canal with a washcloth or facial tissue.  This information is not intended to replace advice given to you by your health care provider. Make sure you discuss any questions you have with your health care provider.  Document Revised: 04/06/2021 Document Reviewed: 04/06/2021  [employer] Patient Education © 2023 [employer]     Influenza, Adult  Influenza, also called \"the flu,\" is a viral infection that mainly affects the respiratory tract. This includes the lungs, nose, and throat. The flu spreads easily from person to person (is contagious). It causes common cold symptoms, along with high fever and body aches.  What are the causes?  This condition is caused by the influenza virus. You can get the virus by:  Breathing in droplets that are in the air from an infected person's cough or sneeze.  Touching something that has the virus on it (has been contaminated) and then touching your mouth, nose, or eyes.  What increases the risk?  The following factors may make you more likely to get the flu:  Not washing or sanitizing your hands often.  Having close contact with many people during cold and flu season.  Touching your mouth, eyes, or nose without first washing or sanitizing your hands.  Not getting an annual flu shot.  You may have a higher risk for the flu, including serious problems, such as a lung infection " (pneumonia), if you:  Are older than 65.  Are pregnant.  Have a weakened disease-fighting system (immune system). This includes people who have HIV or AIDS, are on chemotherapy, or are taking medicines that reduce (suppress) the immune system.  Have a long-term (chronic) illness, such as heart disease, kidney disease, diabetes, or lung disease.  Have a liver disorder.  Are severely overweight (morbidly obese).  Have anemia.  Have asthma.  What are the signs or symptoms?  Symptoms of this condition usually begin suddenly and last 4-14 days. These may include:  Fever and chills.  Headaches, body aches, or muscle aches.  Sore throat.  Cough.  Runny or stuffy (congested) nose.  Chest discomfort.  Poor appetite.  Weakness or fatigue.  Dizziness.  Nausea or vomiting.  How is this diagnosed?  This condition may be diagnosed based on:  Your symptoms and medical history.  A physical exam.  Swabbing your nose or throat and testing the fluid for the influenza virus.  How is this treated?  If the flu is diagnosed early, you can be treated with antiviral medicine that is given by mouth (orally) or through an IV. This can help reduce how severe the illness is and how long it lasts.  Taking care of yourself at home can help relieve symptoms. Your health care provider may recommend:  Taking over-the-counter medicines.  Drinking plenty of fluids.  In many cases, the flu goes away on its own. If you have severe symptoms or complications, you may be treated in a hospital.  Follow these instructions at home:  Activity  Rest as needed and get plenty of sleep.  Stay home from work or school as told by your health care provider. Unless you are visiting your health care provider, avoid leaving home until your fever has been gone for 24 hours without taking medicine.  Eating and drinking  Take an oral rehydration solution (ORS). This is a drink that is sold at pharmacies and retail stores.  Drink enough fluid to keep your urine pale  yellow.  Drink clear fluids in small amounts as you are able. Clear fluids include water, ice chips, fruit juice mixed with water, and low-calorie sports drinks.  Eat bland, easy-to-digest foods in small amounts as you are able. These foods include bananas, applesauce, rice, lean meats, toast, and crackers.  Avoid drinking fluids that contain a lot of sugar or caffeine, such as energy drinks, regular sports drinks, and soda.  Avoid alcohol.  Avoid spicy or fatty foods.  General instructions     Take over-the-counter and prescription medicines only as told by your health care provider.  Use a cool mist humidifier to add humidity to the air in your home. This can make it easier to breathe.  When using a cool mist humidifier, clean it daily. Empty the water and replace it with clean water.  Cover your mouth and nose when you cough or sneeze.  Wash your hands with soap and water often and for at least 20 seconds, especially after you cough or sneeze. If soap and water are not available, use alcohol-based hand .  Keep all follow-up visits. This is important.  How is this prevented?  Get an annual flu shot. This is usually available in late summer, fall, or winter. Ask your health care provider when you should get your flu shot.  Avoid contact with people who are sick during cold and flu season. This is generally fall and winter.  Contact a health care provider if:  You develop new symptoms.  You have:  Chest pain.  Diarrhea.  A fever.  Your cough gets worse.  You produce more mucus.  You feel nauseous or you vomit.  Get help right away if you:  Develop shortness of breath or have difficulty breathing.  Have skin or nails that turn a bluish color.  Have severe pain or stiffness in your neck.  Develop a sudden headache or sudden pain in your face or ear.  Cannot eat or drink without vomiting.  These symptoms may represent a serious problem that is an emergency. Do not wait to see if the symptoms will go away. Get  "medical help right away. Call your local emergency services (911 in the [place] Do not drive yourself to the hospital.  Summary  Influenza, also called \"the flu,\" is a viral infection that primarily affects your respiratory tract.  Symptoms of the flu usually begin suddenly and last 4-14 days.  Getting an annual flu shot is the best way to prevent getting the flu.  Stay home from work or school as told by your health care provider. Unless you are visiting your health care provider, avoid leaving home until your fever has been gone for 24 hours without taking medicine.  Keep all follow-up visits. This is important.  This information is not intended to replace advice given to you by your health care provider. Make sure you discuss any questions you have with your health care provider.  Document Revised: 08/06/2021 Document Reviewed: 08/06/2021  Elsevier Patient Education © 2023 [employer]     "

## 2024-02-06 ENCOUNTER — TELEPHONE (OUTPATIENT)
Dept: FAMILY MEDICINE CLINIC | Facility: CLINIC | Age: 54
End: 2024-02-06
Payer: MEDICARE

## 2024-02-06 NOTE — TELEPHONE ENCOUNTER
Caller: Slime Mcelroy A    Relationship to patient: Self    Best call back number: [phone]     Chief complaint: EARS IMPACTED, NEEDS CLEANED    Type of visit: PROCEDURE    Requested date: AS SOON AS POSSIBLE       Additional notes:PATIENT ALSO REPORTS SHE IS GETTING OVER THE FLU AND STILL HAS A COUGH, WOULD LIKE SOMETHING SENT IN FOR THE COUGH TO  [facility] [id number] - [place], [place] - [address] AT [facility] [address] - [phone]  - [phone] FX          PLEASE ADVISE

## 2024-02-07 ENCOUNTER — OFFICE VISIT (OUTPATIENT)
Dept: FAMILY MEDICINE CLINIC | Facility: CLINIC | Age: 54
End: 2024-02-07
Payer: MEDICARE

## 2024-02-07 ENCOUNTER — PATIENT ROUNDING (BHMG ONLY) (OUTPATIENT)
Dept: URGENT CARE | Facility: CLINIC | Age: 54
End: 2024-02-07
Payer: MEDICARE

## 2024-02-07 VITALS
HEART RATE: 75 BPM | DIASTOLIC BLOOD PRESSURE: 67 MMHG | OXYGEN SATURATION: 96 % | HEIGHT: 64 IN | WEIGHT: 239.2 LBS | BODY MASS INDEX: 40.84 KG/M2 | TEMPERATURE: 98.3 F | SYSTOLIC BLOOD PRESSURE: 119 MMHG

## 2024-02-07 DIAGNOSIS — J10.1 INFLUENZA B: Primary | ICD-10-CM

## 2024-02-07 DIAGNOSIS — R05.1 ACUTE COUGH: ICD-10-CM

## 2024-02-07 DIAGNOSIS — H61.23 BILATERAL IMPACTED CERUMEN: ICD-10-CM

## 2024-02-07 PROCEDURE — 1159F MED LIST DOCD IN RCRD: CPT | Performed by: NURSE PRACTITIONER

## 2024-02-07 PROCEDURE — 99213 OFFICE O/P EST LOW 20 MIN: CPT | Performed by: NURSE PRACTITIONER

## 2024-02-07 PROCEDURE — 1160F RVW MEDS BY RX/DR IN RCRD: CPT | Performed by: NURSE PRACTITIONER

## 2024-02-07 RX ORDER — METHYLPREDNISOLONE ACETATE 80 MG/ML
80 INJECTION, SUSPENSION INTRA-ARTICULAR; INTRALESIONAL; INTRAMUSCULAR; SOFT TISSUE ONCE
Status: SHIPPED | OUTPATIENT
Start: 2024-02-07

## 2024-02-07 NOTE — PROGRESS NOTES
Chief Complaint  Cough and Ear Fullness (Mainly left)    Subjective          Slime Mcelroy is a 53 y.o. female who presents to [facility]    History of Present Illness    Patient was diagnosed with influenza B on February 1.  Patient reports the Tessalon Perles and the Promethazine DM are not helping her cough.  Patient use the albuterol inhaler only initially. Cough worse at night.     PHQ-2 Total Score:     PHQ-9 Total Score:          Review of Systems   Constitutional:  Negative for chills and fever.   HENT:  Negative for ear pain, postnasal drip, rhinorrhea and sore throat.    Respiratory:  Positive for cough. Negative for shortness of breath and wheezing.    Cardiovascular:  Negative for chest pain.   Musculoskeletal:  Negative for myalgias.   Skin:  Negative for rash.   Neurological:  Negative for headaches.          Medical History: has a past medical history of Allergic rhinitis (05/14/2015), Ankle pain, left, Arthralgia, Arthritis, Arthritis, rheumatoid, Arthropathy, Athlete's foot, Broken bones, Corns and callus, Fatigue, Heel pain (2000), Hemorrhoids, Hyperlipidemia, Ingrowing toenail (2014), Kidney calculus, Limb swelling, Plantar wart (1984), Primary osteoarthritis of left knee (05/01/2018), Renal calculus, and Seasonal allergies.     Surgical History: has a past surgical history that includes Colonoscopy (08/07/2019); Dilation and curettage of uterus; Dilation and curettage of uterus; Dilation and curettage of uterus; Elbow surgery; Hand surgery (Left); Other surgical history; Other surgical history; Tendon repair; Finger surgery; and Toe Nail Amputation (2014).     Family History: family history includes Arthritis in her brother, father, mother, sister, and son; Diabetes in her brother and sister; Diabetes type II in her brother and sister; Heart disease in her maternal grandfather and maternal grandmother; Osteoporosis in her mother.     Social History: reports  that she has been smoking cigarettes. She has been smoking an average of 1 pack per day. She uses smokeless tobacco. She reports that she does not drink alcohol and does not use drugs.    Allergies: Penicillins, Amoxicillin, Cephalosporins, Codeine, Diphenhydramine, and Hydrocodone-acetaminophen      There are no preventive care reminders to display for this patient.         Current Outpatient Medications:     albuterol sulfate  (90 Base) MCG/ACT inhaler, Inhale 2 puffs Every 4 (Four) Hours As Needed for Wheezing., Disp: 18 g, Rfl: 0    azelastine (ASTELIN) 0.1 % nasal spray, 2 sprays into the nostril(s) as directed by provider 2 (Two) Times a Day. Use in each nostril as directed, Disp: 30 mL, Rfl: 0    azithromycin (Zithromax) 500 MG tablet, Take 1 tablet by mouth Daily for 5 days., Disp: 5 tablet, Rfl: 0    fexofenadine (ALLEGRA) 180 MG tablet, Take 1 tablet by mouth Daily., Disp: , Rfl:     fluticasone (FLONASE) 50 MCG/ACT nasal spray, 2 sprays by Each Nare route Daily., Disp: 16 mL, Rfl: 5    gabapentin (NEURONTIN) 300 MG capsule, Take 1 capsule by mouth 3 (Three) Times a Day., Disp: 180 capsule, Rfl: 1    montelukast (SINGULAIR) 10 MG tablet, TAKE ONE TABLET BY MOUTH ONCE NIGHTLY, Disp: 90 tablet, Rfl: 1    traMADol (ULTRAM) 50 MG tablet, Take 1 tablet by mouth Every 6 (Six) Hours As Needed for Moderate Pain., Disp: 30 tablet, Rfl: 0    benzonatate (TESSALON) 100 MG capsule, Take 1 capsule by mouth 3 (Three) Times a Day As Needed for Cough. (Patient not taking: Reported on 2/7/2024), Disp: 30 capsule, Rfl: 0    promethazine-dextromethorphan (PROMETHAZINE-DM) 6.25-15 MG/5ML syrup, Take 5 mL by mouth 4 (Four) Times a Day As Needed for Cough. (Patient not taking: Reported on 2/7/2024), Disp: 180 mL, Rfl: 0    Current Facility-Administered Medications:     methylPREDNISolone acetate (DEPO-medrol) injection 80 mg, 80 mg, Intramuscular, Once, Chacha Hurst, ALYSSA      Immunization History   Administered  "Date(s) Administered    Shingrix 11/17/2021, 11/24/2021, 04/22/2022    Tdap 03/31/2016    Tetanus Toxoid, Not Adsorbed 06/21/2009         Objective       Vitals:    02/07/24 0952   BP: 119/67   Pulse: 75   Temp: 98.3 °F (36.8 °C)   TempSrc: Temporal   SpO2: 96%   Weight: 109 kg (239 lb 3.2 oz)   Height: 162.6 cm (64.02\")      Body mass index is 41.04 kg/m².   Wt Readings from Last 3 Encounters:   02/07/24 109 kg (239 lb 3.2 oz)   02/01/24 108 kg (238 lb)   11/02/23 108 kg (238 lb 9.6 oz)      BP Readings from Last 3 Encounters:   02/07/24 119/67   02/01/24 147/68   11/02/23 123/78                Physical Exam  Constitutional:       Appearance: She is well-developed. She is not ill-appearing or toxic-appearing.   HENT:      Head: Normocephalic and atraumatic.      Right Ear: Hearing, tympanic membrane, ear canal and external ear normal. No middle ear effusion. There is impacted cerumen. Tympanic membrane is not injected or erythematous.      Left Ear: Hearing, tympanic membrane, ear canal and external ear normal. A middle ear effusion is present. There is impacted cerumen. Tympanic membrane is not injected or erythematous.      Nose: No nasal deformity, mucosal edema, congestion or rhinorrhea.      Mouth/Throat:      Dentition: Normal dentition.      Pharynx: Posterior oropharyngeal erythema present. No oropharyngeal exudate.      Tonsils: No tonsillar abscesses.   Cardiovascular:      Rate and Rhythm: Normal rate and regular rhythm.   Pulmonary:      Effort: No tachypnea, bradypnea or respiratory distress.      Breath sounds: Normal breath sounds. No wheezing or rhonchi.   Skin:     General: Skin is warm and dry.   Neurological:      General: No focal deficit present.   Psychiatric:         Mood and Affect: Mood normal.         Behavior: Behavior normal.         Thought Content: Thought content normal.         Judgment: Judgment normal.             Result Review :       Common labs          5/11/2023    09:27 "   Common Labs   Glucose 92    BUN 12    Creatinine 0.86    Sodium 141    Potassium 4.9    Chloride 105    Calcium 9.4    Albumin 4.3    Total Bilirubin 0.3    Alkaline Phosphatase 106    AST (SGOT) 19    ALT (SGPT) 12    WBC 6.83    Hemoglobin 14.6    Hematocrit 42.5    Platelets 270    Total Cholesterol 160    Triglycerides 128    HDL Cholesterol 35    LDL Cholesterol  102            Ear Cerumen Removal    Date/Time: 2/7/2024 10:16 AM    Performed by: Chacha Hurst APRN  Authorized by: Chacha Hurst APRN  Location: [facility].  Patient tolerance: patient tolerated the procedure well with no immediate complications  Comments: Small amount of cerumen removed with curette [facility].   Procedure type: instrumentation   Sedation:  Patient sedated: no                  Assessment and Plan        Diagnoses and all orders for this visit:    1. Influenza B (Primary)  -     methylPREDNISolone acetate (DEPO-medrol) injection 80 mg    2. Acute cough  -     methylPREDNISolone acetate (DEPO-medrol) injection 80 mg    3. Bilateral impacted cerumen  -     Ear Cerumen Removal      Counseled on injection.     Take medication as required for pain or fever.    Diagnosis and course explained.    Increase fluids and monitor output.        Follow Up     Return if symptoms worsen or fail to improve, for Well Women Exam.    Patient was given instructions and counseling regarding her condition or for health maintenance advice. Please see specific information pulled into the AVS if appropriate.     ALYSSA Lowe

## 2024-02-07 NOTE — ED NOTES
Thank you for letting us care for you in your recent visit to our urgent care center. We would love to hear about your experience with us. Was this the first time you have visited our location?    We’re always looking for ways to make our patients’ experiences even better. Do you have any recommendations on ways we may improve?     I appreciate you taking the time to respond. Please be on the lookout for a survey about your recent visit from [employer] via text or email. We would greatly appreciate if you could fill that out and turn it back in. We want your voice to be heard and we value your feedback.   Thank you for choosing [facility] for your healthcare needs.

## 2024-04-25 DIAGNOSIS — G62.9 NEUROPATHY: ICD-10-CM

## 2024-04-25 DIAGNOSIS — M54.32 SCIATICA OF LEFT SIDE: ICD-10-CM

## 2024-04-25 RX ORDER — GABAPENTIN 300 MG/1
300 CAPSULE ORAL 3 TIMES DAILY
Qty: 180 CAPSULE | OUTPATIENT
Start: 2024-04-25

## 2024-05-28 NOTE — PROGRESS NOTES
The ABCs of the Annual Wellness Visit  Subsequent Medicare Wellness Visit    Subjective    Slime Mcelroy is a 53 y.o. female who presents for a Subsequent Medicare Wellness Visit.    The following portions of the patient's history were reviewed and   updated as appropriate: allergies, current medications, past family history, past medical history, past social history, past surgical history, and problem list.    Compared to one year ago, the patient feels her physical   health is the same.    Compared to one year ago, the patient feels her mental   health is the same.    Recent Hospitalizations:  She was not admitted to the hospital during the last year.       Current Medical Providers:  Patient Care Team:  Chacha Hurst APRN as PCP - General (Nurse Practitioner)    Outpatient Medications Prior to Visit   Medication Sig Dispense Refill    albuterol sulfate  (90 Base) MCG/ACT inhaler Inhale 2 puffs Every 4 (Four) Hours As Needed for Wheezing. 18 g 0    azelastine (ASTELIN) 0.1 % nasal spray 2 sprays into the nostril(s) as directed by provider 2 (Two) Times a Day. Use in each nostril as directed 30 mL 0    fexofenadine (ALLEGRA) 180 MG tablet Take 1 tablet by mouth Daily.      fluticasone (FLONASE) 50 MCG/ACT nasal spray 2 sprays by Each Nare route Daily. 16 mL 5    montelukast (SINGULAIR) 10 MG tablet TAKE ONE TABLET BY MOUTH ONCE NIGHTLY 90 tablet 1    traMADol (ULTRAM) 50 MG tablet Take 1 tablet by mouth Every 6 (Six) Hours As Needed for Moderate Pain. 30 tablet 0    gabapentin (NEURONTIN) 300 MG capsule Take 1 capsule by mouth 3 (Three) Times a Day. 180 capsule 1    benzonatate (TESSALON) 100 MG capsule Take 1 capsule by mouth 3 (Three) Times a Day As Needed for Cough. (Patient not taking: Reported on 2/7/2024) 30 capsule 0    promethazine-dextromethorphan (PROMETHAZINE-DM) 6.25-15 MG/5ML syrup Take 5 mL by mouth 4 (Four) Times a Day As Needed for Cough. (Patient not taking: Reported on 2/7/2024) 180  "mL 0     Facility-Administered Medications Prior to Visit   Medication Dose Route Frequency Provider Last Rate Last Admin    methylPREDNISolone acetate (DEPO-medrol) injection 80 mg  80 mg Intramuscular Once HurstJiaChachaALYSSA           Opioid medication/s are on active medication list.  and I have evaluated her active treatment plan and pain score trends (see table).  There were no vitals filed for this visit.  I have reviewed the chart for potential of high risk medication and harmful drug interactions in the elderly.            Aspirin is on active medication list. Aspirin use is not indicated based on review of current medical condition/s. Risk of harm outweighs potential benefits. Patient instructed to discontinue this medication.  .      Patient Active Problem List   Diagnosis    Hyperlipidemia    Sciatica of left side    Seasonal allergies    Corns and callus    Ankle pain, left    Arthralgia    Arthropathy, unspecified    Broken bones    Calculus of kidney    Fatigue    Ingrowing toenail    Limb swelling    Osteoarthritis of knee    Plantar wart    Seasonal allergic rhinitis    Psoriatic arthritis     Advance Care Planning   Advance Care Planning     Advance Directive is not on file.  ACP discussion was held with the patient during this visit. Patient does not have an advance directive, information provided.     Objective    Vitals:    06/06/24 0743   BP: 134/84   BP Location: Right arm   Patient Position: Sitting   Cuff Size: Large Adult   Pulse: 67   Temp: 97.8 °F (36.6 °C)   TempSrc: Temporal   SpO2: 97%   Weight: 110 kg (242 lb 3.2 oz)   Height: 162.6 cm (64.02\")     Estimated body mass index is 41.55 kg/m² as calculated from the following:    Height as of this encounter: 162.6 cm (64.02\").    Weight as of this encounter: 110 kg (242 lb 3.2 oz).    BMI is >= 25 and <30. (Overweight) The following options were offered after discussion;: exercise counseling/recommendations and nutrition " counseling/recommendations      Does the patient have evidence of cognitive impairment? No          HEALTH RISK ASSESSMENT    Smoking Status:  Social History     Tobacco Use   Smoking Status Every Day    Current packs/day: 1.00    Average packs/day: 1 pack/day for 36.4 years (36.4 ttl pk-yrs)    Types: Cigarettes    Start date:    Smokeless Tobacco Current   Tobacco Comments    She is considering quitting. She does understand the health risk of smoking.      Alcohol Consumption:  Social History     Substance and Sexual Activity   Alcohol Use Never     Fall Risk Screen:    ZULEMA Fall Risk Assessment was completed, and patient is at LOW risk for falls.Assessment completed on:2024    Depression Screenin/6/2024     7:45 AM   PHQ-2/PHQ-9 Depression Screening   Little Interest or Pleasure in Doing Things 0-->not at all   Feeling Down, Depressed or Hopeless 0-->not at all   PHQ-9: Brief Depression Severity Measure Score 0       Health Habits and Functional and Cognitive Screenin/6/2024     7:50 AM   Functional & Cognitive Status   Do you have difficulty preparing food and eating? No   Do you have difficulty bathing yourself, getting dressed or grooming yourself? No   Do you have difficulty using the toilet? No   Do you have difficulty moving around from place to place? No   Do you have trouble with steps or getting out of a bed or a chair? No   Current Diet Well Balanced Diet   Dental Exam Not up to date   Eye Exam Not up to date   Exercise (times per week) 0 times per week   Current Exercises Include No Regular Exercise   Do you need help using the phone?  No   Are you deaf or do you have serious difficulty hearing?  No   Do you need help to go to places out of walking distance? No   Do you need help shopping? No   Do you need help preparing meals?  No   Do you need help with housework?  No   Do you need help with laundry? No   Do you need help taking your medications? No   Do you need help  managing money? No   Do you ever drive or ride in a car without wearing a seat belt? No   Have you felt unusual stress, anger or loneliness in the last month? Yes   Who do you live with? Child   If you need help, do you have trouble finding someone available to you? No   Have you been bothered in the last four weeks by sexual problems? No   Do you have difficulty concentrating, remembering or making decisions? No       Age-appropriate Screening Schedule:  Refer to the list below for future screening recommendations based on patient's age, sex and/or medical conditions. Orders for these recommended tests are listed in the plan section. The patient has been provided with a written plan.    Health Maintenance   Topic Date Due    LIPID PANEL  05/11/2024    BMI FOLLOWUP  07/19/2024    INFLUENZA VACCINE  08/01/2024    PAP SMEAR  12/08/2024    ANNUAL WELLNESS VISIT  06/06/2025    MAMMOGRAM  08/21/2025    TDAP/TD VACCINES (2 - Td or Tdap) 03/31/2026    COLORECTAL CANCER SCREENING  08/07/2029    HEPATITIS C SCREENING  Completed    ZOSTER VACCINE  Completed    COVID-19 Vaccine  Discontinued    Pneumococcal Vaccine 0-64  Discontinued                  CMS Preventative Services Quick Reference  Risk Factors Identified During Encounter  None Identified  The above risks/problems have been discussed with the patient.  Pertinent information has been shared with the patient in the After Visit Summary.  An After Visit Summary and PPPS were made available to the patient.    Follow Up:   Next Medicare Wellness visit to be scheduled in 1 year.       Additional E&M Note during same encounter follows:  Patient has multiple medical problems which are significant and separately identifiable that require additional work above and beyond the Medicare Wellness Visit.      Chief Complaint  Annual Exam (MWE / Anxiety)    Subjective        HPI  Slime Mcelroy is also being seen today for     History of Present Illness  The patient presents for  "evaluation of multiple medical concerns.    The patient reports overall good health, however, she experiences significant stress due to caring for her grandchild. She has undergone Cologuard testing in the past and is uncertain about the necessity of a 5-year interval for colon cancer screening. Her physical health and mental health have remained consistent over the past year, with no hospital admissions. She does not take daily aspirin and has an advanced care plan in place. Her family is aware of her wishes. She requires a refill of her gabapentin prescription, which she takes twice daily. She reports tenderness in her axillary region, which she attributes to excessive caffeine intake. She has initiated a 6-week JumpStart program for exercise. She reports persistent fatigue.    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        Review of Systems   Constitutional:  Negative for fever.   Respiratory:  Negative for chest tightness and shortness of breath.    Cardiovascular:  Negative for chest pain and palpitations.   Gastrointestinal:  Negative for abdominal pain.   Neurological:  Positive for numbness. Negative for dizziness.       Objective   Vital Signs:  /84 (BP Location: Right arm, Patient Position: Sitting, Cuff Size: Large Adult)   Pulse 67   Temp 97.8 °F (36.6 °C) (Temporal)   Ht 162.6 cm (64.02\")   Wt 110 kg (242 lb 3.2 oz)   SpO2 97%   BMI 41.55 kg/m²     Physical Exam  Vitals reviewed.   Constitutional:       Appearance: Normal appearance. She is well-developed.   HENT:      Head: Normocephalic and atraumatic.   Eyes:      Conjunctiva/sclera: Conjunctivae normal.      Pupils: Pupils are equal, round, and reactive to light.        Comments: Raised erythematous lesion of right inner eye lid.    Cardiovascular:     "  Rate and Rhythm: Normal rate and regular rhythm.      Heart sounds: Normal heart sounds. No murmur heard.  Pulmonary:      Effort: Pulmonary effort is normal.      Breath sounds: Normal breath sounds. No wheezing or rhonchi.   Abdominal:      General: Bowel sounds are normal. There is no distension.      Palpations: Abdomen is soft.      Tenderness: There is no abdominal tenderness.   Skin:     General: Skin is warm and dry.   Neurological:      Mental Status: She is alert and oriented to person, place, and time.   Psychiatric:         Mood and Affect: Mood and affect normal.         Behavior: Behavior normal.         Thought Content: Thought content normal.         Judgment: Judgment normal.          The following data was reviewed by: ALYSSA Lowe on 06/06/2024:               Assessment and Plan   Diagnoses and all orders for this visit:    1. Medicare annual wellness visit, subsequent (Primary)    2. Screening for lipid disorders  -     Lipid Panel    3. Encounter for lipid screening for cardiovascular disease  -     Comprehensive Metabolic Panel    4. Laboratory exam ordered as part of routine general medical examination  -     CBC (No Diff)    5. Screening for thyroid disorder  -     TSH    6. Encounter for screening mammogram for malignant neoplasm of breast  -     Mammo Screening Digital Tomosynthesis Bilateral With CAD; Future    7. Neuropathy  -     gabapentin (NEURONTIN) 300 MG capsule; Take 1 capsule by mouth 3 (Three) Times a Day.  Dispense: 180 capsule; Refill: 1    8. Sciatica of left side  -     gabapentin (NEURONTIN) 300 MG capsule; Take 1 capsule by mouth 3 (Three) Times a Day.  Dispense: 180 capsule; Refill: 1    9. Skin lesion  Comments:  right eyelid.    Obtained a written consent for INDIRA dickinson. Discussed the risks and benefits of the use of controlled substances with the patient, including the risk of tolerance and drug dependence.  The patient has been counseled on the need to have  an exit strategy, including potentially discontinuing the use of controlled substances.  INDIRA has or will be reviewed as soon as it becomes available.         Follow Up   Return in about 1 year (around 6/6/2025) for Medicare Wellness.    Patient was given instructions and counseling regarding her condition or for health maintenance advice. Please see specific information pulled into the AVS if appropriate.     Updated annual wellness visit checklist.  Immunizations discussed.  Screening discussed and/or ordered.  Recommend yearly dental and eye exams. Also discussed monitoring of blood pressure and lipids.      ALYSSA Lowe

## 2024-06-06 ENCOUNTER — OFFICE VISIT (OUTPATIENT)
Dept: FAMILY MEDICINE CLINIC | Facility: CLINIC | Age: 54
End: 2024-06-06
Payer: MEDICARE

## 2024-06-06 VITALS
WEIGHT: 242.2 LBS | SYSTOLIC BLOOD PRESSURE: 134 MMHG | OXYGEN SATURATION: 97 % | BODY MASS INDEX: 41.35 KG/M2 | HEART RATE: 67 BPM | DIASTOLIC BLOOD PRESSURE: 84 MMHG | TEMPERATURE: 97.8 F | HEIGHT: 64 IN

## 2024-06-06 DIAGNOSIS — Z13.220 ENCOUNTER FOR LIPID SCREENING FOR CARDIOVASCULAR DISEASE: ICD-10-CM

## 2024-06-06 DIAGNOSIS — Z12.31 ENCOUNTER FOR SCREENING MAMMOGRAM FOR MALIGNANT NEOPLASM OF BREAST: ICD-10-CM

## 2024-06-06 DIAGNOSIS — G62.9 NEUROPATHY: ICD-10-CM

## 2024-06-06 DIAGNOSIS — M54.32 SCIATICA OF LEFT SIDE: ICD-10-CM

## 2024-06-06 DIAGNOSIS — Z13.220 SCREENING FOR LIPID DISORDERS: ICD-10-CM

## 2024-06-06 DIAGNOSIS — Z00.00 MEDICARE ANNUAL WELLNESS VISIT, SUBSEQUENT: Primary | ICD-10-CM

## 2024-06-06 DIAGNOSIS — Z00.00 LABORATORY EXAM ORDERED AS PART OF ROUTINE GENERAL MEDICAL EXAMINATION: ICD-10-CM

## 2024-06-06 DIAGNOSIS — L98.9 SKIN LESION: ICD-10-CM

## 2024-06-06 DIAGNOSIS — Z13.29 SCREENING FOR THYROID DISORDER: ICD-10-CM

## 2024-06-06 DIAGNOSIS — Z13.6 ENCOUNTER FOR LIPID SCREENING FOR CARDIOVASCULAR DISEASE: ICD-10-CM

## 2024-06-06 PROCEDURE — 1170F FXNL STATUS ASSESSED: CPT | Performed by: NURSE PRACTITIONER

## 2024-06-06 PROCEDURE — 1159F MED LIST DOCD IN RCRD: CPT | Performed by: NURSE PRACTITIONER

## 2024-06-06 PROCEDURE — 99213 OFFICE O/P EST LOW 20 MIN: CPT | Performed by: NURSE PRACTITIONER

## 2024-06-06 PROCEDURE — G0439 PPPS, SUBSEQ VISIT: HCPCS | Performed by: NURSE PRACTITIONER

## 2024-06-06 PROCEDURE — 1160F RVW MEDS BY RX/DR IN RCRD: CPT | Performed by: NURSE PRACTITIONER

## 2024-06-06 PROCEDURE — 1125F AMNT PAIN NOTED PAIN PRSNT: CPT | Performed by: NURSE PRACTITIONER

## 2024-06-06 RX ORDER — GABAPENTIN 300 MG/1
300 CAPSULE ORAL 3 TIMES DAILY
Qty: 180 CAPSULE | Refills: 1 | Status: SHIPPED | OUTPATIENT
Start: 2024-06-06

## 2024-06-06 NOTE — PATIENT INSTRUCTIONS
Preventive Care 40-64 Years Old, Female  Preventive care refers to lifestyle choices and visits with your health care provider that can promote health and wellness. Preventive care visits are also called wellness exams.  What can I expect for my preventive care visit?  Counseling  Your health care provider may ask you questions about your:  Medical history, including:  Past medical problems.  Family medical history.  Pregnancy history.  Current health, including:  Menstrual cycle.  Method of birth control.  Emotional well-being.  Home life and relationship well-being.  Sexual activity and sexual health.  Lifestyle, including:  Alcohol, nicotine or tobacco, and drug use.  Access to firearms.  Diet, exercise, and sleep habits.  Work and work environment.  Sunscreen use.  Safety issues such as seatbelt and bike helmet use.  Physical exam  Your health care provider will check your:  Height and weight. These may be used to calculate your BMI (body mass index). BMI is a measurement that tells if you are at a healthy weight.  Waist circumference. This measures the distance around your waistline. This measurement also tells if you are at a healthy weight and may help predict your risk of certain diseases, such as type 2 diabetes and high blood pressure.  Heart rate and blood pressure.  Body temperature.  Skin for abnormal spots.  What immunizations do I need?    Vaccines are usually given at various ages, according to a schedule. Your health care provider will recommend vaccines for you based on your age, medical history, and lifestyle or other factors, such as travel or where you work.  What tests do I need?  Screening  Your health care provider may recommend screening tests for certain conditions. This may include:  Lipid and cholesterol levels.  Diabetes screening. This is done by checking your blood sugar (glucose) after you have not eaten for a while (fasting).  Pelvic exam and Pap test.  Hepatitis B test.  Hepatitis C  test.  HIV (human immunodeficiency virus) test.  STI (sexually transmitted infection) testing, if you are at risk.  Lung cancer screening.  Colorectal cancer screening.  Mammogram. Talk with your health care provider about when you should start having regular mammograms. This may depend on whether you have a family history of breast cancer.  BRCA-related cancer screening. This may be done if you have a family history of breast, ovarian, tubal, or peritoneal cancers.  Bone density scan. This is done to screen for osteoporosis.  Talk with your health care provider about your test results, treatment options, and if necessary, the need for more tests.  Follow these instructions at home:  Eating and drinking    Eat a diet that includes fresh fruits and vegetables, whole grains, lean protein, and low-fat dairy products.  Take vitamin and mineral supplements as recommended by your health care provider.  Do not drink alcohol if:  Your health care provider tells you not to drink.  You are pregnant, may be pregnant, or are planning to become pregnant.  If you drink alcohol:  Limit how much you have to 0-1 drink a day.  Know how much alcohol is in your drink. In the [place] one drink equals one 12 oz bottle of beer (355 mL), one 5 oz glass of wine (148 mL), or one 1½ oz glass of hard liquor (44 mL).  Lifestyle  Brush your teeth every morning and night with fluoride toothpaste. Floss one time each day.  Exercise for at least 30 minutes 5 or more days each week.  Do not use any products that contain nicotine or tobacco. These products include cigarettes, chewing tobacco, and vaping devices, such as e-cigarettes. If you need help quitting, ask your health care provider.  Do not use drugs.  If you are sexually active, practice safe sex. Use a condom or other form of protection to prevent STIs.  If you do not wish to become pregnant, use a form of birth control. If you plan to become pregnant, see your health care provider for a  prepregnancy visit.  Take aspirin only as told by your health care provider. Make sure that you understand how much to take and what form to take. Work with your health care provider to find out whether it is safe and beneficial for you to take aspirin daily.  Find healthy ways to manage stress, such as:  Meditation, yoga, or listening to music.  Journaling.  Talking to a trusted person.  Spending time with friends and family.  Minimize exposure to UV radiation to reduce your risk of skin cancer.  Safety  Always wear your seat belt while driving or riding in a vehicle.  Do not drive:  If you have been drinking alcohol. Do not ride with someone who has been drinking.  When you are tired or distracted.  While texting.  If you have been using any mind-altering substances or drugs.  Wear a helmet and other protective equipment during sports activities.  If you have firearms in your house, make sure you follow all gun safety procedures.  Seek help if you have been physically or sexually abused.  What's next?  Visit your health care provider once a year for an annual wellness visit.  Ask your health care provider how often you should have your eyes and teeth checked.  Stay up to date on all vaccines.  This information is not intended to replace advice given to you by your health care provider. Make sure you discuss any questions you have with your health care provider.  Document Revised: 06/15/2022 Document Reviewed: 06/15/2022  [employer] Patient Education © 2024 [employer]  Advance Directive    Advance directives are legal documents that allow you to make decisions about your health care and medical treatment in case you become unable to communicate for yourself. Advance directives let your wishes be known to family, friends, and health care providers.  Discussing and writing advance directives should happen over time rather than all at once. Advance directives can be changed and updated at any time. There are  different types of advance directives, such as:  Medical power of .  Living will.  Do not resuscitate (DNR) order or do not attempt resuscitation (DNAR) order.  Health care proxy and medical power of   A health care proxy is also called a health care agent. This person is appointed to make medical decisions for you when you are unable to make decisions for yourself. Generally, people ask a trusted friend or family member to act as their proxy and represent their preferences. Make sure you have an agreement with your trusted person to act as your proxy. A proxy may have to make a medical decision on your behalf if your wishes are not known.  A medical power of , also called a durable power of  for health care, is a legal document that names your health care proxy. Depending on the laws in your state, the document may need to be:  Signed.  Notarized.  Dated.  Copied.  Witnessed.  Incorporated into your medical record.  You may also want to appoint a trusted person to manage your money in the event you are unable to do so. This is called a durable power of  for finances. It is a separate legal document from the durable power of  for health care. You may choose your health care proxy or someone different to act as your agent in money matters.  If you do not appoint a proxy, or there is a concern that the proxy is not acting in your best interest, a court may appoint a guardian to act on your behalf.  Living will  A living will is a set of instructions that state your wishes about medical care when you cannot express them yourself. Health care providers should keep a copy of your living will in your medical record. You may want to give a copy to family members or friends. To alert caregivers in case of an emergency, you can place a card in your wallet to let them know that you have a living will and where they can find it. A living will is used if you become:  Terminally  ill.  Disabled.  Unable to communicate or make decisions.  The following decisions should be included in your living will:  To use or not to use life support equipment, such as dialysis machines and breathing machines (ventilators).  Whether you want a DNR or DNAR order. This tells health care providers not to use cardiopulmonary resuscitation (CPR) if breathing or heartbeat stops.  To use or not to use tube feeding.  To be given or not to be given food and fluids.  Whether you want comfort (palliative) care when the goal becomes comfort rather than a cure.  Whether you want to donate your organs and tissues.  A living will does not give instructions for distributing your money and property if you should pass away.  DNR or DNAR  A DNR or DNAR order is a request not to have CPR in the event that your heart stops beating or you stop breathing. If a DNR or DNAR order has not been made and shared, a health care provider will try to help any patient whose heart has stopped or who has stopped breathing. If you plan to have surgery, talk with your health care provider about how your DNR or DNAR order will be followed if problems occur.  What if I do not have an advance directive?  Some states assign family decision makers to act on your behalf if you do not have an advance directive. Each state has its own laws about advance directives. You may want to check with your health care provider, , or state representative about the laws in your state.  Summary  Advance directives are legal documents that allow you to make decisions about your health care and medical treatment in case you become unable to communicate for yourself.  The process of discussing and writing advance directives should happen over time. You can change and update advance directives at any time.  Advance directives may include a medical power of , a living will, and a DNR or DNAR order.  This information is not intended to replace advice  given to you by your health care provider. Make sure you discuss any questions you have with your health care provider.  Document Revised: 09/21/2021 Document Reviewed: 09/21/2021  [employer] Patient Education © 2024 [employer]

## 2024-06-24 NOTE — PROGRESS NOTES
Chief Complaint  Numbness (Left foot; 1st two toes are numb for a little over a month.)    Subjective          Slime Mcelroy is a 53 y.o. female who presents to [facility]    History of Present Illness  History of Present Illness  The patient presents for evaluation of numbness in toes.    The patient reports experiencing numbness in her left great toe and 2nd toe, a condition that originates from the site of a previous injury. She denies any associated pain, falls, or long car trips. The onset of the numbness was sudden and has been persisting for over a month. She has sought chiropractic treatment for several months but her last adjustment was several months ago. Her bowel and bladder functions are normal.           Review of Systems   Constitutional:  Negative for fatigue and fever.   Gastrointestinal:  Negative for blood in stool, constipation and diarrhea.   Genitourinary:  Negative for difficulty urinating.   Neurological:  Positive for numbness.          Medical History: has a past medical history of Allergic rhinitis (05/14/2015), Ankle pain, left, Arthralgia, Arthritis, Arthritis, rheumatoid, Arthropathy, Athlete's foot, Broken bones, Corns and callus, Fatigue, Heel pain (2000), Hemorrhoids, Hyperlipidemia, Ingrowing toenail (2014), Kidney calculus, Limb swelling, Plantar wart (1984), Primary osteoarthritis of left knee (05/01/2018), Renal calculus, and Seasonal allergies.     Surgical History: has a past surgical history that includes Colonoscopy (08/07/2019); Dilation and curettage of uterus; Dilation and curettage of uterus; Dilation and curettage of uterus; Elbow surgery; Hand surgery (Left); Other surgical history; Other surgical history; Tendon repair; Finger surgery; and Toe Nail Amputation (2014).     Family History: family history includes Arthritis in her brother, father, mother, sister, and son; Diabetes in her brother and sister; Diabetes type II in her  "brother and sister; Heart disease in her maternal grandfather and maternal grandmother; Osteoporosis in her mother.     Social History: reports that she has been smoking cigarettes. She started smoking about 36 years ago. She has a 36.5 pack-year smoking history. She uses smokeless tobacco. She reports that she does not drink alcohol and does not use drugs.    Allergies: Penicillins, Amoxicillin, Cephalosporins, Codeine, Diphenhydramine, and Hydrocodone-acetaminophen      Health Maintenance Due   Topic Date Due    LUNG CANCER SCREENING  Never done    LIPID PANEL  05/11/2024            Current Outpatient Medications:     fexofenadine (ALLEGRA) 180 MG tablet, Take 1 tablet by mouth Daily., Disp: , Rfl:     fluticasone (FLONASE) 50 MCG/ACT nasal spray, 2 sprays by Each Nare route Daily., Disp: 16 mL, Rfl: 5    gabapentin (NEURONTIN) 300 MG capsule, Take 1 capsule by mouth 3 (Three) Times a Day., Disp: 180 capsule, Rfl: 1    montelukast (SINGULAIR) 10 MG tablet, TAKE ONE TABLET BY MOUTH ONCE NIGHTLY, Disp: 90 tablet, Rfl: 1    traMADol (ULTRAM) 50 MG tablet, Take 1 tablet by mouth Every 6 (Six) Hours As Needed for Moderate Pain., Disp: 30 tablet, Rfl: 0    Current Facility-Administered Medications:     methylPREDNISolone acetate (DEPO-medrol) injection 80 mg, 80 mg, Intramuscular, Once, Chacha Hurst, ALYSSA      Immunization History   Administered Date(s) Administered    Shingrix 11/17/2021, 11/24/2021, 04/22/2022    Tdap 03/31/2016    Tetanus Toxoid, Not Adsorbed 06/21/2009         Objective       Vitals:    06/26/24 1336   BP: 124/79   Pulse: 67   Temp: 98.1 °F (36.7 °C)   TempSrc: Temporal   SpO2: 96%   Weight: 110 kg (243 lb 6.4 oz)   Height: 162.6 cm (64.02\")      Body mass index is 41.76 kg/m².   Wt Readings from Last 3 Encounters:   06/26/24 110 kg (243 lb 6.4 oz)   06/06/24 110 kg (242 lb 3.2 oz)   02/07/24 109 kg (239 lb 3.2 oz)      BP Readings from Last 3 Encounters:   06/26/24 124/79   06/06/24 134/84 "   02/07/24 119/67             Physical Exam  Vitals reviewed.   Constitutional:       Appearance: Normal appearance. She is well-developed.   HENT:      Head: Normocephalic and atraumatic.   Eyes:      Conjunctiva/sclera: Conjunctivae normal.      Pupils: Pupils are equal, round, and reactive to light.   Cardiovascular:      Rate and Rhythm: Normal rate and regular rhythm.      Heart sounds: Normal heart sounds. No murmur heard.  Pulmonary:      Effort: Pulmonary effort is normal.      Breath sounds: Normal breath sounds. No wheezing or rhonchi.   Abdominal:      General: Bowel sounds are normal. There is no distension.      Palpations: Abdomen is soft.      Tenderness: There is no abdominal tenderness.   Skin:     General: Skin is warm and dry.   Neurological:      Mental Status: She is alert and oriented to person, place, and time.   Psychiatric:         Mood and Affect: Mood and affect normal.         Behavior: Behavior normal.         Thought Content: Thought content normal.         Judgment: Judgment normal.       Physical Exam        Result Review :   Results  Imaging  Lumbar spine x-ray in 2018 showed moderate degenerative changes and arthritis at L2-3, L4-5, and L5-S1.                        Assessment and Plan        Diagnoses and all orders for this visit:    1. Neuropathy (Primary)  -     XR Spine Lumbar Complete With Flex & Ext; Future    2. DDD (degenerative disc disease), lumbar  Assessment & Plan:  Discussed chiropractor or physical therapy, pain management and continue gabapentin.       Continue gabapentin.    Assessment & Plan  1. Numbness in toes.  The patient's symptoms are indicative of neuropathy originating from her back. An updated x-ray will be conducted.    Follow Up     Return for Next scheduled follow up.    Patient was given instructions and counseling regarding her condition or for health maintenance advice. Please see specific information pulled into the AVS if appropriate.     Chacha  ALYSSA Hurst    Patient or patient representative verbalized consent for the use of Ambient Listening during the visit with  ALYSSA Lowe for chart documentation. 6/26/2024  14:20 EDT

## 2024-06-26 ENCOUNTER — OFFICE VISIT (OUTPATIENT)
Dept: FAMILY MEDICINE CLINIC | Facility: CLINIC | Age: 54
End: 2024-06-26
Payer: MEDICARE

## 2024-06-26 VITALS
SYSTOLIC BLOOD PRESSURE: 124 MMHG | OXYGEN SATURATION: 96 % | HEIGHT: 64 IN | WEIGHT: 243.4 LBS | TEMPERATURE: 98.1 F | DIASTOLIC BLOOD PRESSURE: 79 MMHG | BODY MASS INDEX: 41.55 KG/M2 | HEART RATE: 67 BPM

## 2024-06-26 DIAGNOSIS — M51.36 DDD (DEGENERATIVE DISC DISEASE), LUMBAR: ICD-10-CM

## 2024-06-26 DIAGNOSIS — G62.9 NEUROPATHY: Primary | ICD-10-CM

## 2024-06-26 PROBLEM — M51.369 DDD (DEGENERATIVE DISC DISEASE), LUMBAR: Status: ACTIVE | Noted: 2024-06-26

## 2024-06-26 PROCEDURE — 99213 OFFICE O/P EST LOW 20 MIN: CPT | Performed by: NURSE PRACTITIONER

## 2024-06-26 PROCEDURE — 1159F MED LIST DOCD IN RCRD: CPT | Performed by: NURSE PRACTITIONER

## 2024-06-26 PROCEDURE — 1125F AMNT PAIN NOTED PAIN PRSNT: CPT | Performed by: NURSE PRACTITIONER

## 2024-06-26 PROCEDURE — 1160F RVW MEDS BY RX/DR IN RCRD: CPT | Performed by: NURSE PRACTITIONER

## 2024-07-03 ENCOUNTER — HOSPITAL ENCOUNTER (OUTPATIENT)
Dept: GENERAL RADIOLOGY | Facility: HOSPITAL | Age: 54
Discharge: HOME | End: 2024-07-03
Admitting: NURSE PRACTITIONER
Payer: MEDICARE

## 2024-07-03 DIAGNOSIS — G62.9 NEUROPATHY: ICD-10-CM

## 2024-07-03 PROCEDURE — 72114 X-RAY EXAM L-S SPINE BENDING: CPT

## 2024-07-24 DIAGNOSIS — J30.2 SEASONAL ALLERGIES: ICD-10-CM

## 2024-07-24 RX ORDER — MONTELUKAST SODIUM 10 MG/1
TABLET ORAL
Qty: 90 TABLET | Refills: 1 | Status: SHIPPED | OUTPATIENT
Start: 2024-07-24

## 2024-08-23 ENCOUNTER — HOSPITAL ENCOUNTER (OUTPATIENT)
Dept: MAMMOGRAPHY | Facility: HOSPITAL | Age: 54
Discharge: HOME | End: 2024-08-23
Admitting: NURSE PRACTITIONER
Payer: MEDICARE

## 2024-08-23 DIAGNOSIS — Z12.31 ENCOUNTER FOR SCREENING MAMMOGRAM FOR MALIGNANT NEOPLASM OF BREAST: ICD-10-CM

## 2024-08-23 PROCEDURE — 77063 BREAST TOMOSYNTHESIS BI: CPT

## 2024-08-23 PROCEDURE — 77067 SCR MAMMO BI INCL CAD: CPT

## 2024-12-03 NOTE — PROGRESS NOTES
Chief Complaint  Peripheral Neuropathy (Follow up)    Subjective          Slime Mcelroy is a 54 y.o. female who presents to [facility]    History of Present Illness  History of Present Illness  The patient is a 54-year-old female who presents for a routine follow-up.    She is currently on gabapentin for sciatic nerve pain, which affects both legs, with the right leg being more severe. The medication has been effective in managing her pain. She also takes tramadol, which aids in her daily activities.    Last week, she experienced a pounding headache for three days, which she attributes to stress from her brother's surgery and caring for her children.    She has a home blood pressure monitor and has noticed elevated readings, with systolic pressure reaching 140. Her diet consists of quick meals due to her busy schedule.    She is due for lab work and a lung cancer screening CT scan.      The PHQ has not been completed during this encounter.               Health Maintenance Due   Topic Date Due    LUNG CANCER SCREENING  Never done    LIPID PANEL  05/11/2024        Review of Systems   Constitutional:  Negative for chills, fatigue and fever.   Respiratory:  Negative for cough and shortness of breath.    Cardiovascular:  Negative for chest pain and palpitations.   Gastrointestinal:  Negative for constipation, diarrhea, nausea and vomiting.   Musculoskeletal:  Negative for back pain and neck pain.   Skin:  Negative for rash.   Neurological:  Positive for headaches. Negative for dizziness.          Medical History: has a past medical history of Allergic, Allergic rhinitis (05/14/2015), Ankle pain, left, Arthralgia, Arthritis, Arthritis, rheumatoid, Arthropathy, Athlete's foot, Broken bones, Corns and callus, Fatigue, Heel pain (2000), Hemorrhoids, Hyperlipidemia, Ingrowing toenail (2014), Kidney calculus, Limb swelling, Plantar wart (1984), Primary osteoarthritis of left knee  (05/01/2018), Renal calculus, and Seasonal allergies.     Surgical History: has a past surgical history that includes Colonoscopy (08/07/2019); Dilation and curettage of uterus; Dilation and curettage of uterus; Dilation and curettage of uterus; Elbow surgery; Hand surgery (Left); Other surgical history; Other surgical history; Tendon repair; Finger surgery; Toe Nail Amputation (2014); Breast surgery; and Joint replacement (2019).     Family History: family history includes Arthritis in her brother, father, mother, sister, and son; COPD in her brother and sister; Cancer in her brother; Depression in her sister; Diabetes in her brother and sister; Diabetes type II in her brother and sister; Hearing loss in her mother; Heart disease in her father, maternal grandfather, and maternal grandmother; Miscarriages / Stillbirths in her mother and sister; Osteoporosis in her mother.     Social History: reports that she has been smoking cigarettes. She started smoking about 36 years ago. She has a 36.9 pack-year smoking history. She uses smokeless tobacco. She reports that she does not drink alcohol and does not use drugs.    Allergies: Penicillins, Amoxicillin, Cephalosporins, Codeine, Diphenhydramine, and Hydrocodone-acetaminophen      Current Outpatient Medications:     fexofenadine (ALLEGRA) 180 MG tablet, Take 1 tablet by mouth Daily., Disp: , Rfl:     fluticasone (FLONASE) 50 MCG/ACT nasal spray, 2 sprays by Each Nare route Daily., Disp: 1 g, Rfl: 3    gabapentin (NEURONTIN) 300 MG capsule, Take 1 capsule by mouth 3 (Three) Times a Day., Disp: 180 capsule, Rfl: 1    montelukast (SINGULAIR) 10 MG tablet, Take 1 tablet by mouth Every Night., Disp: 90 tablet, Rfl: 1    traMADol (ULTRAM) 50 MG tablet, Take 1 tablet by mouth Every 6 (Six) Hours As Needed for Moderate Pain., Disp: 30 tablet, Rfl: 0    Current Facility-Administered Medications:     methylPREDNISolone acetate (DEPO-medrol) injection 80 mg, 80 mg, Intramuscular,  "Once, Chacha Hurst, ALYSSA      Immunization History   Administered Date(s) Administered    Shingrix 11/17/2021, 11/24/2021, 04/22/2022    Tdap 03/31/2016    Tetanus Toxoid, Not Adsorbed 06/21/2009         Objective       Vitals:    12/04/24 1418   BP: 137/78   Pulse: 71   Temp: 98.1 °F (36.7 °C)   TempSrc: Temporal   SpO2: 98%   Weight: 112 kg (246 lb 3.2 oz)   Height: 162.6 cm (64.02\")      Body mass index is 42.24 kg/m².   Wt Readings from Last 3 Encounters:   12/04/24 112 kg (246 lb 3.2 oz)   06/26/24 110 kg (243 lb 6.4 oz)   06/06/24 110 kg (242 lb 3.2 oz)      BP Readings from Last 3 Encounters:   12/04/24 137/78   06/26/24 124/79   06/06/24 134/84             Physical Exam  Vitals reviewed.   Constitutional:       Appearance: Normal appearance. She is well-developed.   HENT:      Head: Normocephalic and atraumatic.   Eyes:      Conjunctiva/sclera: Conjunctivae normal.      Pupils: Pupils are equal, round, and reactive to light.   Cardiovascular:      Rate and Rhythm: Normal rate and regular rhythm.      Heart sounds: Normal heart sounds. No murmur heard.  Pulmonary:      Effort: Pulmonary effort is normal.      Breath sounds: Normal breath sounds. No wheezing or rhonchi.   Abdominal:      General: Bowel sounds are normal. There is no distension.      Palpations: Abdomen is soft.      Tenderness: There is no abdominal tenderness.   Skin:     General: Skin is warm and dry.   Neurological:      Mental Status: She is alert and oriented to person, place, and time.   Psychiatric:         Mood and Affect: Mood and affect normal.         Behavior: Behavior normal.         Thought Content: Thought content normal.         Judgment: Judgment normal.         Physical Exam        Result Review :   Results                    Assessment and Plan        Diagnoses and all orders for this visit:    1. Chronic left-sided low back pain with left-sided sciatica (Primary)  -     traMADol (ULTRAM) 50 MG tablet; Take 1 tablet by " mouth Every 6 (Six) Hours As Needed for Moderate Pain.  Dispense: 30 tablet; Refill: 0    2. Seasonal allergies  -     fluticasone (FLONASE) 50 MCG/ACT nasal spray; 2 sprays by Each Nare route Daily.  Dispense: 1 g; Refill: 3  -     montelukast (SINGULAIR) 10 MG tablet; Take 1 tablet by mouth Every Night.  Dispense: 90 tablet; Refill: 1    3. Neuropathy  -     gabapentin (NEURONTIN) 300 MG capsule; Take 1 capsule by mouth 3 (Three) Times a Day.  Dispense: 180 capsule; Refill: 1    4. Sciatica of left side  -     gabapentin (NEURONTIN) 300 MG capsule; Take 1 capsule by mouth 3 (Three) Times a Day.  Dispense: 180 capsule; Refill: 1    5. Personal history of nicotine dependence  -      CT Chest Low Dose Cancer Screening WO; Future    6. Encounter for medication monitoring  -     POC Medline 12 Panel Urine Drug Screen    7. Elevated blood pressure reading without diagnosis of hypertension    Patient advised to monitor blood pressure at home and journal readings.  Patient informed that a blood pressure reading at home of more than 130/80 is considered hypertension.  For readings greater than 140/90 or higher patient is advised to follow-up in the office with readings for management.  Patient advised to limit sodium intake.    Assessment & Plan  1. Sciatic Nerve Pain.  She reports that gabapentin is helping with her sciatic nerve pain. A refill of gabapentin was provided.    2. Elevated Blood Pressure.  She experienced a headache for three days, likely due to stress, and recorded a blood pressure reading of 140. She was advised to monitor her blood pressure at home three times a week, at varying times of the day, and to record the readings. If she records three readings above 135/85, initiation of blood pressure medication will be considered. She was also advised to reduce salt intake and avoid prepackaged foods.    3. Back Pain.  She reports that tramadol is helping with her back pain, allowing her to complete her daily  activities. A urine drug screen will be performed for tramadol, which will also be refilled.    4. Health Maintenance.  A physical panel will be conducted at the outpatient lab. A lung cancer screening CT will be scheduled.    Obtained a written consent for INDIRA query. Discussed the risks and benefits of the use of controlled substances with the patient, including the risk of tolerance and drug dependence.  The patient has been counseled on the need to have an exit strategy, including potentially discontinuing the use of controlled substances.  INDIRA has or will be reviewed as soon as it becomes available.    Return in about 6 months (around 6/4/2025) for Keep follow up as scheduled.    Patient was given instructions and counseling regarding her condition or for health maintenance advice. Please see specific information pulled into the AVS if appropriate.     ALYSSA Lowe    Patient or patient representative verbalized consent for the use of Ambient Listening during the visit with  ALYSSA Lowe for chart documentation. 12/5/2024  08:14 EST

## 2024-12-04 ENCOUNTER — OFFICE VISIT (OUTPATIENT)
Dept: FAMILY MEDICINE CLINIC | Facility: CLINIC | Age: 54
End: 2024-12-04
Payer: MEDICARE

## 2024-12-04 VITALS
HEART RATE: 71 BPM | OXYGEN SATURATION: 98 % | TEMPERATURE: 98.1 F | SYSTOLIC BLOOD PRESSURE: 137 MMHG | WEIGHT: 246.2 LBS | BODY MASS INDEX: 42.03 KG/M2 | DIASTOLIC BLOOD PRESSURE: 78 MMHG | HEIGHT: 64 IN

## 2024-12-04 DIAGNOSIS — M54.32 SCIATICA OF LEFT SIDE: ICD-10-CM

## 2024-12-04 DIAGNOSIS — R03.0 ELEVATED BLOOD PRESSURE READING WITHOUT DIAGNOSIS OF HYPERTENSION: ICD-10-CM

## 2024-12-04 DIAGNOSIS — M54.42 CHRONIC LEFT-SIDED LOW BACK PAIN WITH LEFT-SIDED SCIATICA: Primary | ICD-10-CM

## 2024-12-04 DIAGNOSIS — Z87.891 PERSONAL HISTORY OF NICOTINE DEPENDENCE: ICD-10-CM

## 2024-12-04 DIAGNOSIS — Z51.81 ENCOUNTER FOR MEDICATION MONITORING: ICD-10-CM

## 2024-12-04 DIAGNOSIS — G62.9 NEUROPATHY: ICD-10-CM

## 2024-12-04 DIAGNOSIS — J30.2 SEASONAL ALLERGIES: ICD-10-CM

## 2024-12-04 DIAGNOSIS — G89.29 CHRONIC LEFT-SIDED LOW BACK PAIN WITH LEFT-SIDED SCIATICA: Primary | ICD-10-CM

## 2024-12-04 LAB
AMPHET+METHAMPHET UR QL: NEGATIVE
AMPHETAMINE INTERNAL CONTROL: NORMAL
AMPHETAMINES UR QL: NEGATIVE
BARBITURATE INTERNAL CONTROL: NORMAL
BARBITURATES UR QL SCN: NEGATIVE
BENZODIAZ UR QL SCN: NEGATIVE
BENZODIAZEPINE INTERNAL CONTROL: NORMAL
BUPRENORPHINE INTERNAL CONTROL: NORMAL
BUPRENORPHINE SERPL-MCNC: NEGATIVE NG/ML
CANNABINOIDS SERPL QL: NEGATIVE
COCAINE INTERNAL CONTROL: NORMAL
COCAINE UR QL: NEGATIVE
EXPIRATION DATE: NORMAL
Lab: NORMAL
MDMA (ECSTASY) INTERNAL CONTROL: NORMAL
MDMA UR QL SCN: NEGATIVE
METHADONE INTERNAL CONTROL: NORMAL
METHADONE UR QL SCN: NEGATIVE
METHAMPHETAMINE INTERNAL CONTROL: NORMAL
MORPHINE INTERNAL CONTROL: NORMAL
MORPHINE/OPIATES SCREEN, URINE: NEGATIVE
OXYCODONE INTERNAL CONTROL: NORMAL
OXYCODONE UR QL SCN: NEGATIVE
PCP UR QL SCN: NEGATIVE
PHENCYCLIDINE INTERNAL CONTROL: NORMAL
THC INTERNAL CONTROL: NORMAL

## 2024-12-04 RX ORDER — GABAPENTIN 300 MG/1
300 CAPSULE ORAL 3 TIMES DAILY
Qty: 180 CAPSULE | Refills: 1 | Status: SHIPPED | OUTPATIENT
Start: 2024-12-04

## 2024-12-04 RX ORDER — MONTELUKAST SODIUM 10 MG/1
10 TABLET ORAL NIGHTLY
Qty: 90 TABLET | Refills: 1 | Status: SHIPPED | OUTPATIENT
Start: 2024-12-04

## 2024-12-04 RX ORDER — TRAMADOL HYDROCHLORIDE 50 MG/1
50 TABLET ORAL EVERY 6 HOURS PRN
Qty: 30 TABLET | Refills: 0 | Status: SHIPPED | OUTPATIENT
Start: 2024-12-04

## 2024-12-04 RX ORDER — FLUTICASONE PROPIONATE 50 MCG
2 SPRAY, SUSPENSION (ML) NASAL DAILY
Qty: 1 G | Refills: 3 | Status: SHIPPED | OUTPATIENT
Start: 2024-12-04

## 2024-12-10 ENCOUNTER — OFFICE VISIT (OUTPATIENT)
Dept: FAMILY MEDICINE CLINIC | Facility: CLINIC | Age: 54
End: 2024-12-10
Payer: MEDICARE

## 2024-12-10 VITALS
DIASTOLIC BLOOD PRESSURE: 66 MMHG | WEIGHT: 248 LBS | HEIGHT: 64 IN | BODY MASS INDEX: 42.34 KG/M2 | OXYGEN SATURATION: 98 % | TEMPERATURE: 97.2 F | HEART RATE: 74 BPM | SYSTOLIC BLOOD PRESSURE: 121 MMHG

## 2024-12-10 DIAGNOSIS — M79.674 PAIN OF TOE OF RIGHT FOOT: Primary | ICD-10-CM

## 2024-12-10 PROCEDURE — 1159F MED LIST DOCD IN RCRD: CPT | Performed by: STUDENT IN AN ORGANIZED HEALTH CARE EDUCATION/TRAINING PROGRAM

## 2024-12-10 PROCEDURE — 1160F RVW MEDS BY RX/DR IN RCRD: CPT | Performed by: STUDENT IN AN ORGANIZED HEALTH CARE EDUCATION/TRAINING PROGRAM

## 2024-12-10 PROCEDURE — 99213 OFFICE O/P EST LOW 20 MIN: CPT | Performed by: STUDENT IN AN ORGANIZED HEALTH CARE EDUCATION/TRAINING PROGRAM

## 2024-12-10 PROCEDURE — 1125F AMNT PAIN NOTED PAIN PRSNT: CPT | Performed by: STUDENT IN AN ORGANIZED HEALTH CARE EDUCATION/TRAINING PROGRAM

## 2024-12-10 NOTE — PROGRESS NOTES
"Chief Complaint  Toe Pain (Right pinky toe, X1wk)    Subjective      Slime Mcelroy is a 54 y.o. female who presents to [facility]     History of Present Illness      The patient presents for evaluation of callus formation on her toe and elevated blood pressure.    She reports experiencing tenderness in her toenail, accompanied by a burning sensation during ambulation. She also mentions the presence of a hard, protruding mass on her toe, which she initially suspected to be a wart. This symptom began abruptly. She has a history of multiple toe fractures, with all toes except one having been fractured at least three times. S  During her last visit wit her blood pressure was slightly elevated but not at the borderline level. She was advised to monitor her blood pressure three times a day for a week. She has been using a wrist blood pressure monitor and reports that her readings have consistently been within the normal range. However, she notes a discrepancy between the readings taken by the nurse and those recorded by her device, with the nurse's reading being 121/70 and her device showing 157 systolic today in clinic.       Patient Care Team:  Chacha Hurst APRN as PCP - General (Nurse Practitioner)      Review of Systems   Musculoskeletal:  Positive for arthralgias.        Toe pain         Objective   Vital Signs:   Vitals:    12/10/24 1519   BP: 121/66   BP Location: Left arm   Patient Position: Sitting   Cuff Size: Adult   Pulse: 74   Temp: 97.2 °F (36.2 °C)   TempSrc: Temporal   SpO2: 98%   Weight: 112 kg (248 lb)   Height: 162.6 cm (64.02\")     Body mass index is 42.55 kg/m².    Wt Readings from Last 3 Encounters:   12/10/24 112 kg (248 lb)   12/04/24 112 kg (246 lb 3.2 oz)   06/26/24 110 kg (243 lb 6.4 oz)     BP Readings from Last 3 Encounters:   12/10/24 121/66   12/04/24 137/78   06/26/24 124/79       Health Maintenance   Topic Date Due    LUNG CANCER SCREENING  Never " done    LIPID PANEL  05/11/2024    PAP SMEAR  12/08/2024    INFLUENZA VACCINE  03/31/2025 (Originally 7/1/2024)    ANNUAL WELLNESS VISIT  06/06/2025    BMI FOLLOWUP  06/06/2025    TDAP/TD VACCINES (2 - Td or Tdap) 03/31/2026    MAMMOGRAM  08/23/2026    COLORECTAL CANCER SCREENING  08/07/2029    HEPATITIS C SCREENING  Completed    ZOSTER VACCINE  Completed    COVID-19 Vaccine  Discontinued    Pneumococcal Vaccine 0-64  Discontinued       Physical Exam  Constitutional:       Appearance: Normal appearance.   HENT:      Head: Normocephalic and atraumatic.   Cardiovascular:      Pulses: Normal pulses.      Heart sounds: Normal heart sounds.   Pulmonary:      Effort: Pulmonary effort is normal.      Breath sounds: Normal breath sounds.   Musculoskeletal:      Comments: Hard callus formation with tenderness over the medial aspect of right fifth toe   Neurological:      General: No focal deficit present.      Mental Status: She is alert and oriented to person, place, and time.   Psychiatric:         Mood and Affect: Mood normal.         Behavior: Behavior normal.         Physical Exam  Lungs are clear.  Heart sounds are regular.       Result Review   The following data was reviewed by: Marlin Lux MD on 12/10/2024:  [x]  Tests & Results  []  Hospitalization/Emergency Department/Urgent Care  []  Internal/External Consultant Notes      Results         ASSESSMENT/PLAN  Diagnoses and all orders for this visit:    1. Pain of toe of right foot (Primary)          Assessment & Plan      1. Callus formation on the toe.  The symptoms do not align with an ingrown toenail or infection, but rather suggest a hard callus formation likely due to footwear. The callus is causing discomfort . There is no sign of infection. It is unlikely to be a wart given the location. She is advised to undergo a pedicure to remove the dead tissue. If the issue persists post-pedicure, further evaluation will be conducted to determine if it is a wart,  which can then be treated with cryotherapy.    2. Elevated blood pressure.  Her blood pressure readings have been inconsistent, with a recent reading of 157 systolic using a wrist monitor, She is advised to switch to an arm blood pressure monitor for more accurate readings.        FOLLOW UP  Return if symptoms worsen or fail to improve.  Patient was given instructions and counseling regarding her condition or for health maintenance advice. Please see specific information pulled into the AVS if appropriate.       Marlin Lux MD  12/10/24  15:55 EST    Patient or patient representative verbalized consent for the use of Ambient Listening during the visit with  Marlin Lux MD for chart documentation. 12/10/2024  15:55 EST

## 2024-12-18 ENCOUNTER — HOSPITAL ENCOUNTER (OUTPATIENT)
Dept: CT IMAGING | Facility: HOSPITAL | Age: 54
Discharge: HOME | End: 2024-12-18
Payer: MEDICARE

## 2024-12-18 ENCOUNTER — LAB (OUTPATIENT)
Dept: LAB | Facility: HOSPITAL | Age: 54
End: 2024-12-18
Payer: MEDICARE

## 2024-12-18 DIAGNOSIS — Z87.891 PERSONAL HISTORY OF NICOTINE DEPENDENCE: ICD-10-CM

## 2024-12-18 LAB
ALBUMIN SERPL-MCNC: 3.9 G/DL (ref 3.5–5.2)
ALBUMIN/GLOB SERPL: 1.1 G/DL
ALP SERPL-CCNC: 108 U/L (ref 39–117)
ALT SERPL W P-5'-P-CCNC: 12 U/L (ref 1–33)
ANION GAP SERPL CALCULATED.3IONS-SCNC: 8.9 MMOL/L (ref 5–15)
AST SERPL-CCNC: 20 U/L (ref 1–32)
BILIRUB SERPL-MCNC: 0.2 MG/DL (ref 0–1.2)
BUN SERPL-MCNC: 10 MG/DL (ref 6–20)
BUN/CREAT SERPL: 12.2 (ref 7–25)
CALCIUM SPEC-SCNC: 9 MG/DL (ref 8.6–10.5)
CHLORIDE SERPL-SCNC: 105 MMOL/L (ref 98–107)
CHOLEST SERPL-MCNC: 152 MG/DL (ref 0–200)
CO2 SERPL-SCNC: 24.1 MMOL/L (ref 22–29)
CREAT SERPL-MCNC: 0.82 MG/DL (ref 0.57–1)
DEPRECATED RDW RBC AUTO: 39.6 FL (ref 37–54)
EGFRCR SERPLBLD CKD-EPI 2021: 84.1 ML/MIN/1.73
ERYTHROCYTE [DISTWIDTH] IN BLOOD BY AUTOMATED COUNT: 12.2 % (ref 12.3–15.4)
GLOBULIN UR ELPH-MCNC: 3.7 GM/DL
GLUCOSE SERPL-MCNC: 97 MG/DL (ref 65–99)
HCT VFR BLD AUTO: 41.6 % (ref 34–46.6)
HDLC SERPL-MCNC: 33 MG/DL (ref 40–60)
HGB BLD-MCNC: 13.8 G/DL (ref 12–15.9)
LDLC SERPL CALC-MCNC: 92 MG/DL (ref 0–100)
LDLC/HDLC SERPL: 2.7 {RATIO}
MCH RBC QN AUTO: 29.4 PG (ref 26.6–33)
MCHC RBC AUTO-ENTMCNC: 33.2 G/DL (ref 31.5–35.7)
MCV RBC AUTO: 88.7 FL (ref 79–97)
PLATELET # BLD AUTO: 286 10*3/MM3 (ref 140–450)
PMV BLD AUTO: 11.6 FL (ref 6–12)
POTASSIUM SERPL-SCNC: 4 MMOL/L (ref 3.5–5.2)
PROT SERPL-MCNC: 7.6 G/DL (ref 6–8.5)
RBC # BLD AUTO: 4.69 10*6/MM3 (ref 3.77–5.28)
SODIUM SERPL-SCNC: 138 MMOL/L (ref 136–145)
TRIGL SERPL-MCNC: 150 MG/DL (ref 0–150)
TSH SERPL DL<=0.05 MIU/L-ACNC: 3.46 UIU/ML (ref 0.27–4.2)
VLDLC SERPL-MCNC: 27 MG/DL (ref 5–40)
WBC NRBC COR # BLD AUTO: 7.23 10*3/MM3 (ref 3.4–10.8)

## 2024-12-18 PROCEDURE — 80061 LIPID PANEL: CPT | Performed by: NURSE PRACTITIONER

## 2024-12-18 PROCEDURE — 71271 CT THORAX LUNG CANCER SCR C-: CPT

## 2024-12-18 PROCEDURE — 85027 COMPLETE CBC AUTOMATED: CPT | Performed by: NURSE PRACTITIONER

## 2024-12-18 PROCEDURE — 80053 COMPREHEN METABOLIC PANEL: CPT | Performed by: NURSE PRACTITIONER

## 2024-12-18 PROCEDURE — 84443 ASSAY THYROID STIM HORMONE: CPT | Performed by: NURSE PRACTITIONER

## 2024-12-20 DIAGNOSIS — Z87.891 PERSONAL HISTORY OF NICOTINE DEPENDENCE: ICD-10-CM

## 2024-12-20 DIAGNOSIS — Z12.2 SCREENING FOR LUNG CANCER: Primary | ICD-10-CM

## 2024-12-31 ENCOUNTER — OFFICE VISIT (OUTPATIENT)
Dept: FAMILY MEDICINE CLINIC | Facility: CLINIC | Age: 54
End: 2024-12-31
Payer: MEDICARE

## 2024-12-31 VITALS
WEIGHT: 248 LBS | BODY MASS INDEX: 42.34 KG/M2 | HEIGHT: 64 IN | DIASTOLIC BLOOD PRESSURE: 71 MMHG | HEART RATE: 77 BPM | SYSTOLIC BLOOD PRESSURE: 145 MMHG | TEMPERATURE: 98.5 F | OXYGEN SATURATION: 97 %

## 2024-12-31 DIAGNOSIS — H66.91 OTITIS OF RIGHT EAR: Primary | ICD-10-CM

## 2024-12-31 PROCEDURE — 99214 OFFICE O/P EST MOD 30 MIN: CPT | Performed by: STUDENT IN AN ORGANIZED HEALTH CARE EDUCATION/TRAINING PROGRAM

## 2024-12-31 PROCEDURE — 1125F AMNT PAIN NOTED PAIN PRSNT: CPT | Performed by: STUDENT IN AN ORGANIZED HEALTH CARE EDUCATION/TRAINING PROGRAM

## 2024-12-31 RX ORDER — AZITHROMYCIN 250 MG/1
TABLET, FILM COATED ORAL
Qty: 6 TABLET | Refills: 0 | Status: SHIPPED | OUTPATIENT
Start: 2024-12-31

## 2024-12-31 NOTE — PROGRESS NOTES
"Chief Complaint  Earache (Right ear pain ) and Toe Pain (RIGHT PINKY TOE PAIN been going on a few months )    Subjective      Slime Mcelroy is a 54 y.o. female who presents to [facility]     Same day visit:   Earache (Right ear pain ) and Toe Pain (RIGHT PINKY TOE PAIN been going on a few months )  On physical exam - unable to fully appreciate the tympanic membrane due to wax. Pt with allergies to penicillins. Will treat with azithromycin. If symptoms worsen go to the ED.     Objective   Vital Signs:   Vitals:    12/31/24 1014   BP: 145/71   Pulse: 77   Temp: 98.5 °F (36.9 °C)   TempSrc: Temporal   SpO2: 97%   Weight: 112 kg (248 lb)   Height: 162.6 cm (64.02\")     Body mass index is 42.55 kg/m².    Wt Readings from Last 3 Encounters:   12/31/24 112 kg (248 lb)   12/10/24 112 kg (248 lb)   12/04/24 112 kg (246 lb 3.2 oz)     BP Readings from Last 3 Encounters:   12/31/24 145/71   12/10/24 121/66   12/04/24 137/78       Health Maintenance   Topic Date Due    PAP SMEAR  12/08/2024    INFLUENZA VACCINE  03/31/2025 (Originally 7/1/2024)    ANNUAL WELLNESS VISIT  06/06/2025    BMI FOLLOWUP  06/06/2025    LIPID PANEL  12/18/2025    LUNG CANCER SCREENING  12/18/2025    TDAP/TD VACCINES (2 - Td or Tdap) 03/31/2026    MAMMOGRAM  08/23/2026    COLORECTAL CANCER SCREENING  08/07/2029    HEPATITIS C SCREENING  Completed    ZOSTER VACCINE  Completed    COVID-19 Vaccine  Discontinued    Pneumococcal Vaccine 0-64  Discontinued       Physical Exam  Vitals reviewed.   Constitutional:       Comments: Bilateral wax impaction.    HENT:      Head: Normocephalic.      Mouth/Throat:      Mouth: Mucous membranes are moist.   Eyes:      Pupils: Pupils are equal, round, and reactive to light.   Cardiovascular:      Rate and Rhythm: Normal rate.   Abdominal:      General: Abdomen is flat.   Musculoskeletal:         General: Normal range of motion.      Cervical back: Normal range of motion.   Skin:     " General: Skin is warm.      Capillary Refill: Capillary refill takes less than 2 seconds.   Neurological:      Mental Status: She is alert.            Assessment & Plan  Otitis of right ear  Possible otitis  Empiric treatment   Was impaction  She might benefit from ear wax removal after symptoms resolved.   Orders:    azithromycin (Zithromax Z-Sony) 250 MG tablet; Take 2 tablets by mouth on day 1, then 1 tablet daily on days 2-5               I spent 20 minutes caring for Slime on this date of service. This time includes time spent by me in the following activities:preparing for the visit, reviewing tests, obtaining and/or reviewing a separately obtained history, performing a medically appropriate examination and/or evaluation, counseling and educating the patient/family/caregiver, ordering medications, tests, or procedures, referring and communicating with other health care professionals, documenting information in the medical record, independently interpreting results and communicating that information with the patient/family/caregiver, care coordination.    FOLLOW UP  No follow-ups on file.  Patient was given instructions and counseling regarding her condition or for health maintenance advice. Please see specific information pulled into the AVS if appropriate.       Evan Menjivar MD  12/31/24  10:31 EST    CURRENT & DISCONTINUED MEDICATIONS  Current Outpatient Medications   Medication Instructions    azithromycin (Zithromax Z-Sony) 250 MG tablet Take 2 tablets by mouth on day 1, then 1 tablet daily on days 2-5    fexofenadine (ALLEGRA) 180 mg, Daily    fluticasone (FLONASE) 50 MCG/ACT nasal spray 2 sprays, Each Nare, Daily    gabapentin (NEURONTIN) 300 mg, Oral, 3 Times Daily    montelukast (SINGULAIR) 10 mg, Oral, Nightly    traMADol (ULTRAM) 50 mg, Oral, Every 6 Hours PRN       There are no discontinued medications.

## 2025-06-09 NOTE — PROGRESS NOTES
Subjective   The ABCs of the Annual Wellness Visit  Medicare Wellness Visit      Slime Mcelroy is a 54 y.o. patient who presents for a Medicare Wellness Visit.    The following portions of the patient's history were reviewed and   updated as appropriate: allergies, current medications, past family history, past medical history, past social history, past surgical history, and problem list.    Compared to one year ago, the patient's physical   health is the same.  Compared to one year ago, the patient's mental   health is better.    Recent Hospitalizations:  She was not admitted to the hospital during the last year.     Current Medical Providers:  Patient Care Team:  Chacha Hurst APRN as PCP - General (Nurse Practitioner)    Outpatient Medications Prior to Visit   Medication Sig Dispense Refill    fexofenadine (ALLEGRA) 180 MG tablet Take 1 tablet by mouth Daily.      fluticasone (FLONASE) 50 MCG/ACT nasal spray 2 sprays by Each Nare route Daily. 1 g 3    gabapentin (NEURONTIN) 300 MG capsule Take 1 capsule by mouth 3 (Three) Times a Day. 180 capsule 1    montelukast (SINGULAIR) 10 MG tablet Take 1 tablet by mouth Every Night. 90 tablet 1    traMADol (ULTRAM) 50 MG tablet Take 1 tablet by mouth Every 6 (Six) Hours As Needed for Moderate Pain. 30 tablet 0    azithromycin (Zithromax Z-Sony) 250 MG tablet Take 2 tablets by mouth on day 1, then 1 tablet daily on days 2-5 6 tablet 0     Facility-Administered Medications Prior to Visit   Medication Dose Route Frequency Provider Last Rate Last Admin    methylPREDNISolone acetate (DEPO-medrol) injection 80 mg  80 mg Intramuscular Once Chacha Hurst APRN         Opioid medication/s are on active medication list.  and I have evaluated her active treatment plan and pain score trends (see table).  Vitals:    06/12/25 0732   PainSc: 2    PainLoc: Back     I have reviewed the chart for potential of high risk medication and harmful drug interactions in the  "elderly.        Aspirin is not on active medication list.  Aspirin use is not indicated based on review of current medical condition/s. Risk of harm outweighs potential benefits.  .    Patient Active Problem List   Diagnosis    Hyperlipidemia    Sciatica of left side    Seasonal allergies    Corns and callus    Ankle pain, left    Arthralgia    Arthropathy, unspecified    Broken bones    Calculus of kidney    Fatigue    Ingrowing toenail    Limb swelling    Osteoarthritis of knee    Plantar wart    Seasonal allergic rhinitis    Psoriatic arthritis    DDD (degenerative disc disease), lumbar     Advance Care Planning Advance Directive is on file.  ACP discussion was held with the patient during this visit. Patient has an advance directive in EMR which is still valid.             Objective   Vitals:    06/12/25 0732   BP: 132/84   Pulse: 64   Temp: 98.1 °F (36.7 °C)   TempSrc: Temporal   SpO2: 97%   Weight: 113 kg (250 lb 3.2 oz)   Height: 162.6 cm (64.02\")   PainSc: 2    PainLoc: Back       Estimated body mass index is 42.93 kg/m² as calculated from the following:    Height as of this encounter: 162.6 cm (64.02\").    Weight as of this encounter: 113 kg (250 lb 3.2 oz).                Does the patient have evidence of cognitive impairment? No                                                                                                Health  Risk Assessment    Smoking Status:  Social History     Tobacco Use   Smoking Status Every Day    Current packs/day: 1.00    Average packs/day: 1 pack/day for 37.4 years (37.4 ttl pk-yrs)    Types: Cigarettes    Start date: 1988   Smokeless Tobacco Current   Tobacco Comments    She is considering quitting. She does understand the health risk of smoking.      Alcohol Consumption:  Social History     Substance and Sexual Activity   Alcohol Use Never       Fall Risk Screen  STEADI Fall Risk Assessment was completed, and patient is at LOW risk for falls.Assessment completed " on:2025    Depression Screening   Little interest or pleasure in doing things? Not at all   Feeling down, depressed, or hopeless? Not at all   PHQ-2 Total Score 0      Health Habits and Functional and Cognitive Screenin/10/2025     7:43 AM   Functional & Cognitive Status   Do you have difficulty preparing food and eating? No   Do you have difficulty bathing yourself, getting dressed or grooming yourself? No   Do you have difficulty using the toilet? No   Do you have difficulty moving around from place to place? No   Do you have trouble with steps or getting out of a bed or a chair? No   Current Diet Unhealthy Diet   Dental Exam Not up to date   Eye Exam Not up to date   Exercise (times per week) 0 times per week   Current Exercises Include No Regular Exercise   Do you need help using the phone?  No   Are you deaf or do you have serious difficulty hearing?  No   Do you need help to go to places out of walking distance? No   Do you need help shopping? No   Do you need help preparing meals?  No   Do you need help with housework?  No   Do you need help with laundry? No   Do you need help taking your medications? No   Do you need help managing money? No   Do you ever drive or ride in a car without wearing a seat belt? No   Have you felt unusual stress, anger or loneliness in the last month? No   Who do you live with? Child   If you need help, do you have trouble finding someone available to you? No   Have you been bothered in the last four weeks by sexual problems? No   Do you have difficulty concentrating, remembering or making decisions? No           Age-appropriate Screening Schedule:  Refer to the list below for future screening recommendations based on patient's age, sex and/or medical conditions. Orders for these recommended tests are listed in the plan section. The patient has been provided with a written plan.    Health Maintenance List  Health Maintenance   Topic Date Due    PAP SMEAR  2024     Pneumococcal Vaccine 50+ (1 of 2 - PCV) 12/09/2025 (Originally 11/17/1989)    INFLUENZA VACCINE  07/01/2025    LIPID PANEL  12/18/2025    LUNG CANCER SCREENING  12/18/2025    TDAP/TD VACCINES (2 - Td or Tdap) 03/31/2026    ANNUAL WELLNESS VISIT  06/12/2026    MAMMOGRAM  08/23/2026    COLORECTAL CANCER SCREENING  08/07/2029    HEPATITIS C SCREENING  Completed    ZOSTER VACCINE  Completed    COVID-19 Vaccine  Discontinued                                                                                                                                                CMS Preventative Services Quick Reference  Risk Factors Identified During Encounter  Vision Screening Recommended    The above risks/problems have been discussed with the patient.  Pertinent information has been shared with the patient in the After Visit Summary.  An After Visit Summary and PPPS were made available to the patient.    Follow Up:   Next Medicare Wellness visit to be scheduled in 1 year.          Additional E&M Note during same encounter follows:  Patient has multiple medical problems which are significant and separately identifiable that require additional work above and beyond the Medicare Wellness Visit.      Chief Complaint  Medicare Wellness-subsequent and Skin Problem (Has a spot on right side right where bra line hits, been there several months.)    Slime Mcelroy is a 54 y.o. female who presents to [facility]     History of Present Illness  The patient presents for evaluation of a spot on her breast, back pain, and allergies.    She has been experiencing a persistent spot on her breast for several months, which is exacerbated by the friction from her bra. The area becomes red and itchy, leading her to switch to a sports bra for comfort. The irritation worsens with movement and contact with her regular bra. She reports no chest pain or shortness of breath. Her bowel and bladder functions are  "normal.    She continues her gabapentin regimen, which provides intermittent relief for her back pain and facilitates her daily activities. She is seeking a refill of her tramadol prescription, which she uses biweekly as needed for severe back pain. She was awakened by back pain at 4:20 this morning.    She also requests refills of Flonase and Singulair, which effectively manage her allergy symptoms.    She has noticed the development of spots on her skin, which become more pronounced upon scratching.    She does not take baby aspirin. She has a living will in place. She is due for an eye exam.    Objective   Vital Signs:   Vitals:    06/12/25 0732   BP: 132/84   Pulse: 64   Temp: 98.1 °F (36.7 °C)   TempSrc: Temporal   SpO2: 97%   Weight: 113 kg (250 lb 3.2 oz)   Height: 162.6 cm (64.02\")   PainSc: 2    PainLoc: Back       Wt Readings from Last 3 Encounters:   06/12/25 113 kg (250 lb 3.2 oz)   12/31/24 112 kg (248 lb)   12/10/24 112 kg (248 lb)     BP Readings from Last 3 Encounters:   06/12/25 132/84   12/31/24 145/71   12/10/24 121/66       Physical Exam  Vitals reviewed.   Constitutional:       Appearance: Normal appearance. She is well-developed.   HENT:      Head: Normocephalic and atraumatic.   Eyes:      Conjunctiva/sclera: Conjunctivae normal.      Pupils: Pupils are equal, round, and reactive to light.   Cardiovascular:      Rate and Rhythm: Normal rate and regular rhythm.      Heart sounds: Normal heart sounds. No murmur heard.  Pulmonary:      Effort: Pulmonary effort is normal.      Breath sounds: Normal breath sounds. No wheezing or rhonchi.   Abdominal:      General: Bowel sounds are normal. There is no distension.      Palpations: Abdomen is soft.      Tenderness: There is no abdominal tenderness.   Skin:     General: Skin is warm and dry.             Comments: Erythematous rash 2 in x 1 in.   Neurological:      Mental Status: She is alert and oriented to person, place, and time.   Psychiatric:       "   Mood and Affect: Mood and affect normal.         Behavior: Behavior normal.         Thought Content: Thought content normal.         Judgment: Judgment normal.         Physical Exam  Respiratory: Clear to auscultation, no wheezing, rales or rhonchi  Cardiovascular: Regular rate and rhythm, no murmurs, rubs, or gallops  Breast: Red spot on the right side of the breast consistent with a yeast infection    The following data was reviewed by ALYSSA Lowe on 06/11/2025  Common Labs   Common labs          12/18/2024    08:59   Common Labs   Glucose 97    BUN 10    Creatinine 0.82    Sodium 138    Potassium 4.0    Chloride 105    Calcium 9.0    Albumin 3.9    Total Bilirubin 0.2    Alkaline Phosphatase 108    AST (SGOT) 20    ALT (SGPT) 12    WBC 7.23    Hemoglobin 13.8    Hematocrit 41.6    Platelets 286    Total Cholesterol 152    Triglycerides 150    HDL Cholesterol 33    LDL Cholesterol  92        Results          Assessment & Plan   Diagnoses and all orders for this visit:    1. Medicare annual wellness visit, subsequent (Primary)  -     CBC (No Diff); Future  -     CBC (No Diff)    2. Hyperlipidemia, unspecified hyperlipidemia type  -     Lipid Panel; Future  -     Comprehensive Metabolic Panel; Future  -     Lipid Panel  -     Comprehensive Metabolic Panel    3. Screening for thyroid disorder  -     TSH; Future  -     TSH    4. Breast cancer screening by mammogram  -     Mammo Screening Digital Tomosynthesis Bilateral With CAD; Future    5. Chronic left-sided low back pain with left-sided sciatica  -     traMADol (ULTRAM) 50 MG tablet; Take 1 tablet by mouth Every 6 (Six) Hours As Needed for Moderate Pain.  Dispense: 30 tablet; Refill: 0    6. Neuropathy  -     gabapentin (NEURONTIN) 300 MG capsule; Take 1 capsule by mouth 3 (Three) Times a Day.  Dispense: 180 capsule; Refill: 1    7. Sciatica of left side  -     gabapentin (NEURONTIN) 300 MG capsule; Take 1 capsule by mouth 3 (Three) Times a Day.   Dispense: 180 capsule; Refill: 1    8. Seasonal allergies  -     fluticasone (FLONASE) 50 MCG/ACT nasal spray; 2 sprays by Each Nare route Daily.  Dispense: 1 g; Refill: 3  -     montelukast (SINGULAIR) 10 MG tablet; Take 1 tablet by mouth Every Night.  Dispense: 90 tablet; Refill: 1    9. Encounter for medication monitoring  -     POC Medline 12 Panel Urine Drug Screen    10. Candidiasis of breast  -     clotrimazole (Clotrimazole Anti-Fungal) 1 % cream; Apply 1 Application topically to the appropriate area as directed 2 (Two) Times a Day.  Dispense: 60 g; Refill: 0        Assessment & Plan  1. Yeast infection.  - Red and itchy spot on the right side of the breast, irritated by regular bras.  - Increased redness and itching.  - Discussion about the condition and its management.  - Topical antifungal cream prescribed, to be applied for 3 to 5 days after resolution of symptoms.    2. Back pain.  - Pain sometimes managed with gabapentin, affecting daily activities.  - Increased pain and limited mobility.  - Urine drug screen for tramadol requested.  - Tramadol refill prescribed, taken twice a week as needed.    3. Allergies.  - Effective control of allergy symptoms with Flonase and Singulair.  - Positive response to current medications.  - Discussion about allergy management.  - Refills for Flonase and Singulair provided.    4. Health Maintenance.  - Due for a mammogram. Mammogram order placed.  - Advised to schedule an eye exam.    Follow-up  The patient will follow up in 6 months.        Obtained a written consent for INDIRA query. Discussed the risks and benefits of the use of controlled substances with the patient, including the risk of tolerance and drug dependence.  The patient has been counseled on the need to have an exit strategy, including potentially discontinuing the use of controlled substances.  INDIRA has or will be reviewed as soon as it becomes available.       FOLLOW UP  Return in about 6 months  (around 12/12/2025) for Next scheduled follow up.  Patient was given instructions and counseling regarding her condition or for health maintenance advice. Please see specific information pulled into the AVS if appropriate.     Patient or patient representative verbalized consent for the use of Ambient Listening during the visit with  ALYSSA Lowe for chart documentation. 6/12/2025  07:48 EDT    ALYSSA Lowe  06/12/25  11:54 EDT

## 2025-06-12 ENCOUNTER — OFFICE VISIT (OUTPATIENT)
Dept: FAMILY MEDICINE CLINIC | Facility: CLINIC | Age: 55
End: 2025-06-12
Payer: MEDICARE

## 2025-06-12 VITALS
HEIGHT: 64 IN | BODY MASS INDEX: 42.72 KG/M2 | DIASTOLIC BLOOD PRESSURE: 84 MMHG | TEMPERATURE: 98.1 F | OXYGEN SATURATION: 97 % | SYSTOLIC BLOOD PRESSURE: 132 MMHG | WEIGHT: 250.2 LBS | HEART RATE: 64 BPM

## 2025-06-12 DIAGNOSIS — Z12.31 BREAST CANCER SCREENING BY MAMMOGRAM: ICD-10-CM

## 2025-06-12 DIAGNOSIS — Z51.81 ENCOUNTER FOR MEDICATION MONITORING: ICD-10-CM

## 2025-06-12 DIAGNOSIS — Z13.29 SCREENING FOR THYROID DISORDER: ICD-10-CM

## 2025-06-12 DIAGNOSIS — M54.32 SCIATICA OF LEFT SIDE: ICD-10-CM

## 2025-06-12 DIAGNOSIS — Z00.00 MEDICARE ANNUAL WELLNESS VISIT, SUBSEQUENT: Primary | ICD-10-CM

## 2025-06-12 DIAGNOSIS — B37.89 CANDIDIASIS OF BREAST: ICD-10-CM

## 2025-06-12 DIAGNOSIS — G62.9 NEUROPATHY: ICD-10-CM

## 2025-06-12 DIAGNOSIS — E78.5 HYPERLIPIDEMIA, UNSPECIFIED HYPERLIPIDEMIA TYPE: ICD-10-CM

## 2025-06-12 DIAGNOSIS — J30.2 SEASONAL ALLERGIES: ICD-10-CM

## 2025-06-12 DIAGNOSIS — G89.29 CHRONIC LEFT-SIDED LOW BACK PAIN WITH LEFT-SIDED SCIATICA: ICD-10-CM

## 2025-06-12 DIAGNOSIS — M54.42 CHRONIC LEFT-SIDED LOW BACK PAIN WITH LEFT-SIDED SCIATICA: ICD-10-CM

## 2025-06-12 LAB
ALBUMIN SERPL-MCNC: 3.9 G/DL (ref 3.5–5.2)
ALBUMIN/GLOB SERPL: 1.1 G/DL
ALP SERPL-CCNC: 95 U/L (ref 39–117)
ALT SERPL W P-5'-P-CCNC: 10 U/L (ref 1–33)
AMPHET+METHAMPHET UR QL: NEGATIVE
AMPHETAMINE INTERNAL CONTROL: NORMAL
AMPHETAMINES UR QL: NEGATIVE
ANION GAP SERPL CALCULATED.3IONS-SCNC: 10.1 MMOL/L (ref 5–15)
AST SERPL-CCNC: 16 U/L (ref 1–32)
BARBITURATE INTERNAL CONTROL: NORMAL
BARBITURATES UR QL SCN: NEGATIVE
BENZODIAZ UR QL SCN: NEGATIVE
BENZODIAZEPINE INTERNAL CONTROL: NORMAL
BILIRUB SERPL-MCNC: 0.3 MG/DL (ref 0–1.2)
BUN SERPL-MCNC: 14 MG/DL (ref 6–20)
BUN/CREAT SERPL: 15.7 (ref 7–25)
BUPRENORPHINE INTERNAL CONTROL: NORMAL
BUPRENORPHINE SERPL-MCNC: NEGATIVE NG/ML
CALCIUM SPEC-SCNC: 9.1 MG/DL (ref 8.6–10.5)
CANNABINOIDS SERPL QL: NEGATIVE
CHLORIDE SERPL-SCNC: 107 MMOL/L (ref 98–107)
CHOLEST SERPL-MCNC: 153 MG/DL (ref 0–200)
CO2 SERPL-SCNC: 24.9 MMOL/L (ref 22–29)
COCAINE INTERNAL CONTROL: NORMAL
COCAINE UR QL: NEGATIVE
CREAT SERPL-MCNC: 0.89 MG/DL (ref 0.57–1)
DEPRECATED RDW RBC AUTO: 37.5 FL (ref 37–54)
EGFRCR SERPLBLD CKD-EPI 2021: 76.2 ML/MIN/1.73
ERYTHROCYTE [DISTWIDTH] IN BLOOD BY AUTOMATED COUNT: 11.8 % (ref 12.3–15.4)
EXPIRATION DATE: NORMAL
GLOBULIN UR ELPH-MCNC: 3.5 GM/DL
GLUCOSE SERPL-MCNC: 87 MG/DL (ref 65–99)
HCT VFR BLD AUTO: 40.1 % (ref 34–46.6)
HDLC SERPL-MCNC: 34 MG/DL (ref 40–60)
HGB BLD-MCNC: 13.5 G/DL (ref 12–15.9)
LDLC SERPL CALC-MCNC: 93 MG/DL (ref 0–100)
LDLC/HDLC SERPL: 2.64 {RATIO}
Lab: NORMAL
MCH RBC QN AUTO: 29.7 PG (ref 26.6–33)
MCHC RBC AUTO-ENTMCNC: 33.7 G/DL (ref 31.5–35.7)
MCV RBC AUTO: 88.3 FL (ref 79–97)
MDMA (ECSTASY) INTERNAL CONTROL: NORMAL
MDMA UR QL SCN: NEGATIVE
METHADONE INTERNAL CONTROL: NORMAL
METHADONE UR QL SCN: NEGATIVE
METHAMPHETAMINE INTERNAL CONTROL: NORMAL
MORPHINE INTERNAL CONTROL: NORMAL
MORPHINE/OPIATES SCREEN, URINE: NEGATIVE
OXYCODONE INTERNAL CONTROL: NORMAL
OXYCODONE UR QL SCN: NEGATIVE
PCP UR QL SCN: NEGATIVE
PHENCYCLIDINE INTERNAL CONTROL: NORMAL
PLATELET # BLD AUTO: 253 10*3/MM3 (ref 140–450)
PMV BLD AUTO: 11.2 FL (ref 6–12)
POTASSIUM SERPL-SCNC: 4.2 MMOL/L (ref 3.5–5.2)
PROT SERPL-MCNC: 7.4 G/DL (ref 6–8.5)
RBC # BLD AUTO: 4.54 10*6/MM3 (ref 3.77–5.28)
SODIUM SERPL-SCNC: 142 MMOL/L (ref 136–145)
THC INTERNAL CONTROL: NORMAL
TRIGL SERPL-MCNC: 146 MG/DL (ref 0–150)
TSH SERPL DL<=0.05 MIU/L-ACNC: 3.89 UIU/ML (ref 0.27–4.2)
VLDLC SERPL-MCNC: 26 MG/DL (ref 5–40)
WBC NRBC COR # BLD AUTO: 6.71 10*3/MM3 (ref 3.4–10.8)

## 2025-06-12 PROCEDURE — 80061 LIPID PANEL: CPT | Performed by: NURSE PRACTITIONER

## 2025-06-12 PROCEDURE — 84443 ASSAY THYROID STIM HORMONE: CPT | Performed by: NURSE PRACTITIONER

## 2025-06-12 PROCEDURE — 85027 COMPLETE CBC AUTOMATED: CPT | Performed by: NURSE PRACTITIONER

## 2025-06-12 PROCEDURE — 80053 COMPREHEN METABOLIC PANEL: CPT | Performed by: NURSE PRACTITIONER

## 2025-06-12 RX ORDER — CLOTRIMAZOLE 1 %
1 CREAM (GRAM) TOPICAL 2 TIMES DAILY
Qty: 60 G | Refills: 0 | Status: SHIPPED | OUTPATIENT
Start: 2025-06-12

## 2025-06-12 RX ORDER — GABAPENTIN 300 MG/1
300 CAPSULE ORAL 3 TIMES DAILY
Qty: 180 CAPSULE | Refills: 1 | Status: SHIPPED | OUTPATIENT
Start: 2025-06-12

## 2025-06-12 RX ORDER — FLUTICASONE PROPIONATE 50 MCG
2 SPRAY, SUSPENSION (ML) NASAL DAILY
Qty: 1 G | Refills: 3 | Status: SHIPPED | OUTPATIENT
Start: 2025-06-12

## 2025-06-12 RX ORDER — MONTELUKAST SODIUM 10 MG/1
10 TABLET ORAL NIGHTLY
Qty: 90 TABLET | Refills: 1 | Status: SHIPPED | OUTPATIENT
Start: 2025-06-12

## 2025-06-12 RX ORDER — TRAMADOL HYDROCHLORIDE 50 MG/1
50 TABLET ORAL EVERY 6 HOURS PRN
Qty: 30 TABLET | Refills: 0 | Status: SHIPPED | OUTPATIENT
Start: 2025-06-12

## 2025-06-12 NOTE — PATIENT INSTRUCTIONS
Chronic Back Pain  Chronic back pain is back pain that lasts longer than 3 months. The cause of your back pain may not be known. Some common causes include:  Wear and tear (degenerative disease) of the bones, disks, or tissues that connect bones to each other (ligaments) in your back.  Inflammation and stiffness in your back (arthritis).  If you have chronic back pain, you may have times when the pain is more intense (flare-ups). You can also learn to manage the pain with home care.  Follow these instructions at home:  Watch for any changes in your symptoms. Take these actions to help with your pain:  Managing pain and stiffness         If told, put ice on the painful area. You may be told to apply ice for the first 24-48 hours after a flare-up starts.  Put ice in a plastic bag.  Place a towel between your skin and the bag.  Leave the ice on for 20 minutes, 2-3 times per day.  If told, apply heat to the affected area as often as told by your health care provider. Use the heat source that your provider recommends, such as a moist heat pack or a heating pad.  Place a towel between your skin and the heat source.  Leave the heat on for 20-30 minutes.  If your skin turns bright red, remove the ice or heat right away to prevent skin damage. The risk of damage is higher if you cannot feel pain, heat, or cold.  Try soaking in a warm tub.  Activity              Avoid bending and other activities that make the pain worse.  Have good posture when you stand or sit.  When you stand, keep your upper back and neck straight, with your shoulders pulled back. Avoid slouching.  When you sit, keep your back straight. Relax your shoulders. Do not round your shoulders or pull them backward.  Do not sit or  one place for too long.  Take brief periods of rest during the day. This will reduce your pain. Resting in a lying or standing position is often better than sitting to rest.  When you rest for longer periods, mix in some mild  activity or stretching between periods of rest. This will help to prevent stiffness and pain.  Get regular exercise. Ask your provider what activities are safe for you.  You may have to avoid lifting. Ask your provider how much you can safely lift. If you do lift, always use the right technique. This means you should:  Bend your knees.  Keep the load close to your body.  Avoid twisting.  Medicines  Take over-the-counter and prescription medicines only as told by your provider.  You may need to take medicines for pain and inflammation. These may be taken by mouth or put on the skin. You may also be given muscle relaxants.  Ask your provider if the medicine prescribed to you:  Requires you to avoid driving or using machinery.  Can cause constipation. You may need to take these actions to prevent or treat constipation:  Drink enough fluid to keep your pee (urine) pale yellow.  Take over-the-counter or prescription medicines.  Eat foods that are high in fiber, such as beans, whole grains, and fresh fruits and vegetables.  Limit foods that are high in fat and processed sugars, such as fried or sweet foods.  General instructions    Sleep on a firm mattress in a comfortable position. Try lying on your side with your knees slightly bent. If you lie on your back, put a pillow under your knees.  Do not use any products that contain nicotine or tobacco. These products include cigarettes, chewing tobacco, and vaping devices, such as e-cigarettes. If you need help quitting, ask your provider.  Contact a health care provider if:  You have pain that does not get better with rest or medicine.  You have new pain.  You have a fever.  You lose weight quickly.  You have trouble doing your normal activities.  You feel weak or numb in one or both of your legs or feet.  Get help right away if:  You are not able to control when you pee or poop.  You have severe back pain and:  Nausea or vomiting.  Pain in your chest or abdomen.  Shortness  of breath.  You faint.  These symptoms may be an emergency. Get help right away. Call 911.  Do not wait to see if the symptoms will go away.  Do not drive yourself to the hospital.  This information is not intended to replace advice given to you by your health care provider. Make sure you discuss any questions you have with your health care provider.  Document Revised: 08/07/2023 Document Reviewed: 08/07/2023  [email or web address] Patient Education © 2024 [email or web address]Preventive Care 40-64 Years Old, Female  Preventive care refers to lifestyle choices and visits with your health care provider that can promote health and wellness. Preventive care visits are also called wellness exams.  What can I expect for my preventive care visit?  Counseling  Your health care provider may ask you questions about your:  Medical history, including:  Past medical problems.  Family medical history.  Pregnancy history.  Current health, including:  Menstrual cycle.  Method of birth control.  Emotional well-being.  Home life and relationship well-being.  Sexual activity and sexual health.  Lifestyle, including:  Alcohol, nicotine or tobacco, and drug use.  Access to firearms.  Diet, exercise, and sleep habits.  Work and work environment.  Sunscreen use.  Safety issues such as seatbelt and bike helmet use.  Physical exam  Your health care provider will check your:  Height and weight. These may be used to calculate your BMI (body mass index). BMI is a measurement that tells if you are at a healthy weight.  Waist circumference. This measures the distance around your waistline. This measurement also tells if you are at a healthy weight and may help predict your risk of certain diseases, such as type 2 diabetes and high blood pressure.  Heart rate and blood pressure.  Body temperature.  Skin for abnormal spots.  What immunizations do I need?    Vaccines are usually given at various ages, according to a schedule. Your health care provider will recommend  vaccines for you based on your age, medical history, and lifestyle or other factors, such as travel or where you work.  What tests do I need?  Screening  Your health care provider may recommend screening tests for certain conditions. This may include:  Lipid and cholesterol levels.  Diabetes screening. This is done by checking your blood sugar (glucose) after you have not eaten for a while (fasting).  Pelvic exam and Pap test.  Hepatitis B test.  Hepatitis C test.  HIV (human immunodeficiency virus) test.  STI (sexually transmitted infection) testing, if you are at risk.  Lung cancer screening.  Colorectal cancer screening.  Mammogram. Talk with your health care provider about when you should start having regular mammograms. This may depend on whether you have a family history of breast cancer.  BRCA-related cancer screening. This may be done if you have a family history of breast, ovarian, tubal, or peritoneal cancers.  Bone density scan. This is done to screen for osteoporosis.  Talk with your health care provider about your test results, treatment options, and if necessary, the need for more tests.  Follow these instructions at home:  Eating and drinking    Eat a diet that includes fresh fruits and vegetables, whole grains, lean protein, and low-fat dairy products.  Take vitamin and mineral supplements as recommended by your health care provider.  Do not drink alcohol if:  Your health care provider tells you not to drink.  You are pregnant, may be pregnant, or are planning to become pregnant.  If you drink alcohol:  Limit how much you have to 0-1 drink a day.  Know how much alcohol is in your drink. In the [place] one drink equals one 12 oz bottle of beer (355 mL), one 5 oz glass of wine (148 mL), or one 1½ oz glass of hard liquor (44 mL).  Lifestyle  Brush your teeth every morning and night with fluoride toothpaste. Floss one time each day.  Exercise for at least 30 minutes 5 or more days each week.  Do not use  any products that contain nicotine or tobacco. These products include cigarettes, chewing tobacco, and vaping devices, such as e-cigarettes. If you need help quitting, ask your health care provider.  Do not use drugs.  If you are sexually active, practice safe sex. Use a condom or other form of protection to prevent STIs.  If you do not wish to become pregnant, use a form of birth control. If you plan to become pregnant, see your health care provider for a prepregnancy visit.  Take aspirin only as told by your health care provider. Make sure that you understand how much to take and what form to take. Work with your health care provider to find out whether it is safe and beneficial for you to take aspirin daily.  Find healthy ways to manage stress, such as:  Meditation, yoga, or listening to music.  Journaling.  Talking to a trusted person.  Spending time with friends and family.  Minimize exposure to UV radiation to reduce your risk of skin cancer.  Safety  Always wear your seat belt while driving or riding in a vehicle.  Do not drive:  If you have been drinking alcohol. Do not ride with someone who has been drinking.  When you are tired or distracted.  While texting.  If you have been using any mind-altering substances or drugs.  Wear a helmet and other protective equipment during sports activities.  If you have firearms in your house, make sure you follow all gun safety procedures.  Seek help if you have been physically or sexually abused.  What's next?  Visit your health care provider once a year for an annual wellness visit.  Ask your health care provider how often you should have your eyes and teeth checked.  Stay up to date on all vaccines.  This information is not intended to replace advice given to you by your health care provider. Make sure you discuss any questions you have with your health care provider.  Document Revised: 06/15/2022 Document Reviewed: 06/15/2022  [employer] Patient Education © 2024  [employer]